# Patient Record
Sex: FEMALE | Race: WHITE | NOT HISPANIC OR LATINO | Employment: FULL TIME
[De-identification: names, ages, dates, MRNs, and addresses within clinical notes are randomized per-mention and may not be internally consistent; named-entity substitution may affect disease eponyms.]

---

## 2017-06-17 ENCOUNTER — HEALTH MAINTENANCE LETTER (OUTPATIENT)
Age: 52
End: 2017-06-17

## 2017-09-13 ENCOUNTER — OFFICE VISIT (OUTPATIENT)
Dept: MIDWIFE SERVICES | Facility: CLINIC | Age: 52
End: 2017-09-13
Payer: COMMERCIAL

## 2017-09-13 VITALS
BODY MASS INDEX: 26.37 KG/M2 | SYSTOLIC BLOOD PRESSURE: 110 MMHG | DIASTOLIC BLOOD PRESSURE: 70 MMHG | HEIGHT: 65 IN | WEIGHT: 158.3 LBS | TEMPERATURE: 99.2 F | HEART RATE: 83 BPM

## 2017-09-13 DIAGNOSIS — E28.319 EARLY MENOPAUSE: ICD-10-CM

## 2017-09-13 DIAGNOSIS — R10.2 PELVIC PAIN IN FEMALE: Primary | ICD-10-CM

## 2017-09-13 PROCEDURE — 99213 OFFICE O/P EST LOW 20 MIN: CPT | Performed by: ADVANCED PRACTICE MIDWIFE

## 2017-09-13 NOTE — NURSING NOTE
"Chief Complaint   Patient presents with     Consult       Initial /70  Pulse 83  Temp 99.2  F (37.3  C) (Oral)  Ht 5' 5\" (1.651 m)  Wt 158 lb 4.8 oz (71.8 kg)  LMP 2012  BMI 26.34 kg/m2 Estimated body mass index is 26.34 kg/(m^2) as calculated from the following:    Height as of this encounter: 5' 5\" (1.651 m).    Weight as of this encounter: 158 lb 4.8 oz (71.8 kg).  BP completed using cuff size: regular        The following HM Due: NONE      The following patient reported/Care Every where data was sent to:  P ABSTRACT QUALITY INITIATIVES [[id number]]  Pap smear done on this date: 2017 (approximately), by this group: [facility] in [place] , results were negative .      N/a      Devorah Acevedo MA               "

## 2017-09-13 NOTE — MR AVS SNAPSHOT
After Visit Summary   9/13/2017    Peace Boudreaux    MRN: [id number]           Patient Information     Date Of Birth          1965        Visit Information        Provider Department      9/13/2017 11:30 AM Ciarra Paz CNM [facility]        Today's Diagnoses     Pelvic pain in female    -  1       Follow-ups after your visit        Follow-up notes from your care team     Return for Pelvic US and Gyn appt following .      Future tests that were ordered for you today     Open Future Orders        Priority Expected Expires Ordered    US Pelvic Limited Routine  9/13/2018 9/13/2017            Who to contact     If you have questions or need follow up information about today's clinic visit or your schedule please contact [facility] directly at [phone].  Normal or non-critical lab and imaging results will be communicated to you by [employer], letter or phone within 4 business days after the clinic has received the results. If you do not hear from us within 7 days, please contact the clinic through [employer] or phone. If you have a critical or abnormal lab result, we will notify you by phone as soon as possible.  Submit refill requests through [employer] or call your pharmacy and they will forward the refill request to us. Please allow 3 business days for your refill to be completed.          Additional Information About Your Visit        MyChart Information     [employer] gives you secure access to your electronic health record. If you see a primary care provider, you can also send messages to your care team and make appointments. If you have questions, please call your primary care clinic.  If you do not have a primary care provider, please call [phone] and they will assist you.        Care EveryWhere ID     This is your Care EveryWhere ID. This could be used by other organizations to access your [place] medical records  [id number]        Your Vitals Were  "    Pulse Temperature Height Last Period BMI (Body Mass Index)       83 99.2  F (37.3  C) (Oral) 5' 5\" (1.651 m) 05/08/2012 26.34 kg/m2        Blood Pressure from Last 3 Encounters:   09/13/17 110/70   09/03/15 119/75   07/20/15 111/76    Weight from Last 3 Encounters:   09/13/17 158 lb 4.8 oz (71.8 kg)   09/03/15 152 lb (68.9 kg)   07/20/15 152 lb (68.9 kg)               Primary Care Provider    Clinic Unassigned Viet Iraheta MD       No address on file        Equal Access to Services     [facility]: Hadii jose luis Sharpe, wamarcin howell, qaybrosa kaalmaana pang, dylan conway . So [facility] [phone].    ATENCIÓN: Si habla español, tiene a atkinson disposición servicios gratuitos de asistencia lingüística. Llame al [phone].    We comply with applicable federal civil rights laws and [place] laws. We do not discriminate on the basis of race, color, national origin, age, disability sex, sexual orientation or gender identity.            Thank you!     Thank you for choosing [facility]  for your care. Our goal is always to provide you with excellent care. Hearing back from our patients is one way we can continue to improve our services. Please take a few minutes to complete the written survey that you may receive in the mail after your visit with us. Thank you!             Your Updated Medication List - Protect others around you: Learn how to safely use, store and throw away your medicines at www.disposemymeds.org.          This list is accurate as of: 9/13/17 12:08 PM.  Always use your most recent med list.                   Brand Name Dispense Instructions for use Diagnosis    calcium carbonate 1250 MG tablet    OS-BARRIE 500 mg [place]. [place]     Take 500 mg by mouth 2 times daily        WOMENS MULTIVITAMIN PLUS PO      Take  by mouth.          "

## 2017-09-13 NOTE — PROGRESS NOTES
"S:  Here for \"severe abdominal pain\". Reports that it is 5/10 and sometimes has waves of pain 8/10 that makes her double over.  Has also been feeling \"full\", \"bloated\" Stopped taking estrogen replacement about 1 month ago.  About 2 weeks ago started having diarrhea.  Also has had gas.  Feels constipated, but has had regular BMs and diarrhea.    O:  Abdominal exam done.  Reported tenderness when palpating lower left quadrant.    A:  Abdominal pain - suspected intestinal pain  R/O ovarian pain   P:  Advised her to go to ED if pain continues or worsens  Pelvic US and OB/Gyn consult ordered - walked her to [facility]'s office to schedule.    Advised her to see Primary Care to discuss GI concerns  Peace verbalized understanding and agreement with plan     20 minutes was spent face to face with the patient today discussing her history, diagnosis, and follow-up plan as noted above. Over 50% of the visit was spent in counseling and coordination of care.    Total Visit Time: 20 minutes.         "

## 2017-09-14 ENCOUNTER — RADIANT APPOINTMENT (OUTPATIENT)
Dept: ULTRASOUND IMAGING | Facility: CLINIC | Age: 52
End: 2017-09-14
Attending: ADVANCED PRACTICE MIDWIFE
Payer: COMMERCIAL

## 2017-09-14 DIAGNOSIS — R10.2 PELVIC PAIN IN FEMALE: ICD-10-CM

## 2017-09-14 PROCEDURE — 76830 TRANSVAGINAL US NON-OB: CPT | Performed by: OBSTETRICS & GYNECOLOGY

## 2017-09-15 ENCOUNTER — HOSPITAL ENCOUNTER (OUTPATIENT)
Dept: CT IMAGING | Facility: CLINIC | Age: 52
Discharge: HOME | End: 2017-09-15
Attending: FAMILY MEDICINE | Admitting: FAMILY MEDICINE
Payer: COMMERCIAL

## 2017-09-15 DIAGNOSIS — R10.9 UNSPECIFIED ABDOMINAL PAIN: ICD-10-CM

## 2017-09-15 PROCEDURE — 25000125 ZZHC RX 250: Performed by: RADIOLOGY

## 2017-09-15 PROCEDURE — 25000128 H RX IP 250 OP 636: Performed by: RADIOLOGY

## 2017-09-15 PROCEDURE — 74177 CT ABD & PELVIS W/CONTRAST: CPT

## 2017-09-15 RX ORDER — IOPAMIDOL 755 MG/ML
100 INJECTION, SOLUTION INTRAVASCULAR ONCE
Status: COMPLETED | OUTPATIENT
Start: 2017-09-15 | End: 2017-09-15

## 2017-09-15 RX ADMIN — SODIUM CHLORIDE 59 ML: 9 INJECTION, SOLUTION INTRAVENOUS at 13:34

## 2017-09-15 RX ADMIN — IOPAMIDOL 78 ML: 755 INJECTION, SOLUTION INTRAVENOUS at 13:34

## 2017-09-21 ENCOUNTER — RADIANT APPOINTMENT (OUTPATIENT)
Dept: MAMMOGRAPHY | Facility: CLINIC | Age: 52
End: 2017-09-21
Payer: COMMERCIAL

## 2017-09-21 DIAGNOSIS — Z12.31 VISIT FOR SCREENING MAMMOGRAM: ICD-10-CM

## 2017-09-21 PROCEDURE — G0202 SCR MAMMO BI INCL CAD: HCPCS

## 2018-03-12 ENCOUNTER — TELEPHONE (OUTPATIENT)
Dept: MIDWIFE SERVICES | Facility: CLINIC | Age: 53
End: 2018-03-12

## 2018-03-12 NOTE — TELEPHONE ENCOUNTER
Pt is past due for f/u pap smear.  Reminder letter was sent 05/19/17.  Spoke with pt, states she is being followed elsewhere for her Gyn care.  Shahana Shin,    Pap Tracking

## 2018-06-06 ENCOUNTER — TRANSFERRED RECORDS (OUTPATIENT)
Dept: HEALTH INFORMATION MANAGEMENT | Facility: CLINIC | Age: 53
End: 2018-06-06

## 2018-06-08 ENCOUNTER — TRANSFERRED RECORDS (OUTPATIENT)
Dept: HEALTH INFORMATION MANAGEMENT | Facility: CLINIC | Age: 53
End: 2018-06-08

## 2018-06-19 ENCOUNTER — TRANSFERRED RECORDS (OUTPATIENT)
Dept: HEALTH INFORMATION MANAGEMENT | Facility: CLINIC | Age: 53
End: 2018-06-19

## 2018-06-23 ENCOUNTER — HEALTH MAINTENANCE LETTER (OUTPATIENT)
Age: 53
End: 2018-06-23

## 2018-07-05 ENCOUNTER — HOSPITAL ENCOUNTER (OUTPATIENT)
Facility: CLINIC | Age: 53
Discharge: HOME | End: 2018-07-05
Attending: OBSTETRICS & GYNECOLOGY | Admitting: OBSTETRICS & GYNECOLOGY
Payer: COMMERCIAL

## 2018-07-05 ENCOUNTER — ANESTHESIA EVENT (OUTPATIENT)
Dept: SURGERY | Facility: CLINIC | Age: 53
End: 2018-07-05
Payer: COMMERCIAL

## 2018-07-05 ENCOUNTER — ANESTHESIA (OUTPATIENT)
Dept: SURGERY | Facility: CLINIC | Age: 53
End: 2018-07-05
Payer: COMMERCIAL

## 2018-07-05 VITALS
TEMPERATURE: 95.9 F | WEIGHT: 170 LBS | BODY MASS INDEX: 27.32 KG/M2 | SYSTOLIC BLOOD PRESSURE: 134 MMHG | HEIGHT: 66 IN | RESPIRATION RATE: 16 BRPM | OXYGEN SATURATION: 96 % | DIASTOLIC BLOOD PRESSURE: 89 MMHG

## 2018-07-05 PROCEDURE — 25000566 ZZH SEVOFLURANE, EA 15 MIN: Performed by: OBSTETRICS & GYNECOLOGY

## 2018-07-05 PROCEDURE — 37000008 ZZH ANESTHESIA TECHNICAL FEE, 1ST 30 MIN: Performed by: OBSTETRICS & GYNECOLOGY

## 2018-07-05 PROCEDURE — 37000009 ZZH ANESTHESIA TECHNICAL FEE, EACH ADDTL 15 MIN: Performed by: OBSTETRICS & GYNECOLOGY

## 2018-07-05 PROCEDURE — 25800025 ZZH RX 258: Performed by: OBSTETRICS & GYNECOLOGY

## 2018-07-05 PROCEDURE — 71000012 ZZH RECOVERY PHASE 1 LEVEL 1 FIRST HR: Performed by: OBSTETRICS & GYNECOLOGY

## 2018-07-05 PROCEDURE — 71000027 ZZH RECOVERY PHASE 2 EACH 15 MINS: Performed by: OBSTETRICS & GYNECOLOGY

## 2018-07-05 PROCEDURE — 25000128 H RX IP 250 OP 636: Performed by: NURSE ANESTHETIST, CERTIFIED REGISTERED

## 2018-07-05 PROCEDURE — 27210995 ZZH RX 272: Performed by: OBSTETRICS & GYNECOLOGY

## 2018-07-05 PROCEDURE — 40000170 ZZH STATISTIC PRE-PROCEDURE ASSESSMENT II: Performed by: OBSTETRICS & GYNECOLOGY

## 2018-07-05 PROCEDURE — 36000058 ZZH SURGERY LEVEL 3 EA 15 ADDTL MIN: Performed by: OBSTETRICS & GYNECOLOGY

## 2018-07-05 PROCEDURE — 25000132 ZZH RX MED GY IP 250 OP 250 PS 637: Performed by: OBSTETRICS & GYNECOLOGY

## 2018-07-05 PROCEDURE — 88305 TISSUE EXAM BY PATHOLOGIST: CPT | Mod: 26 | Performed by: OBSTETRICS & GYNECOLOGY

## 2018-07-05 PROCEDURE — 25000125 ZZHC RX 250: Performed by: NURSE ANESTHETIST, CERTIFIED REGISTERED

## 2018-07-05 PROCEDURE — 88305 TISSUE EXAM BY PATHOLOGIST: CPT | Performed by: OBSTETRICS & GYNECOLOGY

## 2018-07-05 PROCEDURE — 36000056 ZZH SURGERY LEVEL 3 1ST 30 MIN: Performed by: OBSTETRICS & GYNECOLOGY

## 2018-07-05 PROCEDURE — 27210794 ZZH OR GENERAL SUPPLY STERILE: Performed by: OBSTETRICS & GYNECOLOGY

## 2018-07-05 RX ORDER — ONDANSETRON 2 MG/ML
4 INJECTION INTRAMUSCULAR; INTRAVENOUS EVERY 30 MIN PRN
Status: DISCONTINUED | OUTPATIENT
Start: 2018-07-05 | End: 2018-07-05 | Stop reason: HOSPADM

## 2018-07-05 RX ORDER — DEXAMETHASONE SODIUM PHOSPHATE 4 MG/ML
INJECTION, SOLUTION INTRA-ARTICULAR; INTRALESIONAL; INTRAMUSCULAR; INTRAVENOUS; SOFT TISSUE PRN
Status: DISCONTINUED | OUTPATIENT
Start: 2018-07-05 | End: 2018-07-05

## 2018-07-05 RX ORDER — GLYCOPYRROLATE 0.2 MG/ML
INJECTION, SOLUTION INTRAMUSCULAR; INTRAVENOUS PRN
Status: DISCONTINUED | OUTPATIENT
Start: 2018-07-05 | End: 2018-07-05

## 2018-07-05 RX ORDER — SODIUM CHLORIDE, SODIUM LACTATE, POTASSIUM CHLORIDE, CALCIUM CHLORIDE 600; 310; 30; 20 MG/100ML; MG/100ML; MG/100ML; MG/100ML
INJECTION, SOLUTION INTRAVENOUS CONTINUOUS
Status: DISCONTINUED | OUTPATIENT
Start: 2018-07-05 | End: 2018-07-05 | Stop reason: HOSPADM

## 2018-07-05 RX ORDER — LIDOCAINE HYDROCHLORIDE 20 MG/ML
INJECTION, SOLUTION INFILTRATION; PERINEURAL PRN
Status: DISCONTINUED | OUTPATIENT
Start: 2018-07-05 | End: 2018-07-05

## 2018-07-05 RX ORDER — FENTANYL CITRATE 50 UG/ML
25-50 INJECTION, SOLUTION INTRAMUSCULAR; INTRAVENOUS
Status: DISCONTINUED | OUTPATIENT
Start: 2018-07-05 | End: 2018-07-05 | Stop reason: HOSPADM

## 2018-07-05 RX ORDER — ONDANSETRON 4 MG/1
4 TABLET, ORALLY DISINTEGRATING ORAL EVERY 30 MIN PRN
Status: DISCONTINUED | OUTPATIENT
Start: 2018-07-05 | End: 2018-07-05 | Stop reason: HOSPADM

## 2018-07-05 RX ORDER — NEOSTIGMINE METHYLSULFATE 1 MG/ML
VIAL (ML) INJECTION PRN
Status: DISCONTINUED | OUTPATIENT
Start: 2018-07-05 | End: 2018-07-05

## 2018-07-05 RX ORDER — PROPOFOL 10 MG/ML
INJECTION, EMULSION INTRAVENOUS CONTINUOUS PRN
Status: DISCONTINUED | OUTPATIENT
Start: 2018-07-05 | End: 2018-07-05

## 2018-07-05 RX ORDER — ONDANSETRON 2 MG/ML
INJECTION INTRAMUSCULAR; INTRAVENOUS PRN
Status: DISCONTINUED | OUTPATIENT
Start: 2018-07-05 | End: 2018-07-05

## 2018-07-05 RX ORDER — SODIUM CHLORIDE, SODIUM LACTATE, POTASSIUM CHLORIDE, CALCIUM CHLORIDE 600; 310; 30; 20 MG/100ML; MG/100ML; MG/100ML; MG/100ML
INJECTION, SOLUTION INTRAVENOUS CONTINUOUS PRN
Status: DISCONTINUED | OUTPATIENT
Start: 2018-07-05 | End: 2018-07-05

## 2018-07-05 RX ORDER — FENTANYL CITRATE 50 UG/ML
INJECTION, SOLUTION INTRAMUSCULAR; INTRAVENOUS PRN
Status: DISCONTINUED | OUTPATIENT
Start: 2018-07-05 | End: 2018-07-05

## 2018-07-05 RX ORDER — HYDROMORPHONE HYDROCHLORIDE 1 MG/ML
.3-.5 INJECTION, SOLUTION INTRAMUSCULAR; INTRAVENOUS; SUBCUTANEOUS EVERY 10 MIN PRN
Status: DISCONTINUED | OUTPATIENT
Start: 2018-07-05 | End: 2018-07-05 | Stop reason: HOSPADM

## 2018-07-05 RX ORDER — ALBUTEROL SULFATE 0.83 MG/ML
2.5 SOLUTION RESPIRATORY (INHALATION) EVERY 4 HOURS PRN
Status: DISCONTINUED | OUTPATIENT
Start: 2018-07-05 | End: 2018-07-05 | Stop reason: HOSPADM

## 2018-07-05 RX ORDER — MAGNESIUM HYDROXIDE 1200 MG/15ML
LIQUID ORAL PRN
Status: DISCONTINUED | OUTPATIENT
Start: 2018-07-05 | End: 2018-07-05 | Stop reason: HOSPADM

## 2018-07-05 RX ORDER — PROPOFOL 10 MG/ML
INJECTION, EMULSION INTRAVENOUS PRN
Status: DISCONTINUED | OUTPATIENT
Start: 2018-07-05 | End: 2018-07-05

## 2018-07-05 RX ORDER — KETOROLAC TROMETHAMINE 30 MG/ML
INJECTION, SOLUTION INTRAMUSCULAR; INTRAVENOUS PRN
Status: DISCONTINUED | OUTPATIENT
Start: 2018-07-05 | End: 2018-07-05

## 2018-07-05 RX ORDER — OXYCODONE HYDROCHLORIDE 5 MG/1
5 TABLET ORAL
Status: COMPLETED | OUTPATIENT
Start: 2018-07-05 | End: 2018-07-05

## 2018-07-05 RX ORDER — HYDRALAZINE HYDROCHLORIDE 20 MG/ML
2.5-5 INJECTION INTRAMUSCULAR; INTRAVENOUS EVERY 10 MIN PRN
Status: DISCONTINUED | OUTPATIENT
Start: 2018-07-05 | End: 2018-07-05 | Stop reason: HOSPADM

## 2018-07-05 RX ORDER — MEPERIDINE HYDROCHLORIDE 25 MG/ML
12.5 INJECTION INTRAMUSCULAR; INTRAVENOUS; SUBCUTANEOUS
Status: DISCONTINUED | OUTPATIENT
Start: 2018-07-05 | End: 2018-07-05 | Stop reason: HOSPADM

## 2018-07-05 RX ORDER — NALOXONE HYDROCHLORIDE 0.4 MG/ML
.1-.4 INJECTION, SOLUTION INTRAMUSCULAR; INTRAVENOUS; SUBCUTANEOUS
Status: DISCONTINUED | OUTPATIENT
Start: 2018-07-05 | End: 2018-07-05 | Stop reason: HOSPADM

## 2018-07-05 RX ADMIN — PROPOFOL 50 MG: 10 INJECTION, EMULSION INTRAVENOUS at 10:30

## 2018-07-05 RX ADMIN — DEXAMETHASONE SODIUM PHOSPHATE 4 MG: 4 INJECTION, SOLUTION INTRA-ARTICULAR; INTRALESIONAL; INTRAMUSCULAR; INTRAVENOUS; SOFT TISSUE at 10:35

## 2018-07-05 RX ADMIN — LIDOCAINE HYDROCHLORIDE 100 MG: 20 INJECTION, SOLUTION INFILTRATION; PERINEURAL at 10:28

## 2018-07-05 RX ADMIN — SODIUM CHLORIDE, POTASSIUM CHLORIDE, SODIUM LACTATE AND CALCIUM CHLORIDE: 600; 310; 30; 20 INJECTION, SOLUTION INTRAVENOUS at 10:24

## 2018-07-05 RX ADMIN — GLYCOPYRROLATE 0.2 MG: 0.2 INJECTION, SOLUTION INTRAMUSCULAR; INTRAVENOUS at 10:53

## 2018-07-05 RX ADMIN — ONDANSETRON 4 MG: 2 INJECTION INTRAMUSCULAR; INTRAVENOUS at 10:50

## 2018-07-05 RX ADMIN — PROPOFOL 150 MG: 10 INJECTION, EMULSION INTRAVENOUS at 10:28

## 2018-07-05 RX ADMIN — NEOSTIGMINE METHYLSULFATE 2 MG: 1 INJECTION, SOLUTION INTRAVENOUS at 10:53

## 2018-07-05 RX ADMIN — KETOROLAC TROMETHAMINE 30 MG: 30 INJECTION, SOLUTION INTRAMUSCULAR at 10:50

## 2018-07-05 RX ADMIN — FENTANYL CITRATE 50 MCG: 50 INJECTION, SOLUTION INTRAMUSCULAR; INTRAVENOUS at 10:28

## 2018-07-05 RX ADMIN — ROCURONIUM BROMIDE 10 MG: 10 INJECTION INTRAVENOUS at 10:39

## 2018-07-05 RX ADMIN — PROPOFOL 100 MCG/KG/MIN: 10 INJECTION, EMULSION INTRAVENOUS at 10:31

## 2018-07-05 RX ADMIN — MIDAZOLAM 2 MG: 1 INJECTION INTRAMUSCULAR; INTRAVENOUS at 10:24

## 2018-07-05 RX ADMIN — OXYCODONE HYDROCHLORIDE 5 MG: 5 TABLET ORAL at 11:51

## 2018-07-05 ASSESSMENT — ENCOUNTER SYMPTOMS
DYSRHYTHMIAS: 0
SEIZURES: 0

## 2018-07-05 ASSESSMENT — COPD QUESTIONNAIRES: COPD: 0

## 2018-07-05 ASSESSMENT — LIFESTYLE VARIABLES: TOBACCO_USE: 0

## 2018-07-05 NOTE — ANESTHESIA POSTPROCEDURE EVALUATION
Patient: Peace Boudreaux    Procedure(s):  OPERATIVE HYSTEROSCOPY AND POLYPECTOMY WITH SUERO NEPHEW MORCELLATOR AND FLUID MANAGEMENT  - Wound Class: II-Clean Contaminated    Diagnosis:post menopausal bleeding   Diagnosis Additional Information: No value filed.    Anesthesia Type:  LMA, General    Note:  Anesthesia Post Evaluation    Patient location during evaluation: PACU  Patient participation: Able to fully participate in evaluation  Level of consciousness: awake and alert  Pain management: adequate  Airway patency: patent  Cardiovascular status: acceptable, blood pressure returned to baseline and hemodynamically stable  Respiratory status: acceptable and nasal cannula  Hydration status: acceptable  PONV: none     Anesthetic complications: None          Last vitals:  Vitals:    07/05/18 1105 07/05/18 1115 07/05/18 1130   BP:  121/78 123/80   Resp: 16 17 14   Temp:  35.4  C (95.7  F) 35.4  C (95.7  F)   SpO2:  99% 96%         Electronically Signed By: Nicole Soto MD  July 5, 2018  11:44 AM

## 2018-07-05 NOTE — ANESTHESIA CARE TRANSFER NOTE
Patient: Peace Boudreaux    Procedure(s):  OPERATIVE HYSTEROSCOPY AND POLYPECTOMY WITH SUERO NEPHEW MORCELLATOR AND FLUID MANAGEMENT  - Wound Class: II-Clean Contaminated    Diagnosis: post menopausal bleeding   Diagnosis Additional Information: No value filed.    Anesthesia Type:   LMA, General     Note:  Airway :Face Mask  Patient transferred to:PACU  Comments: VSS on arrival to PACU. Alert and talking, on 6L SFM 02. Report given to RN before transfer of patient care.Handoff Report: Identifed the Patient, Identified the Reponsible Provider, Reviewed the pertinent medical history, Discussed the surgical course, Reviewed Intra-OP anesthesia mangement and issues during anesthesia, Set expectations for post-procedure period and Allowed opportunity for questions and acknowledgement of understanding      Vitals: (Last set prior to Anesthesia Care Transfer)    CRNA VITALS  7/5/2018 1032 - 7/5/2018 1109      7/5/2018             Pulse: 67    SpO2: 100 %    Resp Rate (observed): (!)  5                Electronically Signed By: MINNIE Byrnes CRNA  July 5, 2018  11:09 AM

## 2018-07-05 NOTE — ANESTHESIA PREPROCEDURE EVALUATION
Procedure: Procedure(s):  COMBINED DILATION AND CURETTAGE, OPERATIVE HYSTEROSCOPY WITH MORCELLATOR  Preop diagnosis: post menopausal bleeding     Allergies   Allergen Reactions     No Known Drug Allergies      Past Medical History:   Diagnosis Date     ASCUS favor benign 8/2015    colp - neg     Congenital anomalies of other endocrine glands 2004    Cyst on thyroid     LSIL (low grade squamous intraepithelial lesion) on Pap smear 6/2014    neg high risk  HPV     Past Surgical History:   Procedure Laterality Date     HC EXCISE HAND/FOOT NEUROMA  2002     THYROID BIOPSY  2004     Prior to Admission medications    Medication Sig Start Date End Date Taking? Authorizing Provider   calcium carbonate (OS-BARRIE 500 MG [place]. [place]) 500 MG tablet Take 500 mg by mouth 2 times daily    Reported, Patient   Multiple Vitamins-Minerals (WOMENS MULTIVITAMIN PLUS PO) Take  by mouth.    Reported, Patient     No current Epic-ordered facility-administered medications on file.      Current Outpatient Prescriptions Ordered in Epic   Medication     calcium carbonate (OS-BARRIE 500 MG [place]. [place]) 500 MG tablet     Multiple Vitamins-Minerals (WOMENS MULTIVITAMIN PLUS PO)     Wt Readings from Last 1 Encounters:   09/13/17 71.8 kg (158 lb 4.8 oz)     Temp Readings from Last 1 Encounters:   09/13/17 37.3  C (99.2  F) (Oral)     BP Readings from Last 6 Encounters:   09/13/17 110/70   09/03/15 119/75   07/20/15 111/76   06/10/14 104/73   09/17/12 104/73   05/10/12 110/62     Pulse Readings from Last 4 Encounters:   09/13/17 83   09/03/15 84   07/20/15 85   06/10/14 65     Resp Readings from Last 1 Encounters:   No data found for Resp     SpO2 Readings from Last 1 Encounters:   07/20/15 100%       Anesthesia Evaluation     .             ROS/MED HX    ENT/Pulmonary:      (-) tobacco use, asthma, COPD and sleep apnea   Neurologic:      (-) seizures, CVA and migraines   Cardiovascular:        (-) hypertension, CAD, WALKER, arrhythmias, valvular problems/murmurs  and dyslipidemia   METS/Exercise Tolerance:  >4 METS   Hematologic:        (-) history of blood clots, anemia and other hematologic disorder   Musculoskeletal:        (-) arthritis   GI/Hepatic:        (-) GERD and liver disease   Renal/Genitourinary:     (+) Other Renal/ Genitourinary (uterine polyps, post-menopausal bleeding),    (-) renal disease and nephrolithiasis   Endo:      (-) Type I DM, Type II DM, thyroid disease and obesity   Psychiatric:  - neg psychiatric ROS       Infectious Disease:        (-) Recent Fever   Malignancy:         Other:                     Physical Exam  Normal systems: cardiovascular, pulmonary and dental    Airway   Mallampati: III  TM distance: >3 FB  Neck ROM: full    Dental     Cardiovascular   Rhythm and rate: regular and normal  (-) no murmur    Pulmonary    breath sounds clear to auscultation                    Anesthesia Plan      History & Physical Review      ASA Status:  1 .    NPO Status:  > 8 hours    Plan for LMA and General with Propofol induction.   PONV prophylaxis:  Ondansetron (or other 5HT-3) and Dexamethasone or Solumedrol  Propofol infusion      Postoperative Care  Postoperative pain management:  Multi-modal analgesia.      Consents  Anesthetic plan, risks, benefits and alternatives discussed with:  Patient..                          .

## 2018-07-05 NOTE — IP AVS SNAPSHOT
[facility] Same Day [facility]    [address]    [place] [address]    Phone:  [phone]    Fax:  [phone]                                       After Visit Summary   7/5/2018    Peace Boudreaux    MRN: [id number]           After Visit Summary Signature Page     I have received my discharge instructions, and my questions have been answered. I have discussed any challenges I see with this plan with the nurse or doctor.    ..........................................................................................................................................  Patient/Patient Representative Signature      ..........................................................................................................................................  Patient Representative Print Name and Relationship to Patient    ..................................................               ................................................  Date                                            Time    ..........................................................................................................................................  Reviewed by Signature/Title    ...................................................              ..............................................  Date                                                            Time

## 2018-07-05 NOTE — OP NOTE
Procedure Date: 2018      PREOPERATIVE DIAGNOSIS:  Postmenopausal bleeding, possible endometrial polyp.      POSTOPERATIVE DIAGNOSIS:  Postmenopausal bleeding, possible endometrial polyp.      PROCEDURE PERFORMED:  Hysteroscopy and excision of uterine masses with Smith & Nephew morcellator.      PROCEDURE IN DETAIL:  The patient was prepped and draped in the lithotomy position under general anesthetic.  Examination at this time revealed the uterus to be normal size and anteverted.  A single-tooth tenaculum was applied to the anterior lip of the cervix in a transverse manner and a cervical cannula inserted into the cervix.  A single-tooth tenaculum was applied to the anterior lip of the cervix in transverse manner.  The cervix dilated to a #5 Lanier dilator size.  The hysteroscope was inserted.  Excellent visualization was noted despite the fact that the patient was having a flow at the time of the procedure.  There were small possible polyps in the right cornual region as well as a long filmy mass adherent to the mid fundus and low in the uterine cavity posteriorly another filmy mass.  Both of these were approximately 1-1/4 or so cm in length.  All of these areas were easily removed with the morcellator.  Following the procedure, the uterine cavity was symmetrical and no irregularities were noted.  The procedure was then terminated.  The fluid deficit was 200 mL.  Estimated blood loss was 10 mL.  The patient left the operating room in good condition.         EMMANUELLE PARRISH MD             D: 2018   T: 2018   MT: SAMUEL      Name:     BART CABRERA   MRN:      [id number]        Account:        [id number]   :      1965           Procedure Date: 2018      Document: [id number]

## 2018-07-05 NOTE — DISCHARGE INSTRUCTIONS
Same Day Surgery Discharge Instructions for  Sedation and General Anesthesia       It's not unusual to feel dizzy, light-headed or faint for up to 24 hours after surgery or while taking pain medication.  If you have these symptoms: sit for a few minutes before standing and have someone assist you when you get up to walk or use the bathroom.      You should rest and relax for the next 24 hours. We recommend you make arrangements to have an adult stay with you for at least 24 hours after your discharge.  Avoid hazardous and strenuous activity.      DO NOT DRIVE any vehicle or operate mechanical equipment for 24 hours following the end of your surgery.  Even though you may feel normal, your reactions may be affected by the medication you have received.      Do not drink alcoholic beverages for 24 hours following surgery.       Slowly progress to your regular diet as you feel able. It's not unusual to feel nauseated and/or vomit after receiving anesthesia.  If you develop these symptoms, drink clear liquids (apple juice, ginger ale, broth, 7-up, etc. ) until you feel better.  If your nausea and vomiting persists for 24 hours, please notify your surgeon.        All narcotic pain medications, along with inactivity and anesthesia, can cause constipation. Drinking plenty of liquids and increasing fiber intake will help.      For any questions of a medical nature, call your surgeon.      Do not make important decisions for 24 hours.      If you had general anesthesia, you may have a sore throat for a couple of days related to the breathing tube used during surgery.  You may use Cepacol lozenges to help with this discomfort.  If it worsens or if you develop a fever, contact your surgeon.       If you feel your pain is not well managed with the pain medications prescribed by your surgeon, please contact your surgeon's office to let them know so they can address your concerns.       [facility]  D&C OR  Laparoscopy  Discharge Instructions    ACTIVITY:  You may restart normal activities as your abdominal discomfort disappears.  You may expect some discomfort under the ribs and shoulder area for the first 24 hours.  In nearly all cases, this will disappear shortly after the first day.  It is certainly permissible to climb stairs, shower, and do ordinary, quiet activities.  More vigorous activities such as sports, intercourse and work may be resumed in 48-72 hours as seems to befit your situation.    OFFICE VISIT:  Please call a day or two after your surgery to make an appointment in approximately 2 weeks to discuss the results of your surgery and have your check-up.    VAGINAL DISCHARGE:  You may have some bloody vaginal discharge for as long as one week.  Ordinary tampons may be used after 3-4 days.     INCISIONAL CARE: Keep incisions clean and dry for 24 hours.  You may shower tomorrow.  No bathing or swimming for 3-5 days. If you have steri-strips, they should remain in place 3-5 days, after which they can be removed.      TEMPERATURE:  If you develop a temperature elevation of 101  or higher, please call our office immediately.    RESTRICTIONS:  Due to the effects of general anesthesia, please do not drive a car, drink alcoholic beverages, nor operate complex machinery in the first 24 hours following surgery.    PLEASE FEEL FREE TO CALL OUR OFFICE IF ANY QUESTIONS OR PROBLEMS ARISE.    [facility]    Elbert Rowley M.D.  Solo Wilkes M.D.   Gerard Cole M.D.  SHERRI Don M.D.   Sakshi Vasquez M.D.  Talia Morrow M.D.  BRYSON Malave M.D. SHERRI Segundo M.D.  _______________________________________________________________________________                   [address]  [address] [facility] [address]  [address]             [facility] [address]  [place] [address]            [phone] (Telephone)                                               [phone] (Telephone)            [phone] (Fax)  [phone] (Fax)            [facility]    **If you have questions or concerns about your procedure, Call Dr. Cole at [phone]**        Today you received Toradol, an antiinflammatory medication similar to Ibuprofen.  You should not take other antiinflammatory medication, such as Ibuprofen, Motrin, Advil, Aleve, Naprosyn, etc, until 5:00pm.     Oxycodone IR 5mg tablet given at 11:55am

## 2018-07-06 LAB — COPATH REPORT: NORMAL

## 2018-10-23 ENCOUNTER — RADIANT APPOINTMENT (OUTPATIENT)
Dept: MAMMOGRAPHY | Facility: CLINIC | Age: 53
End: 2018-10-23
Attending: OBSTETRICS & GYNECOLOGY
Payer: COMMERCIAL

## 2018-10-23 DIAGNOSIS — Z12.31 VISIT FOR SCREENING MAMMOGRAM: ICD-10-CM

## 2018-10-23 PROCEDURE — 77067 SCR MAMMO BI INCL CAD: CPT

## 2019-10-01 ENCOUNTER — HEALTH MAINTENANCE LETTER (OUTPATIENT)
Age: 54
End: 2019-10-01

## 2019-12-30 ENCOUNTER — ANCILLARY PROCEDURE (OUTPATIENT)
Dept: MAMMOGRAPHY | Facility: CLINIC | Age: 54
End: 2019-12-30
Attending: FAMILY MEDICINE
Payer: COMMERCIAL

## 2019-12-30 DIAGNOSIS — Z12.31 VISIT FOR SCREENING MAMMOGRAM: ICD-10-CM

## 2019-12-30 PROCEDURE — 77067 SCR MAMMO BI INCL CAD: CPT

## 2020-06-10 ENCOUNTER — TRANSFERRED RECORDS (OUTPATIENT)
Dept: MULTI SPECIALTY CLINIC | Facility: CLINIC | Age: 55
End: 2020-06-10

## 2021-01-15 ENCOUNTER — HEALTH MAINTENANCE LETTER (OUTPATIENT)
Age: 56
End: 2021-01-15

## 2021-01-20 ENCOUNTER — IMMUNIZATION (OUTPATIENT)
Dept: PEDIATRICS | Facility: CLINIC | Age: 56
End: 2021-01-20
Payer: COMMERCIAL

## 2021-01-20 PROCEDURE — 91300 PR COVID VAC PFIZER DIL RECON 30 MCG/0.3 ML IM: CPT

## 2021-01-20 PROCEDURE — 0001A PR COVID VAC PFIZER DIL RECON 30 MCG/0.3 ML IM: CPT

## 2021-02-10 ENCOUNTER — IMMUNIZATION (OUTPATIENT)
Dept: PEDIATRICS | Facility: CLINIC | Age: 56
End: 2021-02-10
Attending: INTERNAL MEDICINE
Payer: COMMERCIAL

## 2021-02-10 PROCEDURE — 91300 PR COVID VAC PFIZER DIL RECON 30 MCG/0.3 ML IM: CPT

## 2021-02-10 PROCEDURE — 0002A PR COVID VAC PFIZER DIL RECON 30 MCG/0.3 ML IM: CPT

## 2021-03-20 ENCOUNTER — HEALTH MAINTENANCE LETTER (OUTPATIENT)
Age: 56
End: 2021-03-20

## 2021-09-04 ENCOUNTER — HEALTH MAINTENANCE LETTER (OUTPATIENT)
Age: 56
End: 2021-09-04

## 2022-02-13 ENCOUNTER — HEALTH MAINTENANCE LETTER (OUTPATIENT)
Age: 57
End: 2022-02-13

## 2022-04-10 ENCOUNTER — HEALTH MAINTENANCE LETTER (OUTPATIENT)
Age: 57
End: 2022-04-10

## 2022-08-16 ENCOUNTER — OFFICE VISIT (OUTPATIENT)
Dept: MIDWIFE SERVICES | Facility: CLINIC | Age: 57
End: 2022-08-16
Payer: COMMERCIAL

## 2022-08-16 VITALS
HEIGHT: 66 IN | DIASTOLIC BLOOD PRESSURE: 82 MMHG | BODY MASS INDEX: 28.48 KG/M2 | HEART RATE: 88 BPM | WEIGHT: 177.2 LBS | SYSTOLIC BLOOD PRESSURE: 122 MMHG

## 2022-08-16 DIAGNOSIS — Z00.00 ANNUAL PHYSICAL EXAM: Primary | ICD-10-CM

## 2022-08-16 DIAGNOSIS — Z12.31 ENCOUNTER FOR SCREENING MAMMOGRAM FOR BREAST CANCER: ICD-10-CM

## 2022-08-16 DIAGNOSIS — N95.2 VAGINAL ATROPHY: ICD-10-CM

## 2022-08-16 DIAGNOSIS — Z12.11 COLON CANCER SCREENING: ICD-10-CM

## 2022-08-16 PROCEDURE — 99386 PREV VISIT NEW AGE 40-64: CPT | Performed by: ADVANCED PRACTICE MIDWIFE

## 2022-08-16 PROCEDURE — 99213 OFFICE O/P EST LOW 20 MIN: CPT | Mod: 25 | Performed by: ADVANCED PRACTICE MIDWIFE

## 2022-08-16 PROCEDURE — G0145 SCR C/V CYTO,THINLAYER,RESCR: HCPCS | Performed by: ADVANCED PRACTICE MIDWIFE

## 2022-08-16 PROCEDURE — 87624 HPV HI-RISK TYP POOLED RSLT: CPT | Performed by: ADVANCED PRACTICE MIDWIFE

## 2022-08-16 ASSESSMENT — PATIENT HEALTH QUESTIONNAIRE - PHQ9: SUM OF ALL RESPONSES TO PHQ QUESTIONS 1-9: 1

## 2022-08-16 NOTE — PROGRESS NOTES
Peace is a 57 year old  who presents for annual exam.   Postmenopausal since 2016.  She is having hot flashes, mild. No vaginal bleeding noted.     Besides routine health maintenance, she has no other health concerns today .  GYNECOLOGIC HISTORY:  Menarche: [age]     She is not sexually active with 0 male partner(s) and she is not currently in monogamous relationship.  She wishes to have a relationship with a male partner in the future and is aware she is having some dryness of the vaginal area.  We discussed that women reaching menopause have some increased atrophy of the vagina that can make penile intercourse uncomfortable due to some abstained  periods from intercourse.    History sexually transmitted infections:No STD history  STI testing offered?  Declined  Estrogen replacement therapy: No  JUJU exposure: Unknown    History of abnormal Pap smear: NO - age 30-65 PAP every 5 years with negative HPV co-testing recommended  Family history of breast CA: Yes (Please explain): paternal aunt   Family history of uterine/ovarian CA: Unknown  Family history of colon CA: Yes (Please explain): Maternal uncle     HEALTH MAINTENANCE:  Cholesterol: (No components found for: CHOL2 ) History of abnormal lipids: unknown   Mammo: not done  . History of abnormal Mammo: unknown   Regular Self Breast Exams: Yes  Colonoscopy: yes  History of abnormal Colonoscopy: No  Dexa:  Yes  History of abnormal Dexa: No  Calcium/Vitamin D intake: source:  dairy, dietary supplement(s) Adequate? Yes  TSH: (No components found for: TSH1 )  Pap; (  Lab Results   Component Value Date    PAP ASC-US 2015    PAP LSIL 06/10/2014    PAP NIL 05/10/2011    )    HISTORY:  OB History    Para Term  AB Living   0 0 0 0 0 0   SAB IAB Ectopic Multiple Live Births   0 0 0 0 0     Past Medical History:   Diagnosis Date     ASCUS favor benign 2015    colp - neg     Congenital anomalies of other endocrine glands 2004    Cyst on thyroid      LSIL (low grade squamous intraepithelial lesion) on Pap smear 6/2014    neg high risk  HPV     Past Surgical History:   Procedure Laterality Date     OPERATIVE HYSTEROSCOPY WITH MORCELLATOR N/A 7/5/2018    Procedure: OPERATIVE HYSTEROSCOPY WITH MORCELLATOR (SUERO & NEPHEW);  OPERATIVE HYSTEROSCOPY AND POLYPECTOMY WITH SUERO NEPHEW MORCELLATOR AND FLUID MANAGEMENT ;  Surgeon: Gerard Cole MD;  Location: [facility]     THYROID BIOPSY  2004     [facility] EXCISE HAND/FOOT NEUROMA  2002     Family History   Problem Relation Age of Onset     Heart Disease Father         pacemaker     Cardiovascular Father         Pacemaker     C.A.D. Father      Heart Disease Maternal Grandmother         CHF     C.A.D. Maternal Grandmother      Heart Failure Maternal Grandmother      Gastrointestinal Disease Maternal Grandfather      Alcohol/Drug Maternal Grandfather      Social History     Socioeconomic History     Marital status: Single     Spouse name: None     Number of children: 0     Years of education: 16     Highest education level: None   Occupational History     Occupation:      Employer: [employer]   Tobacco Use     Smoking status: Never Smoker     Smokeless tobacco: Never Used   Substance and Sexual Activity     Alcohol use: Yes     Comment: occ     Drug use: No     Sexual activity: Not Currently     Partners: Male     Birth control/protection: Condom   Social History Narrative    Caffeine intake/servings daily - 0    Calcium intake/servings daily - 2-3    Exercise 4-5 times weekly cardio,wt, aerobics daily walks    Seatbelts used - Yes    Guns stored in the home - No    Self Breast Exam - Yes    Pap test up to date -  Yes    Eye exam up to date -  Yes    Dental exam up to date -  Yes    DEXA scan up to date -  Not Applicable    Flex Sig/Colonoscopy up to date -  Not Applicable    Mammography up to date - No    Immunizations reviewed and up to date - 2002 TD    Abuse: Current or Past  (Physical, Sexual or Emotional) - No    Do you feel safe in your environment - Yes    Do you cope well with stress - Yes    Do you suffer from insomnia - No    Last updated by: SOFÍA Lynne RN  5/10/11       Current Outpatient Medications:      calcium carbonate (OS-BARRIE 500 MG [place]. [place]) 500 MG tablet, Take 500 mg by mouth 2 times daily, Disp: , Rfl:      Multiple Vitamins-Minerals (WOMENS MULTIVITAMIN PLUS PO), Take  by mouth., Disp: , Rfl:      Allergies   Allergen Reactions     No Known Drug Allergies        Past medical, surgical, social and family history were reviewed and updated in EPIC.    ROS:   C:       NEGATIVE for fever, chills, change in weight  I:         NEGATIVE for worrisome rashes, moles or lesions  E:       NEGATIVE for vision changes or irritation  E/M:   NEGATIVE for ear, mouth and throat problems  R:       NEGATIVE for significant cough or SOB  CV:     NEGATIVE for chest pain, palpitations or peripheral edema  GI:      NEGATIVE for nausea, abdominal pain, heartburn, or change in bowel habits  :    NEGATIVE for frequency, dysuria, hematuria, vaginal discharge, or bleeding  M:       NEGATIVE for significant arthralgias or myalgia  N:       NEGATIVE for weakness, dizziness or paresthesias  E:       NEGATIVE for temperature intolerance, skin/hair changes  P:       NEGATIVE for changes in mood or affect    EXAM:  LMP 05/08/2012    BMI: Body mass index is 28.6 kg/m .  Constitutional: healthy, alert and no distress  Head: Normocephalic. No masses, lesions, tenderness or abnormalities  Neck: Neck supple. Trachea midline. No adenopathy. Thyroid symmetric, normal size.   Cardiovascular: RRR.   Respiratory: Negative.   Breast: No nodularity, asymmetry or nipple discharge bilaterally.  Gastrointestinal: Abdomen soft, non-tender, non-distended. No masses, organomegaly  :  Vulva:  No external lesions, normal female hair distribution, no inguinal adenopathy.    Urethra:  Midline, non-tender, well supported,  no discharge  Vagina:  Atrophic, no abnormal discharge, no lesions  Uterus:  Normal size,  , non-tender, freely mobile  Ovaries:  No masses appreciated, non-tender, mobile  Rectal Exam: deferred  Musculoskeletal: extremities normal  Skin: no suspicious lesions or rashes  Psychiatric: Affect appropriate, cooperative,mentation appears normal.     COUNSELING:   Reviewed preventive health counseling, as reflected in patient instructions   reports that she has never smoked. She has never used smokeless tobacco.    Body mass index is 28.6 kg/m .  Weight management plan: Discussed healthy diet and exercise guidelines    FRAX Risk Assessment  ASSESSMENT:  57 year old  with satisfactory annual exam  (Z00.00) Annual physical exam  (primary encounter diagnosis)  Comment:   Plan: Lipid panel reflex to direct LDL Fasting, Basic        metabolic panel  (Ca, Cl, CO2, Creat, Gluc, K,         Na, BUN), CBC with platelets, TSH with free T4         reflex            (Z12.31) Encounter for screening mammogram for breast cancer  Comment:   Plan: *MA Screening Digital Bilateral            (Z12.11) Colon cancer screening  Comment:   Plan: Colonscopy Screening  Referral            (N95.2) Vaginal atrophy  Comment:  New Rx for vaginal atrophy and not currently in a relationship so will maintain vaginal elasticity with targeted estrogen to the vagina.    Plan: estradiol (ESTRING) 2 MG vaginal ring

## 2022-08-18 LAB
BKR LAB AP GYN ADEQUACY: NORMAL
BKR LAB AP GYN INTERPRETATION: NORMAL
BKR LAB AP HPV REFLEX: NORMAL
BKR LAB AP PREVIOUS ABNORMAL: NORMAL
PATH REPORT.COMMENTS IMP SPEC: NORMAL
PATH REPORT.COMMENTS IMP SPEC: NORMAL
PATH REPORT.RELEVANT HX SPEC: NORMAL

## 2022-08-22 LAB
HUMAN PAPILLOMA VIRUS 16 DNA: NEGATIVE
HUMAN PAPILLOMA VIRUS 18 DNA: NEGATIVE
HUMAN PAPILLOMA VIRUS FINAL DIAGNOSIS: NORMAL
HUMAN PAPILLOMA VIRUS OTHER HR: NEGATIVE

## 2022-08-29 ENCOUNTER — LAB (OUTPATIENT)
Dept: LAB | Facility: CLINIC | Age: 57
End: 2022-08-29
Payer: COMMERCIAL

## 2022-08-29 DIAGNOSIS — Z00.00 ANNUAL PHYSICAL EXAM: ICD-10-CM

## 2022-08-29 LAB
ANION GAP SERPL CALCULATED.3IONS-SCNC: 5 MMOL/L (ref 3–14)
BUN SERPL-MCNC: 13 MG/DL (ref 7–30)
CALCIUM SERPL-MCNC: 9.3 MG/DL (ref 8.5–10.1)
CHLORIDE BLD-SCNC: 106 MMOL/L (ref 94–109)
CHOLEST SERPL-MCNC: 211 MG/DL
CO2 SERPL-SCNC: 25 MMOL/L (ref 20–32)
CREAT SERPL-MCNC: 0.7 MG/DL (ref 0.52–1.04)
ERYTHROCYTE [DISTWIDTH] IN BLOOD BY AUTOMATED COUNT: 12.4 % (ref 10–15)
FASTING STATUS PATIENT QL REPORTED: YES
GFR SERPL CREATININE-BSD FRML MDRD: >90 ML/MIN/1.73M2
GLUCOSE BLD-MCNC: 98 MG/DL (ref 70–99)
HCT VFR BLD AUTO: 38.9 % (ref 35–47)
HDLC SERPL-MCNC: 67 MG/DL
HGB BLD-MCNC: 13.1 G/DL (ref 11.7–15.7)
LDLC SERPL CALC-MCNC: 128 MG/DL
MCH RBC QN AUTO: 29.6 PG (ref 26.5–33)
MCHC RBC AUTO-ENTMCNC: 33.7 G/DL (ref 31.5–36.5)
MCV RBC AUTO: 88 FL (ref 78–100)
NONHDLC SERPL-MCNC: 144 MG/DL
PLATELET # BLD AUTO: 365 10E3/UL (ref 150–450)
POTASSIUM BLD-SCNC: 4.3 MMOL/L (ref 3.4–5.3)
RBC # BLD AUTO: 4.43 10E6/UL (ref 3.8–5.2)
SODIUM SERPL-SCNC: 136 MMOL/L (ref 133–144)
TRIGL SERPL-MCNC: 78 MG/DL
TSH SERPL DL<=0.005 MIU/L-ACNC: 1.93 MU/L (ref 0.4–4)
WBC # BLD AUTO: 6.1 10E3/UL (ref 4–11)

## 2022-08-29 PROCEDURE — 80061 LIPID PANEL: CPT

## 2022-08-29 PROCEDURE — 36415 COLL VENOUS BLD VENIPUNCTURE: CPT

## 2022-08-29 PROCEDURE — 80048 BASIC METABOLIC PNL TOTAL CA: CPT

## 2022-08-29 PROCEDURE — 84443 ASSAY THYROID STIM HORMONE: CPT

## 2022-08-29 PROCEDURE — 85027 COMPLETE CBC AUTOMATED: CPT

## 2022-09-19 ENCOUNTER — ANCILLARY PROCEDURE (OUTPATIENT)
Dept: MAMMOGRAPHY | Facility: CLINIC | Age: 57
End: 2022-09-19
Attending: ADVANCED PRACTICE MIDWIFE
Payer: COMMERCIAL

## 2022-09-19 DIAGNOSIS — Z12.31 VISIT FOR SCREENING MAMMOGRAM: ICD-10-CM

## 2022-09-19 DIAGNOSIS — Z12.31 ENCOUNTER FOR SCREENING MAMMOGRAM FOR BREAST CANCER: ICD-10-CM

## 2022-09-19 PROCEDURE — 77067 SCR MAMMO BI INCL CAD: CPT

## 2022-09-19 PROCEDURE — 77063 BREAST TOMOSYNTHESIS BI: CPT | Mod: 26 | Performed by: RADIOLOGY

## 2022-09-19 PROCEDURE — 77067 SCR MAMMO BI INCL CAD: CPT | Mod: 26 | Performed by: RADIOLOGY

## 2022-10-16 ENCOUNTER — HEALTH MAINTENANCE LETTER (OUTPATIENT)
Age: 57
End: 2022-10-16

## 2023-11-04 ENCOUNTER — HEALTH MAINTENANCE LETTER (OUTPATIENT)
Age: 58
End: 2023-11-04

## 2024-01-03 ENCOUNTER — ANCILLARY PROCEDURE (OUTPATIENT)
Dept: MAMMOGRAPHY | Facility: CLINIC | Age: 59
End: 2024-01-03
Attending: FAMILY MEDICINE
Payer: COMMERCIAL

## 2024-01-03 ENCOUNTER — TELEPHONE (OUTPATIENT)
Dept: CARDIOLOGY | Facility: CLINIC | Age: 59
End: 2024-01-03
Payer: COMMERCIAL

## 2024-01-03 DIAGNOSIS — Z12.31 VISIT FOR SCREENING MAMMOGRAM: ICD-10-CM

## 2024-01-03 PROCEDURE — 77063 BREAST TOMOSYNTHESIS BI: CPT

## 2024-01-03 PROCEDURE — 77063 BREAST TOMOSYNTHESIS BI: CPT | Mod: 26 | Performed by: STUDENT IN AN ORGANIZED HEALTH CARE EDUCATION/TRAINING PROGRAM

## 2024-01-03 PROCEDURE — 77067 SCR MAMMO BI INCL CAD: CPT | Mod: 26 | Performed by: STUDENT IN AN ORGANIZED HEALTH CARE EDUCATION/TRAINING PROGRAM

## 2024-01-05 ENCOUNTER — TELEPHONE (OUTPATIENT)
Dept: CARDIOLOGY | Facility: CLINIC | Age: 59
End: 2024-01-05
Payer: COMMERCIAL

## 2024-01-08 NOTE — TELEPHONE ENCOUNTER
[facility] Call Center    Phone Message    May a detailed message be left on voicemail: yes     Reason for Call: Other: Pt will only see Elsi for CORE enrollment. Please review and call pt to schedule. Thank you      Action Taken: Message routed to:  Other: Cardiology    Travel Screening: Not Applicable      Thank you!  [facility]

## 2024-01-09 ENCOUNTER — TELEPHONE (OUTPATIENT)
Dept: CARDIOLOGY | Facility: CLINIC | Age: 59
End: 2024-01-09
Payer: COMMERCIAL

## 2024-01-09 NOTE — TELEPHONE ENCOUNTER
1/9 spoke to pt  and scheduled pt appt w/ Louann and labs prior   Pt requested to be seen in [place] preferably the day Np Wick is available anything at 6pm and requested to be added to the wait list if anything opens up   Adv noted

## 2024-01-10 ENCOUNTER — TELEPHONE (OUTPATIENT)
Dept: CARDIOLOGY | Facility: CLINIC | Age: 59
End: 2024-01-10
Payer: COMMERCIAL

## 2024-01-17 DIAGNOSIS — I10 HTN (HYPERTENSION): Primary | ICD-10-CM

## 2024-01-18 ENCOUNTER — LAB (OUTPATIENT)
Dept: LAB | Facility: CLINIC | Age: 59
End: 2024-01-18
Payer: COMMERCIAL

## 2024-01-18 ENCOUNTER — ANCILLARY PROCEDURE (OUTPATIENT)
Dept: GENERAL RADIOLOGY | Facility: CLINIC | Age: 59
End: 2024-01-18
Attending: NURSE PRACTITIONER
Payer: COMMERCIAL

## 2024-01-18 ENCOUNTER — OFFICE VISIT (OUTPATIENT)
Dept: CARDIOLOGY | Facility: CLINIC | Age: 59
End: 2024-01-18
Attending: NURSE PRACTITIONER
Payer: COMMERCIAL

## 2024-01-18 ENCOUNTER — LAB (OUTPATIENT)
Dept: LAB | Facility: CLINIC | Age: 59
End: 2024-01-18
Attending: NURSE PRACTITIONER
Payer: COMMERCIAL

## 2024-01-18 VITALS
BODY MASS INDEX: 29.96 KG/M2 | HEART RATE: 74 BPM | WEIGHT: 185.6 LBS | DIASTOLIC BLOOD PRESSURE: 84 MMHG | OXYGEN SATURATION: 98 % | SYSTOLIC BLOOD PRESSURE: 125 MMHG

## 2024-01-18 DIAGNOSIS — J06.9 URI WITH COUGH AND CONGESTION: ICD-10-CM

## 2024-01-18 DIAGNOSIS — E78.5 HYPERLIPIDEMIA LDL GOAL <100: Primary | ICD-10-CM

## 2024-01-18 DIAGNOSIS — I10 HTN (HYPERTENSION): ICD-10-CM

## 2024-01-18 DIAGNOSIS — E78.5 HYPERLIPIDEMIA LDL GOAL <100: ICD-10-CM

## 2024-01-18 LAB
ANION GAP SERPL CALCULATED.3IONS-SCNC: 10 MMOL/L (ref 7–15)
BASOPHILS # BLD AUTO: 0.1 10E3/UL (ref 0–0.2)
BASOPHILS NFR BLD AUTO: 1 %
BUN SERPL-MCNC: 13.7 MG/DL (ref 6–20)
CALCIUM SERPL-MCNC: 9.7 MG/DL (ref 8.6–10)
CHLORIDE SERPL-SCNC: 102 MMOL/L (ref 98–107)
CHOLEST SERPL-MCNC: 256 MG/DL
CREAT SERPL-MCNC: 0.73 MG/DL (ref 0.51–0.95)
CRP SERPL-MCNC: 3.99 MG/L
DEPRECATED HCO3 PLAS-SCNC: 25 MMOL/L (ref 22–29)
EGFRCR SERPLBLD CKD-EPI 2021: >90 ML/MIN/1.73M2
EOSINOPHIL # BLD AUTO: 0.2 10E3/UL (ref 0–0.7)
EOSINOPHIL NFR BLD AUTO: 3 %
ERYTHROCYTE [DISTWIDTH] IN BLOOD BY AUTOMATED COUNT: 12.3 % (ref 10–15)
FASTING STATUS PATIENT QL REPORTED: YES
GLUCOSE SERPL-MCNC: 100 MG/DL (ref 70–99)
HCT VFR BLD AUTO: 42 % (ref 35–47)
HDLC SERPL-MCNC: 78 MG/DL
HGB BLD-MCNC: 14 G/DL (ref 11.7–15.7)
IMM GRANULOCYTES # BLD: 0 10E3/UL
IMM GRANULOCYTES NFR BLD: 0 %
LDLC SERPL CALC-MCNC: 163 MG/DL
LYMPHOCYTES # BLD AUTO: 1.6 10E3/UL (ref 0.8–5.3)
LYMPHOCYTES NFR BLD AUTO: 24 %
MCH RBC QN AUTO: 29.5 PG (ref 26.5–33)
MCHC RBC AUTO-ENTMCNC: 33.3 G/DL (ref 31.5–36.5)
MCV RBC AUTO: 88 FL (ref 78–100)
MONOCYTES # BLD AUTO: 0.5 10E3/UL (ref 0–1.3)
MONOCYTES NFR BLD AUTO: 8 %
NEUTROPHILS # BLD AUTO: 4.3 10E3/UL (ref 1.6–8.3)
NEUTROPHILS NFR BLD AUTO: 64 %
NONHDLC SERPL-MCNC: 178 MG/DL
NRBC # BLD AUTO: 0 10E3/UL
NRBC BLD AUTO-RTO: 0 /100
PLATELET # BLD AUTO: 313 10E3/UL (ref 150–450)
POTASSIUM SERPL-SCNC: 4.3 MMOL/L (ref 3.4–5.3)
RBC # BLD AUTO: 4.75 10E6/UL (ref 3.8–5.2)
SODIUM SERPL-SCNC: 137 MMOL/L (ref 135–145)
TRIGL SERPL-MCNC: 74 MG/DL
WBC # BLD AUTO: 6.8 10E3/UL (ref 4–11)

## 2024-01-18 PROCEDURE — 93005 ELECTROCARDIOGRAM TRACING: CPT

## 2024-01-18 PROCEDURE — 99213 OFFICE O/P EST LOW 20 MIN: CPT | Performed by: NURSE PRACTITIONER

## 2024-01-18 PROCEDURE — 80048 BASIC METABOLIC PNL TOTAL CA: CPT | Performed by: PATHOLOGY

## 2024-01-18 PROCEDURE — 71046 X-RAY EXAM CHEST 2 VIEWS: CPT | Performed by: RADIOLOGY

## 2024-01-18 PROCEDURE — 93010 ELECTROCARDIOGRAM REPORT: CPT | Performed by: INTERNAL MEDICINE

## 2024-01-18 PROCEDURE — 85025 COMPLETE CBC W/AUTO DIFF WBC: CPT | Performed by: PATHOLOGY

## 2024-01-18 PROCEDURE — 36415 COLL VENOUS BLD VENIPUNCTURE: CPT | Performed by: PATHOLOGY

## 2024-01-18 PROCEDURE — 80061 LIPID PANEL: CPT | Performed by: PATHOLOGY

## 2024-01-18 PROCEDURE — 86140 C-REACTIVE PROTEIN: CPT | Performed by: PATHOLOGY

## 2024-01-18 PROCEDURE — 99203 OFFICE O/P NEW LOW 30 MIN: CPT | Performed by: NURSE PRACTITIONER

## 2024-01-18 ASSESSMENT — PAIN SCALES - GENERAL: PAINLEVEL: NO PAIN (0)

## 2024-01-18 NOTE — PROGRESS NOTES
HPI: 58 yr old female presents to cardiology  clinic for evaluation as she is concerned  about her risk for cardiac issues, as both her mother and her brother have Aortic stenosis and atrial fib. She notes she is more fatigued as of late. She walks the dog 45 minutes/day and does not get SOB. She does get SOB with climbing 3 flights of stairs, but recovers quickly. She can climb one flight of stairs without difficulty. She sleeps well. She denies chest pain, arm heaviness, jaw pain with exertion. She does have heart burn relived with TUMS.  This is long standing. She does feel like she has a fast pulse. Noted at night while in bed. She denies irregular beats, or palpitations. Appetite is good, she is trying to lose wt. She had COVID in December and thinks it has effected her sense of taste. Normal BM, no urinary sx. She does carry a diagnosis of diverticulosis.  Has had a colonoscopy which was normal. No skin rashes, lesions.     PAST MEDICAL HISTORY:  Past Medical History:   Diagnosis Date    ASCUS favor benign 8/2015    colp - neg    Congenital anomalies of other endocrine glands 2004    Cyst on thyroid    LSIL (low grade squamous intraepithelial lesion) on Pap smear 6/2014    neg high risk  HPV       FAMILY HISTORY:  Family History   Problem Relation Age of Onset    Atrial fibrillation Mother         pacemaker    Mitral valve prolapse Mother     Heart Disease Father         pacemaker    Cardiovascular Father         Pacemaker    C.A.D. Father     Anemia Brother     Heart Disease Maternal Grandmother         CHF    C.A.D. Maternal Grandmother     Heart Failure Maternal Grandmother     Gastrointestinal Disease Maternal Grandfather     Alcohol/Drug Maternal Grandfather        SOCIAL HISTORY:  Social History   Pt works as an  at [employer] in [place]  Socioeconomic History    Marital status: single       CURRENT MEDICATIONS:  Current Outpatient Medications   Medication Sig Dispense Refill     Multiple Vitamins-Minerals (WOMENS MULTIVITAMIN PLUS PO) Take  by mouth.      estradiol (ESTRING) 2 MG vaginal ring Place 1 each vaginally every 3 months 1 each 3       ROS:   Constitutional: No fever, chills, or sweats. No weight gain/loss.   ENT: No visual disturbance, ear ache, epistaxis, sore throat.   Allergies/Immunologic: Negative.   Respiratory: No cough, hemoptysis.   Cardiovascular: As per HPI.   GI: No nausea, vomiting, hematemesis, melena, or hematochezia.   : No urinary frequency, dysuria, or hematuria.   Integument: Negative.   Psychiatric: Negative.   Neuro: Negative.   Endocrinology: Negative.   Musculoskeletal: Negative.    EXAM:  /84 (BP Location: Left arm, Patient Position: Sitting, Cuff Size: Adult Large)   Pulse 74   Wt 84.2 kg (185 lb 9.6 oz)   LMP 05/08/2012   SpO2 98%   BMI 29.96 kg/m    General: appears comfortable, alert and articulate  Head: normocephalic, atraumatic  Eyes: anicteric sclera, EOMI  Neck: no adenopathy  Orophyarynx: moist mucosa, no lesions, dentition intact  Heart: regular, S1/Split S2, no murmur, gallop, rub, estimated JVP 8cm; Palpable pulses in extremities  Lungs:  course rales at R base, did not clear with coughing, no wheezing  Abdomen: soft, tender RLQ, +, bowel sounds present, no hepatosplenomegaly; No aortic bruit  Extremities: no clubbing, cyanosis or edema  Neurological: normal speech and affect, no gross motor deficits    Labs:  CBC RESULTS:  Lab Results   Component Value Date    WBC 6.1 08/29/2022    WBC 5.2 07/28/2015    RBC 4.43 08/29/2022    RBC 4.23 07/28/2015    HGB 13.1 08/29/2022    HGB 13.0 07/28/2015    HCT 38.9 08/29/2022    HCT 38.9 07/28/2015    MCV 88 08/29/2022    MCV 92 07/28/2015    MCH 29.6 08/29/2022    MCH 30.7 07/28/2015    MCHC 33.7 08/29/2022    MCHC 33.4 07/28/2015    RDW 12.4 08/29/2022    RDW 12.9 07/28/2015     08/29/2022     07/28/2015       CMP RESULTS:  Lab Results   Component Value Date      01/18/2024    POTASSIUM 4.3 01/18/2024    POTASSIUM 4.3 08/29/2022    CHLORIDE 102 01/18/2024    CHLORIDE 106 08/29/2022    CO2 25 01/18/2024    CO2 25 08/29/2022    ANIONGAP 10 01/18/2024    ANIONGAP 5 08/29/2022     (H) 01/18/2024    GLC 98 08/29/2022    BUN 13.7 01/18/2024    BUN 13 08/29/2022    CR 0.73 01/18/2024    GFRESTIMATED >90 01/18/2024    BARRIE 9.7 01/18/2024      Assessment and Plan:   Recent COVID infection with rales in R base. Will get CXR, CBC,  CRP    2. History of elevated  total Chol, LDL, will get fasting lipid profile today, as pt is fasting    3. Family Hx of Aortic Stenosis: Will check Echo to evaluate for valvular disease. Pt does have split S2 on exam.     4. RLQ tenderness - pt has history of diverticulosis. Encouraged to follow up with PCP    5. GERD: relieved with OTC antacid. IF sx progress, encouraged to follow up with PCP.         Plan: Will get labs, CXR today and follow up with patient after echo complete.

## 2024-01-18 NOTE — NURSING NOTE
Chief Complaint   Patient presents with    Follow Up     Enrollment core     Vitals were taken and medications reconciled.    Nupur Fitzpatrick CNA  12:02 PM

## 2024-01-18 NOTE — LETTER
1/18/2024      RE: Peace Boudreaux  [address] [place] [address]       Dear Colleague,    Thank you for the opportunity to participate in the care of your patient, Peace Boudreaux, at the [facility] [place] [facility]. Please see a copy of my visit note below.    HPI: 58 yr old female presents to cardiology  clinic for evaluation as she is concerned  about her risk for cardiac issues, as both her mother and her brother have Aortic stenosis and atrial fib. She notes she is more fatigued as of late. She walks the dog 45 minutes/day and does not get SOB. She does get SOB with climbing 3 flights of stairs, but recovers quickly. She can climb one flight of stairs without difficulty. She sleeps well. She denies chest pain, arm heaviness, jaw pain with exertion. She does have heart burn relived with TUMS.  This is long standing. She does feel like she has a fast pulse. Noted at night while in bed. She denies irregular beats, or palpitations. Appetite is good, she is trying to lose wt. She had COVID in December and thinks it has effected her sense of taste. Normal BM, no urinary sx. She does carry a diagnosis of diverticulosis.  Has had a colonoscopy which was normal. No skin rashes, lesions.     PAST MEDICAL HISTORY:  Past Medical History:   Diagnosis Date    ASCUS favor benign 8/2015    colp - neg    Congenital anomalies of other endocrine glands 2004    Cyst on thyroid    LSIL (low grade squamous intraepithelial lesion) on Pap smear 6/2014    neg high risk  HPV       FAMILY HISTORY:  Family History   Problem Relation Age of Onset    Atrial fibrillation Mother         pacemaker    Mitral valve prolapse Mother     Heart Disease Father         pacemaker    Cardiovascular Father         Pacemaker    C.A.D. Father     Anemia Brother     Heart Disease Maternal Grandmother         CHF    C.A.D. Maternal Grandmother     Heart Failure Maternal  Grandmother     Gastrointestinal Disease Maternal Grandfather     Alcohol/Drug Maternal Grandfather        SOCIAL HISTORY:  Social History   Pt works as an  at [employer] in [place]  Socioeconomic History    Marital status: single       CURRENT MEDICATIONS:  Current Outpatient Medications   Medication Sig Dispense Refill    Multiple Vitamins-Minerals (WOMENS MULTIVITAMIN PLUS PO) Take  by mouth.      estradiol (ESTRING) 2 MG vaginal ring Place 1 each vaginally every 3 months 1 each 3       ROS:   Constitutional: No fever, chills, or sweats. No weight gain/loss.   ENT: No visual disturbance, ear ache, epistaxis, sore throat.   Allergies/Immunologic: Negative.   Respiratory: No cough, hemoptysis.   Cardiovascular: As per HPI.   GI: No nausea, vomiting, hematemesis, melena, or hematochezia.   : No urinary frequency, dysuria, or hematuria.   Integument: Negative.   Psychiatric: Negative.   Neuro: Negative.   Endocrinology: Negative.   Musculoskeletal: Negative.    EXAM:  /84 (BP Location: Left arm, Patient Position: Sitting, Cuff Size: Adult Large)   Pulse 74   Wt 84.2 kg (185 lb 9.6 oz)   LMP 05/08/2012   SpO2 98%   BMI 29.96 kg/m    General: appears comfortable, alert and articulate  Head: normocephalic, atraumatic  Eyes: anicteric sclera, EOMI  Neck: no adenopathy  Orophyarynx: moist mucosa, no lesions, dentition intact  Heart: regular, S1/Split S2, no murmur, gallop, rub, estimated JVP 8cm; Palpable pulses in extremities  Lungs:  course rales at R base, did not clear with coughing, no wheezing  Abdomen: soft, tender RLQ, +, bowel sounds present, no hepatosplenomegaly; No aortic bruit  Extremities: no clubbing, cyanosis or edema  Neurological: normal speech and affect, no gross motor deficits    Labs:  CBC RESULTS:  Lab Results   Component Value Date    WBC 6.1 08/29/2022    WBC 5.2 07/28/2015    RBC 4.43 08/29/2022    RBC 4.23 07/28/2015    HGB 13.1 08/29/2022    HGB 13.0  07/28/2015    HCT 38.9 08/29/2022    HCT 38.9 07/28/2015    MCV 88 08/29/2022    MCV 92 07/28/2015    MCH 29.6 08/29/2022    MCH 30.7 07/28/2015    MCHC 33.7 08/29/2022    MCHC 33.4 07/28/2015    RDW 12.4 08/29/2022    RDW 12.9 07/28/2015     08/29/2022     07/28/2015       CMP RESULTS:  Lab Results   Component Value Date     01/18/2024    POTASSIUM 4.3 01/18/2024    POTASSIUM 4.3 08/29/2022    CHLORIDE 102 01/18/2024    CHLORIDE 106 08/29/2022    CO2 25 01/18/2024    CO2 25 08/29/2022    ANIONGAP 10 01/18/2024    ANIONGAP 5 08/29/2022     (H) 01/18/2024    GLC 98 08/29/2022    BUN 13.7 01/18/2024    BUN 13 08/29/2022    CR 0.73 01/18/2024    GFRESTIMATED >90 01/18/2024    BARRIE 9.7 01/18/2024      Assessment and Plan:   Recent COVID infection with rales in R base. Will get CXR, CBC,  CRP    2. History of elevated  total Chol, LDL, will get fasting lipid profile today, as pt is fasting    3. Family Hx of Aortic Stenosis: Will check Echo to evaluate for valvular disease. Pt does have split S2 on exam.     4. RLQ tenderness - pt has history of diverticulosis. Encouraged to follow up with PCP    5. GERD: relieved with OTC antacid. IF sx progress, encouraged to follow up with PCP.         Plan: Will get labs, CXR today and follow up with patient after echo complete.    Please do not hesitate to contact me if you have any questions/concerns.     Sincerely,     MINNIE Holland CNP

## 2024-01-19 LAB
ATRIAL RATE - MUSE: 67 BPM
DIASTOLIC BLOOD PRESSURE - MUSE: NORMAL MMHG
INTERPRETATION ECG - MUSE: NORMAL
P AXIS - MUSE: 5 DEGREES
PR INTERVAL - MUSE: 152 MS
QRS DURATION - MUSE: 82 MS
QT - MUSE: 390 MS
QTC - MUSE: 412 MS
R AXIS - MUSE: 48 DEGREES
SYSTOLIC BLOOD PRESSURE - MUSE: NORMAL MMHG
T AXIS - MUSE: 36 DEGREES
VENTRICULAR RATE- MUSE: 67 BPM

## 2024-01-19 RX ORDER — ROSUVASTATIN CALCIUM 5 MG/1
5 TABLET, COATED ORAL DAILY
Qty: 90 TABLET | Refills: 3 | Status: SHIPPED | OUTPATIENT
Start: 2024-01-19

## 2024-01-19 NOTE — PROGRESS NOTES
Pt called with Lipids. Will start Crestor 5mg daily and recheck lipids in 6 weeks. Pt agrees   Elsi

## 2024-01-25 ENCOUNTER — HOSPITAL ENCOUNTER (OUTPATIENT)
Dept: CARDIOLOGY | Facility: CLINIC | Age: 59
Discharge: HOME | End: 2024-01-25
Attending: NURSE PRACTITIONER | Admitting: NURSE PRACTITIONER
Payer: COMMERCIAL

## 2024-01-25 DIAGNOSIS — E78.5 HYPERLIPIDEMIA LDL GOAL <100: ICD-10-CM

## 2024-01-25 LAB — LVEF ECHO: NORMAL

## 2024-01-25 PROCEDURE — 93306 TTE W/DOPPLER COMPLETE: CPT | Mod: 26 | Performed by: INTERNAL MEDICINE

## 2024-01-25 PROCEDURE — 93306 TTE W/DOPPLER COMPLETE: CPT

## 2024-01-26 ENCOUNTER — TELEPHONE (OUTPATIENT)
Dept: CARDIOLOGY | Facility: CLINIC | Age: 59
End: 2024-01-26
Payer: COMMERCIAL

## 2024-01-26 DIAGNOSIS — E78.5 HYPERLIPIDEMIA LDL GOAL <100: Primary | ICD-10-CM

## 2024-01-26 NOTE — TELEPHONE ENCOUNTER
----- Message from Jessica Kowalski RN sent at 1/26/2024  2:26 PM CST -----  Regarding: labs  Hello,  Can you please call Elizabeth to schedule her for a fasting lab appointment to recheck her cholesterol numbers in about 8 weeks?    Thanks,  CASA Lopez

## 2024-01-26 NOTE — TELEPHONE ENCOUNTER
Left Voicemail (1st Attempt) and Sent Mychart (1st Attempt) for the patient to call back and schedule the following:    Appointment type: fasting labs  Provider: BIANKA  Return date: in about 8 weeks (3/22/2024)  Specialty phone number: [phone] option 1  Additional appointment(s) needed: NA  Additonal Notes: 1/26 LVM and STANTON for pt to schedule fasting labs in about 8 weeks to check cholesterol. PHILIPPE

## 2024-01-30 ENCOUNTER — TELEPHONE (OUTPATIENT)
Dept: CARDIOLOGY | Facility: CLINIC | Age: 59
End: 2024-01-30
Payer: COMMERCIAL

## 2024-03-26 ENCOUNTER — LAB (OUTPATIENT)
Dept: LAB | Facility: CLINIC | Age: 59
End: 2024-03-26
Payer: COMMERCIAL

## 2024-03-26 DIAGNOSIS — E78.5 HYPERLIPIDEMIA LDL GOAL <100: ICD-10-CM

## 2024-03-26 LAB
CHOLEST SERPL-MCNC: 140 MG/DL
FASTING STATUS PATIENT QL REPORTED: YES
HDLC SERPL-MCNC: 54 MG/DL
LDLC SERPL CALC-MCNC: 72 MG/DL
NONHDLC SERPL-MCNC: 86 MG/DL
TRIGL SERPL-MCNC: 69 MG/DL

## 2024-03-26 PROCEDURE — 80061 LIPID PANEL: CPT

## 2024-03-26 PROCEDURE — 36415 COLL VENOUS BLD VENIPUNCTURE: CPT

## 2024-04-02 ENCOUNTER — OFFICE VISIT (OUTPATIENT)
Dept: INTERNAL MEDICINE | Facility: CLINIC | Age: 59
End: 2024-04-02
Attending: NURSE PRACTITIONER
Payer: COMMERCIAL

## 2024-04-02 VITALS
TEMPERATURE: 97.7 F | HEIGHT: 66 IN | DIASTOLIC BLOOD PRESSURE: 76 MMHG | BODY MASS INDEX: 29.36 KG/M2 | SYSTOLIC BLOOD PRESSURE: 110 MMHG | OXYGEN SATURATION: 98 % | HEART RATE: 71 BPM | WEIGHT: 182.7 LBS

## 2024-04-02 DIAGNOSIS — R09.81 CONGESTION OF PARANASAL SINUS: ICD-10-CM

## 2024-04-02 DIAGNOSIS — Z12.11 SCREEN FOR COLON CANCER: Primary | ICD-10-CM

## 2024-04-02 DIAGNOSIS — J32.0 CHRONIC MAXILLARY SINUSITIS: ICD-10-CM

## 2024-04-02 DIAGNOSIS — R05.2 SUBACUTE COUGH: ICD-10-CM

## 2024-04-02 PROCEDURE — 99213 OFFICE O/P EST LOW 20 MIN: CPT | Mod: GE

## 2024-04-02 RX ORDER — PSEUDOEPHEDRINE HCL 60 MG
60 TABLET ORAL EVERY 4 HOURS PRN
Qty: 90 TABLET | Refills: 2 | Status: CANCELLED | OUTPATIENT
Start: 2024-04-02

## 2024-04-02 RX ORDER — FLUTICASONE PROPIONATE AND SALMETEROL 250; 50 UG/1; UG/1
POWDER RESPIRATORY (INHALATION) 2 TIMES DAILY
COMMUNITY
Start: 2024-02-21

## 2024-04-02 NOTE — PATIENT INSTRUCTIONS
Will obtain CT sinus.    ENT referral was placed.    In the meantime, continue to use Flonase regularly as directed and Sudafed occasionally.

## 2024-04-02 NOTE — PROGRESS NOTES
"I, Santo Fields MD discussed with the resident during the visit, and agree with the resident's findings and plan of care as documented in the resident's note. I was immediately available to the patient should the need have arisen.  /76 (BP Location: Right arm, Patient Position: Sitting, Cuff Size: Adult Large)   Pulse 71   Temp 97.7  F (36.5  C)   Ht 1.676 m (5' 5.98\")   Wt 82.9 kg (182 lb 11.2 oz)   LMP 05/08/2012   SpO2 98%   BMI 29.50 kg/m    I personally reviewed vital signs and past record.  Key findings: COVID in Dec, has had chronic sinusitis symptoms since then. Tried azithromycin. Nasal steroids may have been used suboptimally. ENT by patient desire. In meantime, agree with CT and nasal steroid. Consider adding occasional short-acting decongestant.  (behind the counter sudafed)    "

## 2024-04-02 NOTE — PROGRESS NOTES
"  Assessment & Plan     Chronic Sinusitis, left maxillary sinus  58 yr old female with history of early menopause, diverticulosis (not able to find last colonoscopy report on chart), GERD, and family history of AFib and aortic stenosis (mother and brother) came in due to left maxillary sinusitis symptoms that have been going on since 12/2023 when she had COVID-19. Reported no significant improvement despite use of Flonase, anti-H1, Naväge, and azithro for 5 days. Noted nasal congestion and L maxillary sinus tenderness on exam. No signs for lower respiratory system.  -Instructed regarding proper use of nasal sprays  -Will try pseudoephedrine occasionally  -Continue normal saline flushes  -Placed ENT referral & Ordered CT sinus       BMI  Estimated body mass index is 29.5 kg/m  as calculated from the following:    Height as of this encounter: 1.676 m (5' 5.98\").    Weight as of this encounter: 82.9 kg (182 lb 11.2 oz).       No follow-ups on file.    Thea Johnson is a 58 year old, presenting for the following health issues:  Establish Care (Pt here to establish care and has concerns for paranasal sinus congestion and cough since December) and Referral (Pt would like ENT referral.)      4/2/2024     8:09 AM   Additional Questions   Roomed by Starla Morin   Accompanied by N/A     Via the Health Maintenance questionnaire, the patient has reported the following services have been completed -Colonscopy, this information has been sent to the abstraction team.  History of Present Illness       Reason for visit:  Chronic congestion & intermittent abdominal issues  Symptom onset:  More than a month  Symptoms include:  Congestion & cough  Symptom intensity:  Severe  Symptom progression:  Staying the same  Had these symptoms before:  No  What makes it worse:  No  What makes it better:  No    She eats 2-3 servings of fruits and vegetables daily.She consumes 0 sweetened beverage(s) daily.She exercises with enough effort to " "increase her heart rate 30 to 60 minutes per day.  She exercises with enough effort to increase her heart rate 4 days per week. She is missing 1 dose(s) of medications per week.  She is not taking prescribed medications regularly due to remembering to take.       Patient had COVID-19 in December. Since then, having nasal congestion on both sides. She reports having productive cough with dark green mucus discharge sometimes. She was having cough during the night initially, but now it is more like cough attacks and happens at any time during the day. She started having left-sided facial pain last week. She describes it as pressure feeling. It is worse with leaning forward. Since December, she tried Zyrtec, Flonase, Naväge, and Z-Laron for 5 days a month ago, with some relief but no complete resolution. Patient reports no allergies and no history of prior sinusitis. She does not have apparent dyspnea but she does endorse some decrease with her usual exercise capacity. She has albuterol inhaler at home, but she has not tried it yet as she feels her symptoms are localized above the vocal cords.        Objective    /76 (BP Location: Right arm, Patient Position: Sitting, Cuff Size: Adult Large)   Pulse 71   Temp 97.7  F (36.5  C)   Ht 1.676 m (5' 5.98\")   Wt 82.9 kg (182 lb 11.2 oz)   LMP 05/08/2012   SpO2 98%   BMI 29.50 kg/m    Body mass index is 29.5 kg/m .  Physical Exam   General appearance: Looks comfortable in no apparent distress.  HEENT: Neck supple, sclera anicteric, noted tenderness with palpation to left maxillary sinus, no rhinorrhea, no postnasal drip, no pharyngeal erythema.  CV: S1 and S2 well heard without any added sounds.  Resp: Normal respiratory effort, clear to ausculation bilaterally without any added sounds.  Extr: No peripheral edema.  Skin: Warm and dry.  Neuro: Alert and oriented x4, appeared non-focal.      I saw the patient and discussed the plan with the attending physician,  " Donnie.    Signed Electronically by: Cal Dillon MD

## 2024-04-03 NOTE — TELEPHONE ENCOUNTER
"FUTURE VISIT INFORMATION      FUTURE VISIT INFORMATION:  Date:  4/25/24 - Dr. Rodriguez   Time:  4:20PM  Location: [facility]  REFERRAL INFORMATION:  Referring provider:   Cal Dillon MD  Referring providers clinic:  [facility]   Reason for visit/diagnosis  Chronic maxillary sinusitis- Referred by Cal Dillon MD in [facility]     RECORDS REQUESTED FROM:       Clinic name Comments Records Status Imaging Status   [facility]    4/2/24- Ov Cal Dillon MD Epic     Imaging  4/9/24- CT Sinus  [facility]  Pacs    [facility]  1/18/24- Ov Elsi Lew APRN CNP  Epic             \"Please notify/message CSS if patient completed outside imaging prior to scheduled appointment and/or any outside records that might have been missed at pre visit -Thank you\"  "

## 2024-04-09 ENCOUNTER — HOSPITAL ENCOUNTER (OUTPATIENT)
Dept: CT IMAGING | Facility: CLINIC | Age: 59
Discharge: HOME | End: 2024-04-09
Payer: COMMERCIAL

## 2024-04-09 DIAGNOSIS — J32.0 CHRONIC MAXILLARY SINUSITIS: ICD-10-CM

## 2024-04-09 PROCEDURE — 70486 CT MAXILLOFACIAL W/O DYE: CPT

## 2024-04-09 PROCEDURE — 70486 CT MAXILLOFACIAL W/O DYE: CPT | Mod: 26 | Performed by: RADIOLOGY

## 2024-04-22 ENCOUNTER — TELEPHONE (OUTPATIENT)
Dept: OTOLARYNGOLOGY | Facility: CLINIC | Age: 59
End: 2024-04-22
Payer: COMMERCIAL

## 2024-04-22 ENCOUNTER — TELEPHONE (OUTPATIENT)
Dept: CARDIOLOGY | Facility: CLINIC | Age: 59
End: 2024-04-22
Payer: COMMERCIAL

## 2024-04-22 NOTE — TELEPHONE ENCOUNTER
Phone call: Pt having muscle aches after starting rosuvastain. She tried stopping it for 4 days and felt a bit better. Pt instructed to hold statin for 2 weeks. Then evaluate if symptoms improve.   Also sent in basket message to Dr. Faina Saenz to review recent CT scan of sinus.

## 2024-04-24 ENCOUNTER — TELEPHONE (OUTPATIENT)
Dept: OTOLARYNGOLOGY | Facility: CLINIC | Age: 59
End: 2024-04-24
Payer: COMMERCIAL

## 2024-04-25 ENCOUNTER — OFFICE VISIT (OUTPATIENT)
Dept: OTOLARYNGOLOGY | Facility: CLINIC | Age: 59
End: 2024-04-25
Payer: COMMERCIAL

## 2024-04-25 VITALS
BODY MASS INDEX: 30.02 KG/M2 | HEIGHT: 65 IN | HEART RATE: 80 BPM | OXYGEN SATURATION: 97 % | WEIGHT: 180.2 LBS | DIASTOLIC BLOOD PRESSURE: 84 MMHG | SYSTOLIC BLOOD PRESSURE: 131 MMHG

## 2024-04-25 DIAGNOSIS — J32.0 CHRONIC MAXILLARY SINUSITIS: ICD-10-CM

## 2024-04-25 PROCEDURE — 31231 NASAL ENDOSCOPY DX: CPT | Performed by: STUDENT IN AN ORGANIZED HEALTH CARE EDUCATION/TRAINING PROGRAM

## 2024-04-25 PROCEDURE — 99204 OFFICE O/P NEW MOD 45 MIN: CPT | Mod: 25 | Performed by: STUDENT IN AN ORGANIZED HEALTH CARE EDUCATION/TRAINING PROGRAM

## 2024-04-25 RX ORDER — PREDNISONE 10 MG/1
TABLET ORAL
Qty: 49 TABLET | Refills: 0 | Status: SHIPPED | OUTPATIENT
Start: 2024-04-25 | End: 2024-05-16

## 2024-04-25 RX ORDER — FLUTICASONE PROPIONATE 50 MCG
1 SPRAY, SUSPENSION (ML) NASAL DAILY
COMMUNITY

## 2024-04-25 ASSESSMENT — PAIN SCALES - GENERAL: PAINLEVEL: MODERATE PAIN (4)

## 2024-04-25 NOTE — NURSING NOTE
"Chief Complaint   Patient presents with    Consult   Blood pressure 131/84, pulse 80, height 1.651 m (5' 5\"), weight 81.7 kg (180 lb 3.2 oz), last menstrual period 05/08/2012, SpO2 97%, not currently breastfeeding. Drake Aly, EMT    "

## 2024-04-25 NOTE — LETTER
4/25/2024       RE: Peace Boudreaux  [address]  [place] [address]     Dear Colleague,    Thank you for referring your patient, Peace Boudreaux, to the [facility] [place] [facility]. Please see a copy of my visit note below.      [facility]  New Patient Visit      Encounter date:   April 25, 2024    Referring Provider:   Cal Dillon MD  [address] [place] [id number]    Reason for Visit: New Patient      History of Present Illness:      Peace Boudreaux is a 58 year old female who presents for consultation regarding chronic maxillary sinusitis. Patient endorses nasal congestion. Sinus pain/pressure, and productive cough with green discharge. Her symptoms started after john COVID in December of last year. Has been placed on Azithromycin in the past.  She has also had no significant improvements with Flonase, Zyrtec, anti H1, Z-pack, and Navage. She has been advised to take Sudafed occasionally. Has not had a sinus infection prior to this recent episode. Denies history of allergies. Does have albuterol inhaler at home.     Feels that her medications are not effective. Has been on them for greater than 6 weeks.     Number of episodes per year: Chronic  Facial pain/pressure:  Nasal drainage:  Nasal obstruction: yes  Changes in smell or taste:  Epistaxis:    History of asthma/allergy:    Prior medication trials: Azithromycin, nasal steroids, Navage, Zyrtec, anti H1, sudafed  Prior sinus surgery: n/a  Prior allergy testing with positives: n/a  Prior CT scans: yes    Other otolaryngic complaints:    Sino-Nasal Outcome Test (SNOT - 22)  DNT    Review of Systems:  A comprehensive 14-point review of systems was performed with positives and pertinent negatives listed in the HPI.    Past Medical/Surgical History:  Reviewed today with the patient. No history of major medical comorbidities.  Does not take any blood thinners. No prior history of sinonasal surgery or trauma.    Allergies:       Allergies   Allergen Reactions     No Known Drug Allergy        Medical History:  Past Medical History:   Diagnosis Date     ASCUS favor benign 8/2015    colp - neg     Congenital anomalies of other endocrine glands 2004    Cyst on thyroid     LSIL (low grade squamous intraepithelial lesion) on Pap smear 6/2014    neg high risk  HPV        Surgical History:   Past Surgical History:   Procedure Laterality Date     OPERATIVE HYSTEROSCOPY WITH MORCELLATOR N/A 7/5/2018    Procedure: OPERATIVE HYSTEROSCOPY WITH MORCELLATOR (SUERO & NEPHEW);  OPERATIVE HYSTEROSCOPY AND POLYPECTOMY WITH SUERO NEPHEW MORCELLATOR AND FLUID MANAGEMENT ;  Surgeon: Gerard Cole MD;  Location: [facility]     THYROID BIOPSY  2004     [facility] EXCISE HAND/FOOT NEUROMA  2002        Family History:  Family History   Problem Relation Age of Onset     Atrial fibrillation Mother         pacemaker     Mitral valve prolapse Mother      Heart Disease Father         pacemaker     Cardiovascular Father         Pacemaker     C.A.D. Father      Anemia Brother      Heart Disease Maternal Grandmother         CHF     C.A.D. Maternal Grandmother      Heart Failure Maternal Grandmother      Gastrointestinal Disease Maternal Grandfather      Alcohol/Drug Maternal Grandfather         Social History:   Social History     Socioeconomic History     Marital status: Single     Number of children: 0     Years of education: 16   Occupational History     Occupation:      Employer: [employer]   Tobacco Use     Smoking status: Never     Smokeless tobacco: Never   Substance and Sexual Activity     Alcohol use: Yes     Comment: occ     Drug use: No     Sexual activity: Not Currently     Partners: Male     Birth control/protection: Condom   Social History Narrative    Caffeine intake/servings daily - 0    Calcium intake/servings daily - 2-3     Exercise 4-5 times weekly cardio,wt, aerobics daily walks    Seatbelts used - Yes    Guns stored in the home - No    Self Breast Exam - Yes    Pap test up to date -  Yes    Eye exam up to date -  Yes    Dental exam up to date -  Yes    DEXA scan up to date -  Not Applicable    Flex Sig/Colonoscopy up to date -  Not Applicable    Mammography up to date - No    Immunizations reviewed and up to date - 2002 TD    Abuse: Current or Past (Physical, Sexual or Emotional) - No    Do you feel safe in your environment - Yes    Do you cope well with stress - Yes    Do you suffer from insomnia - No    Last updated by: SOFÍA Lynne RN  5/10/11     Social Determinants of Health     Financial Resource Strain: Low Risk  (4/2/2024)    Financial Resource Strain      Within the past 12 months, have you or your family members you live with been unable to get utilities (heat, electricity) when it was really needed?: No   Food Insecurity: Low Risk  (4/2/2024)    Food Insecurity      Within the past 12 months, did you worry that your food would run out before you got money to buy more?: No      Within the past 12 months, did the food you bought just not last and you didn t have money to get more?: No   Transportation Needs: Low Risk  (4/2/2024)    Transportation Needs      Within the past 12 months, has lack of transportation kept you from medical appointments, getting your medicines, non-medical meetings or appointments, work, or from getting things that you need?: No   Interpersonal Safety: Low Risk  (4/2/2024)    Interpersonal Safety      Do you feel physically and emotionally safe where you currently live?: Yes      Within the past 12 months, have you been hit, slapped, kicked or otherwise physically hurt by someone?: No      Within the past 12 months, have you been humiliated or emotionally abused in other ways by your partner or ex-partner?: No   Housing Stability: Low Risk  (4/2/2024)    Housing Stability      Do you have housing? :  "Yes      Are you worried about losing your housing?: No            Family History:  Reviewed with patient. No pertinent positives.    Physical Examination:  /84 (BP Location: Right arm, Patient Position: Sitting, Cuff Size: Adult Regular)   Pulse 80   Ht 1.651 m (5' 5\")   Wt 81.7 kg (180 lb 3.2 oz)   LMP 05/08/2012   SpO2 97%   BMI 29.99 kg/m      Constitutional/Psychiatric: This is a well-appearing, well-dressed patient in no acute distress. female communicates easily with no obvious speech issues. Oriented to self and environment with appropriate conversation. Does not appear depressed, anxious, or agitated.  Head: Normocephalic. Overall inspection reveals no prior scars, lesions, or masses. Facial motion is symmetric. No sinus tenderness.  Eyes: Extra-ocular motions are intact with symmetric gaze. Conjunctiva clear. No eyelid lesions. Pupils round, symmetric, and reactive to light.  Ears: Auricles without masses or lesions bilaterally. External auditory canals clear with minimal non-obstructive cerumen. Tympanic membranes intact without middle ear fluid or retraction. Hearing intact grossly at conversational volume.  Oral Cavity/Oropharynx: Lips, gingiva and teeth appear healthy. Floor of mouth without masses or lesions, normal appearing salivary glands. Oral mucosal membranes are well-hydrated. Tongue is mobile without deformity. Palate elevates symmetrically. No lesions of the posterior pharynx or tonsillar fossa.  Neck/Lymphatic: Flat, symmetric without detectable lymphadenopathy. No thyroid/salivary gland tenderness or palpable nodules.  Respiratory: No increased work of breathing or respiratory distress. No audible stridor or stertor.  Peripheral/Cardiovascular: Brisk capillary refill bilaterally.   Neurologic: Cranial nerves II-XII grossly intact as tested.    Nasal examination: No lesions, masses, or scars of the external nose are visualized. I do not appreciate any external deviation of the " bony nasal vault. External valves patent without inspiratory alar collapse. Columella is midline.    Imaging Review:  CT SINUS W/O CONTRAST 4/9/2024   Impression:   1. Findings of sinusitis with obstructed bilateral ostiomeatal units.  2. Polypoid opacification between the left middle and inferior  turbinate, may represent polyp. Consider endoscopic correlation.    CT scan of the sinuses was reviewed and independently interpreted by me today.    Procedure Note: Diagnostic Nasal Endoscopy, CPT 52087  Date of Service: April 25, 2024  Provider: Arnulfo Rodriguez MD   Presumptive Diagnosis: No primary diagnosis found.  Anesthesia: Topical lidocaine and oxymetazoline via atomizer    Indication:  Evaluation of nasal/sinus complaints; inadequate visualization with anterior rhinoscopy    Description of procedure:  After obtaining consent for the procedure from the patient, the sinonasal cavity was sprayed with topical anesthetic. A rigid 30-degree nasal endoscope was used to first used to visualize the nasal floor and the nasopharynx on the left. A second pass was then made to visualize the middle meatus and sphenoethmoid recess. Finally, the scope was turned 90-degrees and used to visualize the olfactory cleft and frontal outflow tract. A similar approach was used for the contralateral side. She tolerated the procedure well without difficulty.    Endoscopic Findings:    Dahlen-Valentin Endoscopic Scoring System  Endoscopic observation Right Left   Polyps in middle meatus (0 = absent, 1 = restricted to middle meatus, 2 = Beyond middle meatus) 0 0   Discharge (0 = absent, 1 = thin and clear, 2 = thick, purulent) 0 1   Edema (0 = absent, 1 = mild-moderate, 2 = moderate-severe) 1 1   Crusting (0 = absent, 1 = mild-moderate, 2 = moderate-severe) 0 0   Scarring (0= absent, 1 = mild-moderate, 2 = moderate-severe) 0 0   Total 1 2       Findings:  RT: no evidence of active infection or thick drainage, but with edema around the maxillary  sinus and ethmoids.  LT: evidence of middle meatus edema with thick drainage posteriorly and septal deviation contacting the inferior turbinate. Drainage in the nasopharynx    The patient tolerated the procedure without complication    Final Assessment/Plan:    58 year old female with sinusitis on CT that was most likely brought on by COVID infection in December. As she does not have hx of sinus infections will try to treat with longer course of antibiotics and steroids and obtain a repeat scan. If persistent disease will plan on surgery.     On exam, evidence of active infection and thick drainage.   Start the patient on a tapered course of Augmentin for three weeks (40 mg x7 days, 20 mg x7 days, 10 mg x7 days).  Follow up on May 30th with CT prior.        Scribe Disclosure:   I, Adrián Bunn, am serving as a scribe; to document services personally performed by Arnulfo Rodriguez MD -based on data collection and the provider's statements to me.     Provider Disclosure:  I agree with above History, Review of Systems, Physical exam and Plan.  I have reviewed the content of the documentation and have edited it as needed. I have personally performed the services documented here and the documentation accurately represents those services and the decisions I have made.      Electronically signed by:  Arnulfo Rodriguez MD    [facility]  Rhinology  Endoscopic Skull Base Surgery  [facility]  Department of Otolaryngology - Head & Neck Surgery    45 minutes spent on the date of the encounter doing chart review, review of outside records, review of test results, interpretation of tests, patient visit, documentation and discussion with other provider(s) outside of the procedure performed

## 2024-04-25 NOTE — PROGRESS NOTES
[facility]  New Patient Visit      Encounter date:   April 25, 2024    Referring Provider:   Cal Dillon MD  [address] [place] [address]    Reason for Visit: New Patient      History of Present Illness:      Peace Boudreaux is a 58 year old female who presents for consultation regarding chronic maxillary sinusitis. Patient endorses nasal congestion. Sinus pain/pressure, and productive cough with green discharge. Her symptoms started after john COVID in December of last year. Has been placed on Azithromycin in the past.  She has also had no significant improvements with Flonase, Zyrtec, anti H1, Z-pack, and Navage. She has been advised to take Sudafed occasionally. Has not had a sinus infection prior to this recent episode. Denies history of allergies. Does have albuterol inhaler at home.     Feels that her medications are not effective. Has been on them for greater than 6 weeks.     Number of episodes per year: Chronic  Facial pain/pressure:  Nasal drainage:  Nasal obstruction: yes  Changes in smell or taste:  Epistaxis:    History of asthma/allergy:    Prior medication trials: Azithromycin, nasal steroids, Navage, Zyrtec, anti H1, sudafed  Prior sinus surgery: n/a  Prior allergy testing with positives: n/a  Prior CT scans: yes    Other otolaryngic complaints:    Sino-Nasal Outcome Test (SNOT - 22)  DNT    Review of Systems:  A comprehensive 14-point review of systems was performed with positives and pertinent negatives listed in the HPI.    Past Medical/Surgical History:  Reviewed today with the patient. No history of major medical comorbidities. Does not take any blood thinners. No prior history of sinonasal surgery or trauma.    Allergies:       Allergies   Allergen Reactions    No Known Drug Allergy        Medical History:  Past Medical History:   Diagnosis Date    ASCUS favor benign 8/2015    colp - neg    Congenital anomalies of other endocrine glands 2004    Cyst on  thyroid    LSIL (low grade squamous intraepithelial lesion) on Pap smear 6/2014    neg high risk  HPV        Surgical History:   Past Surgical History:   Procedure Laterality Date    OPERATIVE HYSTEROSCOPY WITH MORCELLATOR N/A 7/5/2018    Procedure: OPERATIVE HYSTEROSCOPY WITH MORCELLATOR (SUERO & NEPHEW);  OPERATIVE HYSTEROSCOPY AND POLYPECTOMY WITH SUERO NEPHEW MORCELLATOR AND FLUID MANAGEMENT ;  Surgeon: Gerard Cole MD;  Location: [facility]    THYROID BIOPSY  2004    [facility] EXCISE HAND/FOOT NEUROMA  2002        Family History:  Family History   Problem Relation Age of Onset    Atrial fibrillation Mother         pacemaker    Mitral valve prolapse Mother     Heart Disease Father         pacemaker    Cardiovascular Father         Pacemaker    C.A.D. Father     Anemia Brother     Heart Disease Maternal Grandmother         CHF    C.A.D. Maternal Grandmother     Heart Failure Maternal Grandmother     Gastrointestinal Disease Maternal Grandfather     Alcohol/Drug Maternal Grandfather         Social History:   Social History     Socioeconomic History    Marital status: Single    Number of children: 0    Years of education: 16   Occupational History    Occupation:      Employer: [employer]   Tobacco Use    Smoking status: Never    Smokeless tobacco: Never   Substance and Sexual Activity    Alcohol use: Yes     Comment: occ    Drug use: No    Sexual activity: Not Currently     Partners: Male     Birth control/protection: Condom   Social History Narrative    Caffeine intake/servings daily - 0    Calcium intake/servings daily - 2-3    Exercise 4-5 times weekly cardio,wt, aerobics daily walks    Seatbelts used - Yes    Guns stored in the home - No    Self Breast Exam - Yes    Pap test up to date -  Yes    Eye exam up to date -  Yes    Dental exam up to date -  Yes    DEXA scan up to date -  Not Applicable    Flex Sig/Colonoscopy up to date -  Not Applicable    Mammography up to date - No  "   Immunizations reviewed and up to date - 2002 TD    Abuse: Current or Past (Physical, Sexual or Emotional) - No    Do you feel safe in your environment - Yes    Do you cope well with stress - Yes    Do you suffer from insomnia - No    Last updated by: SOFÍA Lynne RN  5/10/11     Social Determinants of Health     Financial Resource Strain: Low Risk  (4/2/2024)    Financial Resource Strain     Within the past 12 months, have you or your family members you live with been unable to get utilities (heat, electricity) when it was really needed?: No   Food Insecurity: Low Risk  (4/2/2024)    Food Insecurity     Within the past 12 months, did you worry that your food would run out before you got money to buy more?: No     Within the past 12 months, did the food you bought just not last and you didn t have money to get more?: No   Transportation Needs: Low Risk  (4/2/2024)    Transportation Needs     Within the past 12 months, has lack of transportation kept you from medical appointments, getting your medicines, non-medical meetings or appointments, work, or from getting things that you need?: No   Interpersonal Safety: Low Risk  (4/2/2024)    Interpersonal Safety     Do you feel physically and emotionally safe where you currently live?: Yes     Within the past 12 months, have you been hit, slapped, kicked or otherwise physically hurt by someone?: No     Within the past 12 months, have you been humiliated or emotionally abused in other ways by your partner or ex-partner?: No   Housing Stability: Low Risk  (4/2/2024)    Housing Stability     Do you have housing? : Yes     Are you worried about losing your housing?: No            Family History:  Reviewed with patient. No pertinent positives.    Physical Examination:  /84 (BP Location: Right arm, Patient Position: Sitting, Cuff Size: Adult Regular)   Pulse 80   Ht 1.651 m (5' 5\")   Wt 81.7 kg (180 lb 3.2 oz)   LMP 05/08/2012   SpO2 97%   BMI 29.99 kg/m  "     Constitutional/Psychiatric: This is a well-appearing, well-dressed patient in no acute distress. female communicates easily with no obvious speech issues. Oriented to self and environment with appropriate conversation. Does not appear depressed, anxious, or agitated.  Head: Normocephalic. Overall inspection reveals no prior scars, lesions, or masses. Facial motion is symmetric. No sinus tenderness.  Eyes: Extra-ocular motions are intact with symmetric gaze. Conjunctiva clear. No eyelid lesions. Pupils round, symmetric, and reactive to light.  Ears: Auricles without masses or lesions bilaterally. External auditory canals clear with minimal non-obstructive cerumen. Tympanic membranes intact without middle ear fluid or retraction. Hearing intact grossly at conversational volume.  Oral Cavity/Oropharynx: Lips, gingiva and teeth appear healthy. Floor of mouth without masses or lesions, normal appearing salivary glands. Oral mucosal membranes are well-hydrated. Tongue is mobile without deformity. Palate elevates symmetrically. No lesions of the posterior pharynx or tonsillar fossa.  Neck/Lymphatic: Flat, symmetric without detectable lymphadenopathy. No thyroid/salivary gland tenderness or palpable nodules.  Respiratory: No increased work of breathing or respiratory distress. No audible stridor or stertor.  Peripheral/Cardiovascular: Brisk capillary refill bilaterally.   Neurologic: Cranial nerves II-XII grossly intact as tested.    Nasal examination: No lesions, masses, or scars of the external nose are visualized. I do not appreciate any external deviation of the bony nasal vault. External valves patent without inspiratory alar collapse. Columella is midline.    Imaging Review:  CT SINUS W/O CONTRAST 4/9/2024   Impression:   1. Findings of sinusitis with obstructed bilateral ostiomeatal units.  2. Polypoid opacification between the left middle and inferior  turbinate, may represent polyp. Consider endoscopic  correlation.    CT scan of the sinuses was reviewed and independently interpreted by me today.    Procedure Note: Diagnostic Nasal Endoscopy, CPT 22692  Date of Service: April 25, 2024  Provider: Arnulfo Rodriguez MD   Presumptive Diagnosis: No primary diagnosis found.  Anesthesia: Topical lidocaine and oxymetazoline via atomizer    Indication:  Evaluation of nasal/sinus complaints; inadequate visualization with anterior rhinoscopy    Description of procedure:  After obtaining consent for the procedure from the patient, the sinonasal cavity was sprayed with topical anesthetic. A rigid 30-degree nasal endoscope was used to first used to visualize the nasal floor and the nasopharynx on the left. A second pass was then made to visualize the middle meatus and sphenoethmoid recess. Finally, the scope was turned 90-degrees and used to visualize the olfactory cleft and frontal outflow tract. A similar approach was used for the contralateral side. She tolerated the procedure well without difficulty.    Endoscopic Findings:    [place]-Valentin Endoscopic Scoring System  Endoscopic observation Right Left   Polyps in middle meatus (0 = absent, 1 = restricted to middle meatus, 2 = Beyond middle meatus) 0 0   Discharge (0 = absent, 1 = thin and clear, 2 = thick, purulent) 0 1   Edema (0 = absent, 1 = mild-moderate, 2 = moderate-severe) 1 1   Crusting (0 = absent, 1 = mild-moderate, 2 = moderate-severe) 0 0   Scarring (0= absent, 1 = mild-moderate, 2 = moderate-severe) 0 0   Total 1 2       Findings:  RT: no evidence of active infection or thick drainage, but with edema around the maxillary sinus and ethmoids.  LT: evidence of middle meatus edema with thick drainage posteriorly and septal deviation contacting the inferior turbinate. Drainage in the nasopharynx    The patient tolerated the procedure without complication    Final Assessment/Plan:    58 year old female with sinusitis on CT that was most likely brought on by COVID infection  in December. As she does not have hx of sinus infections will try to treat with longer course of antibiotics and steroids and obtain a repeat scan. If persistent disease will plan on surgery.     On exam, evidence of active infection and thick drainage.   Start the patient on a tapered course of prednisone for three weeks (40 mg x7 days, 20 mg x7 days, 10 mg x7 days). Also start Augmentin  Follow up on May 30th with CT prior.        Scribe Disclosure:   I, Adrián Bunn, am serving as a scribe; to document services personally performed by Arnulfo Rodriguez MD -based on data collection and the provider's statements to me.     Provider Disclosure:  I agree with above History, Review of Systems, Physical exam and Plan.  I have reviewed the content of the documentation and have edited it as needed. I have personally performed the services documented here and the documentation accurately represents those services and the decisions I have made.      Electronically signed by:  Arnulfo Rodriguez MD    [facility]  Rhinology  Endoscopic Skull Base Surgery  [facility]  Department of Otolaryngology - Head & Neck Surgery    45 minutes spent on the date of the encounter doing chart review, review of outside records, review of test results, interpretation of tests, patient visit, documentation and discussion with other provider(s) outside of the procedure performed

## 2024-04-25 NOTE — PATIENT INSTRUCTIONS
You were seen in the ENT Clinic today by Dr. Rodriguez. If you have any questions or concerns after your appointment, please contact us (see below)       2.   The following recommendations have been made based upon your appointment today:   -Start Augmentin: 1 tablet by mouth twice daily for 3 weeks.   -Prednisone: 40 mg by mouth for 7 days, then 20 mg by mouth for 7 days, then 10 mg by mouth for 7 days, then stop.      3.   Plan to return to the ENT clinic on May 30th with a CT scan prior to your appointment.           How to Contact Us:  Send a [employer] message to your provider. Our team will respond to you via [employer]. Occasionally, we will need to call you to get further information.  For urgent matters (Monday-Friday), call the ENT Clinic: [phone] and speak with a call center team member - they will route your call appropriately.   If you'd like to speak directly with a nurse, please find our contact information below. We do our best to check voicemail frequently throughout the day, and will work to call you back within 1-2 days. For urgent matters, please use the general clinic phone numbers listed above.     Debra OCONNOR RN  Direct: [phone]  Alice GREEN LPN  Direct: [phone]         [facility]  Department of Otolaryngology

## 2024-05-21 ENCOUNTER — MYC MEDICAL ADVICE (OUTPATIENT)
Dept: OTOLARYNGOLOGY | Facility: CLINIC | Age: 59
End: 2024-05-21
Payer: COMMERCIAL

## 2024-05-28 ENCOUNTER — HOSPITAL ENCOUNTER (OUTPATIENT)
Dept: CT IMAGING | Facility: CLINIC | Age: 59
Discharge: HOME | End: 2024-05-28
Attending: STUDENT IN AN ORGANIZED HEALTH CARE EDUCATION/TRAINING PROGRAM | Admitting: STUDENT IN AN ORGANIZED HEALTH CARE EDUCATION/TRAINING PROGRAM
Payer: COMMERCIAL

## 2024-05-28 DIAGNOSIS — J32.0 CHRONIC MAXILLARY SINUSITIS: ICD-10-CM

## 2024-05-28 PROCEDURE — 70486 CT MAXILLOFACIAL W/O DYE: CPT

## 2024-05-28 PROCEDURE — 70486 CT MAXILLOFACIAL W/O DYE: CPT | Mod: 26 | Performed by: RADIOLOGY

## 2024-05-30 ENCOUNTER — OFFICE VISIT (OUTPATIENT)
Dept: OTOLARYNGOLOGY | Facility: CLINIC | Age: 59
End: 2024-05-30
Attending: STUDENT IN AN ORGANIZED HEALTH CARE EDUCATION/TRAINING PROGRAM
Payer: COMMERCIAL

## 2024-05-30 ENCOUNTER — MYC MEDICAL ADVICE (OUTPATIENT)
Dept: OTOLARYNGOLOGY | Facility: CLINIC | Age: 59
End: 2024-05-30

## 2024-05-30 ENCOUNTER — PREP FOR PROCEDURE (OUTPATIENT)
Dept: OTOLARYNGOLOGY | Facility: CLINIC | Age: 59
End: 2024-05-30

## 2024-05-30 VITALS
HEART RATE: 89 BPM | WEIGHT: 181 LBS | SYSTOLIC BLOOD PRESSURE: 144 MMHG | HEIGHT: 65 IN | BODY MASS INDEX: 30.16 KG/M2 | OXYGEN SATURATION: 97 % | DIASTOLIC BLOOD PRESSURE: 86 MMHG

## 2024-05-30 DIAGNOSIS — J32.4 CHRONIC PANSINUSITIS: Primary | ICD-10-CM

## 2024-05-30 PROCEDURE — 31231 NASAL ENDOSCOPY DX: CPT | Performed by: STUDENT IN AN ORGANIZED HEALTH CARE EDUCATION/TRAINING PROGRAM

## 2024-05-30 PROCEDURE — 99213 OFFICE O/P EST LOW 20 MIN: CPT | Mod: 25 | Performed by: STUDENT IN AN ORGANIZED HEALTH CARE EDUCATION/TRAINING PROGRAM

## 2024-05-30 RX ORDER — CEFAZOLIN SODIUM 2 G/50ML
2 SOLUTION INTRAVENOUS SEE ADMIN INSTRUCTIONS
Status: CANCELLED | OUTPATIENT
Start: 2024-05-30

## 2024-05-30 RX ORDER — DEXAMETHASONE SODIUM PHOSPHATE 4 MG/ML
10 INJECTION, SOLUTION INTRA-ARTICULAR; INTRALESIONAL; INTRAMUSCULAR; INTRAVENOUS; SOFT TISSUE ONCE
Status: CANCELLED | OUTPATIENT
Start: 2024-05-30 | End: 2024-05-30

## 2024-05-30 RX ORDER — ALBUTEROL SULFATE 90 UG/1
AEROSOL, METERED RESPIRATORY (INHALATION)
COMMUNITY
Start: 2024-02-21

## 2024-05-30 RX ORDER — CEFAZOLIN SODIUM 2 G/50ML
2 SOLUTION INTRAVENOUS
Status: CANCELLED | OUTPATIENT
Start: 2024-05-30

## 2024-05-30 ASSESSMENT — PAIN SCALES - GENERAL: PAINLEVEL: SEVERE PAIN (7)

## 2024-05-30 NOTE — LETTER
"5/30/2024       RE: Peace Boudreaux  [address]  [place] [address]     Dear Colleague,    Thank you for referring your patient, Peace Boudreaux, to the [facility] [place] at [facility]. Please see a copy of my visit note below.      [facility]  Return Visit  Encounter date:   May 30, 2024    Chief Complaint:   Follow up    History of Present Illness:      Peace Boudreaux is a 58 year old female who presents for consultation regarding chronic maxillary sinusitis. Patient endorses nasal congestion. Sinus pain/pressure, and productive cough with green discharge. Her symptoms started after john COVID in December of last year. Has been placed on Azithromycin in the past.  She has also had no significant improvements with Flonase, Zyrtec, anti H1, Z-pack, and Navage. She has been advised to take Sudafed occasionally. Has not had a sinus infection prior to this recent episode. Denies history of allergies. Does have albuterol inhaler at home.      Feels that her medications are not effective. Has been on them for greater than 6 weeks.     Interval History:   Peace Boudreaux is a 59 year old female who presents for follow up. She has noticed some slight improvement with the Prednisone.  Unfortunately symptoms recurred soon after her treatment was finished.  Notes persistent postnasal drip that is thick and malodorous.    Sino-Nasal Outcome Test (SNOT - 22)  [facility] Operative History  N/a    Review of systems: A 14-point review of systems has been conducted and is negative for any notable symptoms, except as dictated in the history of present illness.     Physical Exam:  Vital signs: BP (!) 144/86 (BP Location: Right arm, Patient Position: Sitting, Cuff Size: Adult Regular)   Pulse 89   Ht 1.651 m (5' 5\")   Wt 82.1 kg (181 lb)   LMP 05/08/2012   SpO2 97%   BMI 30.12 kg/m     General " Appearance: No acute distress, appropriate demeanor, conversant  Eyes: moist conjunctivae; EOMI; pupils symmetric; visual acuity grossly intact; no proptosis  Head: normocephalic; overall symmetric appearance without deformity  Face: overall symmetric without deformity  Ears: Normal appearance of external ear  Nose: No external deformity  Oral Cavity/oropharynx: Normal appearance of mucosa  Neck: no palpable lymphadenopathy; thyroid without palpable nodules  Lungs: symmetric chest rise; no wheezing  CV: Good distal perfusion; normal hear rate  Extremities: No deformity  Neurologic Exam: Cranial nerves II-XII are grossly intact      Procedure Note  Procedure performed: Rigid nasal endoscopy  Indication: To evaluate for sinonasal pathology not visualized on routine anterior rhinoscopy  Anesthesia: 4% topical lidocaine with 0.05 % oxymetazoline  Description of procedure: A 30 degree, 3 mm rigid endoscope was inserted into bilateral nasal cavities and the nasal valves, nasal cavity, middle meatus, sphenoethmoid recess, nasopharynx were evaluated for evidence of obstruction, edema, purulence, polyps and/or mass/lesion.     [place]-Valentin Endoscopic Scoring System  Endoscopic observation Right Left   Polyps in middle meatus (0 = absent, 1 = restricted to middle meatus, 2 = Beyond middle meatus) 0 0   Discharge (0 = absent, 1 = thin and clear, 2 = thick, purulent) 0 1   Edema (0 = absent, 1 = mild-moderate, 2 = moderate-severe) 1 1   Crusting (0 = absent, 1 = mild-moderate, 2 = moderate-severe) 0 0   Scarring (0= absent, 1 = mild-moderate, 2 = moderate-severe) 0 0   Total 1 2     Findings  RT: Thick drainage with edema around the middle meatus  LT: Thick drainage with edema around the middle meatus    The patient tolerated the procedure well without complication.     Laboratory Review:  N/a    Imaging Review:  CT Sinus Without Contrast 5/28/2024   Impression: Likely acute or subacute sinusitis particularly in the  right  maxillary sinus, ethmoidal cells, frontal sinus and left  sphenoid locule. Interim considerable resolution of left maxillary  sinusitis.    Pathology Review:  N/a    Assessment/Medical Decision Makin year old female with sinusitis on CT that was most likely brought on by COVID infection in December.  Has now failed extended course of antibiotics, steroids, and multiple prior nasal corticosteroids.    Plan:  Patient has not responded well to medications that have been recommended. She has been on them for longer than 2 months without any noticeable improvement. Discussed with the patient that I believe she would be a good candidate for surgery.  Will plan on bilateral full house functional endoscopic sinus surgery with septoplasty for access.    I discussed the risks, benefits, and alternatives to endoscopic sinonasal surgery, including but not limited to: pain, bleeding, infection, CSF leak, orbital injury resulting in vision changes and/or vision loss, septal perforation and/or hematoma, dental hypesthesia, facial numbness, need for continued medical management after surgery, and need for additional procedures, among others. She was allowed to ask questions, which I answered to the best of my ability. After this discussion, surgery was elected.          Scribe Disclosure:   I, Adrián Bunn, am serving as a scribe; to document services personally performed by Arnulfo Rodriguez MD -based on data collection and the provider's statements to me.     Provider Disclosure:  I agree with above History, Review of Systems, Physical exam and Plan.  I have reviewed the content of the documentation and have edited it as needed. I have personally performed the services documented here and the documentation accurately represents those services and the decisions I have made.      Electronically signed by:  Arnulfo Rodriguez MD    [facility]  Rhinology, Endoscopic Skull Base Surgery  [facility]   Department of Otolaryngology - Head & Neck Surgery    ~~~~~~~~~~~~~~~~~~~~~~~~~~~~~~~~~~~~~~~~~~~~~~~~~~~~~~~~~~~~~~~~~~~~~~~~~~~~~~~~~~~~~~~~~~~~~~~~~~~~~~~~~~~~~~~~~~~~~~~~~~~~~~~~~~~~~~~    Past Medical History:   Diagnosis Date     ASCUS favor benign 8/2015    colp - neg     Congenital anomalies of other endocrine glands 2004    Cyst on thyroid     LSIL (low grade squamous intraepithelial lesion) on Pap smear 6/2014    neg high risk  HPV        Past Surgical History:   Procedure Laterality Date     OPERATIVE HYSTEROSCOPY WITH MORCELLATOR N/A 7/5/2018    Procedure: OPERATIVE HYSTEROSCOPY WITH MORCELLATOR (SUERO & NEPHEW);  OPERATIVE HYSTEROSCOPY AND POLYPECTOMY WITH SUERO NEPHEW MORCELLATOR AND FLUID MANAGEMENT ;  Surgeon: Gerard Cole MD;  [facility]     THYROID BIOPSY  2004     [facility] EXCISE HAND/FOOT NEUROMA  2002        Family History   Problem Relation Age of Onset     Atrial fibrillation Mother         pacemaker     Mitral valve prolapse Mother      Heart Disease Father         pacemaker     Cardiovascular Father         Pacemaker     C.A.D. Father      Anemia Brother      Heart Disease Maternal Grandmother         CHF     C.A.D. Maternal Grandmother      Heart Failure Maternal Grandmother      Gastrointestinal Disease Maternal Grandfather      Alcohol/Drug Maternal Grandfather         Social History     Socioeconomic History     Marital status: Single     Number of children: 0     Years of education: 16   Occupational History     Occupation:      Employer: [employer]   Tobacco Use     Smoking status: Never     Smokeless tobacco: Never   Substance and Sexual Activity     Alcohol use: Yes     Comment: occ     Drug use: No     Sexual activity: Not Currently     Partners: Male     Birth control/protection: Condom   Social History Narrative    Caffeine intake/servings daily - 0    Calcium intake/servings daily - 2-3    Exercise 4-5 times weekly cardio,wt, aerobics  daily walks    Seatbelts used - Yes    Guns stored in the home - No    Self Breast Exam - Yes    Pap test up to date -  Yes    Eye exam up to date -  Yes    Dental exam up to date -  Yes    DEXA scan up to date -  Not Applicable    Flex Sig/Colonoscopy up to date -  Not Applicable    Mammography up to date - No    Immunizations reviewed and up to date - 2002 TD    Abuse: Current or Past (Physical, Sexual or Emotional) - No    Do you feel safe in your environment - Yes    Do you cope well with stress - Yes    Do you suffer from insomnia - No    Last updated by: SOFÍA Lynne RN  5/10/11     Social Determinants of Health     Financial Resource Strain: Low Risk  (4/2/2024)    Financial Resource Strain      Within the past 12 months, have you or your family members you live with been unable to get utilities (heat, electricity) when it was really needed?: No   Food Insecurity: Low Risk  (4/2/2024)    Food Insecurity      Within the past 12 months, did you worry that your food would run out before you got money to buy more?: No      Within the past 12 months, did the food you bought just not last and you didn t have money to get more?: No   Transportation Needs: Low Risk  (4/2/2024)    Transportation Needs      Within the past 12 months, has lack of transportation kept you from medical appointments, getting your medicines, non-medical meetings or appointments, work, or from getting things that you need?: No   Interpersonal Safety: Low Risk  (4/2/2024)    Interpersonal Safety      Do you feel physically and emotionally safe where you currently live?: Yes      Within the past 12 months, have you been hit, slapped, kicked or otherwise physically hurt by someone?: No      Within the past 12 months, have you been humiliated or emotionally abused in other ways by your partner or ex-partner?: No   Housing Stability: Low Risk  (4/2/2024)    Housing Stability      Do you have housing? : Yes      Are you worried about losing your  housing?: No          Teaching Flowsheet - ENT   Relevant Diagnosis: chronic pansinusitis  Teaching Topic:Person(s) involved in teaching: patient       Motivation Level:  Asks Questions:   Yes  Eager to Learn:   Yes  Cooperative:   Yes  Receptive (willing/able to accept information):   Yes  Comments: Reviewed pre-op H and P,  NPO prior to  surgery,  pre-op scrub (given Hibiclens)  Reviewed post-op  cares , activity and pain.     Patient demonstrates understanding of the following:  Reason for the appointment, diagnosis and treatment plan:   Yes  Knowledge of proper use of medications and conditions for which they are ordered (with special attention to potential side effects or drug interactions):  stop aspirin products 1 week before surgery Yes  Which situations necessitate calling provider and whom to contact:   Yes  Nutritional needs and diet plan:   Yes  Pain management techniques:   Yes  Patient instructed on hand hygiene:  Yes  How and/when to access community resources:   Yes     Infection Prevention:  Patient   demonstrates understanding of the following:  Surgical procedure site care taught Yes  Signs and symptoms of infection taught Yes  Wound care taught Yes  Instructional Materials Used/Given: pre- op booklet,verbal instruction.      Again, thank you for allowing me to participate in the care of your patient.      Sincerely,    Arnulfo Rodriguez MD

## 2024-05-30 NOTE — NURSING NOTE
"Chief Complaint   Patient presents with    RECHECK   Blood pressure (!) 144/86, pulse 89, height 1.651 m (5' 5\"), weight 82.1 kg (181 lb), last menstrual period 05/08/2012, SpO2 97%, not currently breastfeeding. Drake Aly, EMT    "

## 2024-05-30 NOTE — PATIENT INSTRUCTIONS
You were seen in the ENT Clinic today by Dr. Rodriguez. If you have any questions or concerns after your appointment, please contact us (see below)       2.   The following recommendations have been made based upon your appointment today:   -we have placed the order for surgery, our schedulers will be reaching out to you in the next couple days          How to Contact Us:  Send a [employer] message to your provider. Our team will respond to you via [employer]. Occasionally, we will need to call you to get further information.  For urgent matters (Monday-Friday), call the ENT Clinic: [phone] and speak with a call center team member - they will route your call appropriately.   If you'd like to speak directly with a nurse, please find our contact information below. We do our best to check voicemail frequently throughout the day, and will work to call you back within 1-2 days. For urgent matters, please use the general clinic phone numbers listed above.     Debra OCONNOR RN  Direct: [phone]  Alice GREEN LPN  Direct: [phone]         [facility]  Department of Otolaryngology

## 2024-05-30 NOTE — PROGRESS NOTES
"  [facility]  Return Visit  Encounter date:   May 30, 2024    Chief Complaint:   Follow up    History of Present Illness:      Peace Boudreaux is a 58 year old female who presents for consultation regarding chronic maxillary sinusitis. Patient endorses nasal congestion. Sinus pain/pressure, and productive cough with green discharge. Her symptoms started after john COVID in December of last year. Has been placed on Azithromycin in the past.  She has also had no significant improvements with Flonase, Zyrtec, anti H1, Z-pack, and Navage. She has been advised to take Sudafed occasionally. Has not had a sinus infection prior to this recent episode. Denies history of allergies. Does have albuterol inhaler at home.      Feels that her medications are not effective. Has been on them for greater than 6 weeks.     Interval History:   Peace Boudreaux is a 59 year old female who presents for follow up. She has noticed some slight improvement with the Prednisone.  Unfortunately symptoms recurred soon after her treatment was finished.  Notes persistent postnasal drip that is thick and malodorous.    Sino-Nasal Outcome Test (SNOT - 22)  DNT    [place] Operative History  N/a    Review of systems: A 14-point review of systems has been conducted and is negative for any notable symptoms, except as dictated in the history of present illness.     Physical Exam:  Vital signs: BP (!) 144/86 (BP Location: Right arm, Patient Position: Sitting, Cuff Size: Adult Regular)   Pulse 89   Ht 1.651 m (5' 5\")   Wt 82.1 kg (181 lb)   LMP 05/08/2012   SpO2 97%   BMI 30.12 kg/m     General Appearance: No acute distress, appropriate demeanor, conversant  Eyes: moist conjunctivae; EOMI; pupils symmetric; visual acuity grossly intact; no proptosis  Head: normocephalic; overall symmetric appearance without deformity  Face: overall symmetric without deformity  Ears: Normal appearance of external ear  Nose: No external " deformity  Oral Cavity/oropharynx: Normal appearance of mucosa  Neck: no palpable lymphadenopathy; thyroid without palpable nodules  Lungs: symmetric chest rise; no wheezing  CV: Good distal perfusion; normal hear rate  Extremities: No deformity  Neurologic Exam: Cranial nerves II-XII are grossly intact      Procedure Note  Procedure performed: Rigid nasal endoscopy  Indication: To evaluate for sinonasal pathology not visualized on routine anterior rhinoscopy  Anesthesia: 4% topical lidocaine with 0.05 % oxymetazoline  Description of procedure: A 30 degree, 3 mm rigid endoscope was inserted into bilateral nasal cavities and the nasal valves, nasal cavity, middle meatus, sphenoethmoid recess, nasopharynx were evaluated for evidence of obstruction, edema, purulence, polyps and/or mass/lesion.     [place] Endoscopic Scoring System  Endoscopic observation Right Left   Polyps in middle meatus (0 = absent, 1 = restricted to middle meatus, 2 = Beyond middle meatus) 0 0   Discharge (0 = absent, 1 = thin and clear, 2 = thick, purulent) 0 1   Edema (0 = absent, 1 = mild-moderate, 2 = moderate-severe) 1 1   Crusting (0 = absent, 1 = mild-moderate, 2 = moderate-severe) 0 0   Scarring (0= absent, 1 = mild-moderate, 2 = moderate-severe) 0 0   Total 1 2     Findings  RT: Thick drainage with edema around the middle meatus  LT: Thick drainage with edema around the middle meatus    The patient tolerated the procedure well without complication.     Laboratory Review:  N/a    Imaging Review:  CT Sinus Without Contrast 2024   Impression: Likely acute or subacute sinusitis particularly in the  right maxillary sinus, ethmoidal cells, frontal sinus and left  sphenoid locule. Interim considerable resolution of left maxillary  sinusitis.    Pathology Review:  N/a    Assessment/Medical Decision Makin year old female with sinusitis on CT that was most likely brought on by COVID infection in December.  Has now failed extended  course of antibiotics, steroids, and multiple prior nasal corticosteroids.    Plan:  Patient has not responded well to medications that have been recommended. She has been on them for longer than 2 months without any noticeable improvement. Discussed with the patient that I believe she would be a good candidate for surgery.  Will plan on bilateral full house functional endoscopic sinus surgery with septoplasty for access.    I discussed the risks, benefits, and alternatives to endoscopic sinonasal surgery, including but not limited to: pain, bleeding, infection, CSF leak, orbital injury resulting in vision changes and/or vision loss, septal perforation and/or hematoma, dental hypesthesia, facial numbness, need for continued medical management after surgery, and need for additional procedures, among others. She was allowed to ask questions, which I answered to the best of my ability. After this discussion, surgery was elected.          Scribe Disclosure:   I, Adrián Bunn, am serving as a scribe; to document services personally performed by Arnulfo Rodriguez MD -based on data collection and the provider's statements to me.     Provider Disclosure:  I agree with above History, Review of Systems, Physical exam and Plan.  I have reviewed the content of the documentation and have edited it as needed. I have personally performed the services documented here and the documentation accurately represents those services and the decisions I have made.      Electronically signed by:  Arnulfo Rodriguez MD    [facility]  Rhinology, Endoscopic Skull Base Surgery  [facility]  Department of Otolaryngology - Head & Neck Surgery    ~~~~~~~~~~~~~~~~~~~~~~~~~~~~~~~~~~~~~~~~~~~~~~~~~~~~~~~~~~~~~~~~~~~~~~~~~~~~~~~~~~~~~~~~~~~~~~~~~~~~~~~~~~~~~~~~~~~~~~~~~~~~~~~~~~~~~~~    Past Medical History:   Diagnosis Date    ASCUS favor benign 8/2015    colp - neg    Congenital anomalies of other endocrine glands 2004     Cyst on thyroid    LSIL (low grade squamous intraepithelial lesion) on Pap smear 6/2014    neg high risk  HPV        Past Surgical History:   Procedure Laterality Date    OPERATIVE HYSTEROSCOPY WITH MORCELLATOR N/A 7/5/2018    Procedure: OPERATIVE HYSTEROSCOPY WITH MORCELLATOR (SUERO & NEPHEW);  OPERATIVE HYSTEROSCOPY AND POLYPECTOMY WITH SUERO NEPHEW MORCELLATOR AND FLUID MANAGEMENT ;  Surgeon: Gerard Cole MD;  Location: [facility]    THYROID BIOPSY  2004    [facility] EXCISE HAND/FOOT NEUROMA  2002        Family History   Problem Relation Age of Onset    Atrial fibrillation Mother         pacemaker    Mitral valve prolapse Mother     Heart Disease Father         pacemaker    Cardiovascular Father         Pacemaker    C.A.D. Father     Anemia Brother     Heart Disease Maternal Grandmother         CHF    C.A.D. Maternal Grandmother     Heart Failure Maternal Grandmother     Gastrointestinal Disease Maternal Grandfather     Alcohol/Drug Maternal Grandfather         Social History     Socioeconomic History    Marital status: Single    Number of children: 0    Years of education: 16   Occupational History    Occupation:      Employer: [employer]   Tobacco Use    Smoking status: Never    Smokeless tobacco: Never   Substance and Sexual Activity    Alcohol use: Yes     Comment: occ    Drug use: No    Sexual activity: Not Currently     Partners: Male     Birth control/protection: Condom   Social History Narrative    Caffeine intake/servings daily - 0    Calcium intake/servings daily - 2-3    Exercise 4-5 times weekly cardio,wt, aerobics daily walks    Seatbelts used - Yes    Guns stored in the home - No    Self Breast Exam - Yes    Pap test up to date -  Yes    Eye exam up to date -  Yes    Dental exam up to date -  Yes    DEXA scan up to date -  Not Applicable    Flex Sig/Colonoscopy up to date -  Not Applicable    Mammography up to date - No    Immunizations reviewed and up to date -  2002 TD    Abuse: Current or Past (Physical, Sexual or Emotional) - No    Do you feel safe in your environment - Yes    Do you cope well with stress - Yes    Do you suffer from insomnia - No    Last updated by: SOFÍA Lynne RN  5/10/11     Social Determinants of Health     Financial Resource Strain: Low Risk  (4/2/2024)    Financial Resource Strain     Within the past 12 months, have you or your family members you live with been unable to get utilities (heat, electricity) when it was really needed?: No   Food Insecurity: Low Risk  (4/2/2024)    Food Insecurity     Within the past 12 months, did you worry that your food would run out before you got money to buy more?: No     Within the past 12 months, did the food you bought just not last and you didn t have money to get more?: No   Transportation Needs: Low Risk  (4/2/2024)    Transportation Needs     Within the past 12 months, has lack of transportation kept you from medical appointments, getting your medicines, non-medical meetings or appointments, work, or from getting things that you need?: No   Interpersonal Safety: Low Risk  (4/2/2024)    Interpersonal Safety     Do you feel physically and emotionally safe where you currently live?: Yes     Within the past 12 months, have you been hit, slapped, kicked or otherwise physically hurt by someone?: No     Within the past 12 months, have you been humiliated or emotionally abused in other ways by your partner or ex-partner?: No   Housing Stability: Low Risk  (4/2/2024)    Housing Stability     Do you have housing? : Yes     Are you worried about losing your housing?: No

## 2024-05-30 NOTE — PROGRESS NOTES
Teaching Flowsheet - ENT   Relevant Diagnosis: chronic pansinusitis  Teaching Topic:Person(s) involved in teaching: patient       Motivation Level:  Asks Questions:   Yes  Eager to Learn:   Yes  Cooperative:   Yes  Receptive (willing/able to accept information):   Yes  Comments: Reviewed pre-op H and P,  NPO prior to  surgery,  pre-op scrub (given Hibiclens)  Reviewed post-op  cares , activity and pain.     Patient demonstrates understanding of the following:  Reason for the appointment, diagnosis and treatment plan:   Yes  Knowledge of proper use of medications and conditions for which they are ordered (with special attention to potential side effects or drug interactions):  stop aspirin products 1 week before surgery Yes  Which situations necessitate calling provider and whom to contact:   Yes  Nutritional needs and diet plan:   Yes  Pain management techniques:   Yes  Patient instructed on hand hygiene:  Yes  How and/when to access community resources:   Yes     Infection Prevention:  Patient   demonstrates understanding of the following:  Surgical procedure site care taught Yes  Signs and symptoms of infection taught Yes  Wound care taught Yes  Instructional Materials Used/Given: pre- op booklet,verbal instruction.

## 2024-05-31 ENCOUNTER — TELEPHONE (OUTPATIENT)
Dept: OTOLARYNGOLOGY | Facility: CLINIC | Age: 59
End: 2024-05-31
Payer: COMMERCIAL

## 2024-05-31 ENCOUNTER — TRANSFERRED RECORDS (OUTPATIENT)
Dept: HEALTH INFORMATION MANAGEMENT | Facility: CLINIC | Age: 59
End: 2024-05-31
Payer: COMMERCIAL

## 2024-05-31 NOTE — TELEPHONE ENCOUNTER
Scheduled surgery with Dr. Rodriguez on 6/10/2024    Spoke with: Patient    Surgery is located at [facility]    Patient will be seen for their H&P by their PCP Dr. Hernandez within 30 days of surgery - Confirmed PCP on file is up to date  - Patient had H&P today    Does patient need a consult before upcoming surgery? No: already done    Anesthesia type: General    Requested Imaging required for surgery: No    Patient needs scheduled for their 1 & 4 week post ops    Patient will receive their surgery packet via [facility] per their preference    Patient was not provided a start time for surgery & is aware they will receive this information 3-5 days before surgery    Additional comments: Patient was instructed to call back with any further questions or concerns.     Kacie Valero on 5/31/2024 at 11:47 AM

## 2024-05-31 NOTE — TELEPHONE ENCOUNTER
Left patient a voicemail to schedule surgery for endoscopic bilateral maxillary antrostomy, total ethmoidectomy, sphenoidotomy, frontal sinusotomy, and endo septoplasty (Bilateral) with Dr. Rodriguez - Left Surgery Scheduling line for callback [phone]    Kacie Valero on 5/31/2024 at 10:35 AM

## 2024-06-03 ENCOUNTER — MYC MEDICAL ADVICE (OUTPATIENT)
Dept: OTOLARYNGOLOGY | Facility: CLINIC | Age: 59
End: 2024-06-03
Payer: COMMERCIAL

## 2024-06-04 ENCOUNTER — PRE VISIT (OUTPATIENT)
Dept: OTOLARYNGOLOGY | Facility: CLINIC | Age: 59
End: 2024-06-04

## 2024-06-07 ENCOUNTER — ANESTHESIA EVENT (OUTPATIENT)
Dept: SURGERY | Facility: AMBULATORY SURGERY CENTER | Age: 59
End: 2024-06-07
Payer: COMMERCIAL

## 2024-06-10 ENCOUNTER — ANESTHESIA (OUTPATIENT)
Dept: SURGERY | Facility: AMBULATORY SURGERY CENTER | Age: 59
End: 2024-06-10
Payer: COMMERCIAL

## 2024-06-10 ENCOUNTER — HOSPITAL ENCOUNTER (OUTPATIENT)
Facility: AMBULATORY SURGERY CENTER | Age: 59
Discharge: HOME | End: 2024-06-10
Attending: STUDENT IN AN ORGANIZED HEALTH CARE EDUCATION/TRAINING PROGRAM | Admitting: STUDENT IN AN ORGANIZED HEALTH CARE EDUCATION/TRAINING PROGRAM
Payer: COMMERCIAL

## 2024-06-10 VITALS
RESPIRATION RATE: 16 BRPM | WEIGHT: 181 LBS | HEIGHT: 65 IN | SYSTOLIC BLOOD PRESSURE: 148 MMHG | TEMPERATURE: 98.8 F | DIASTOLIC BLOOD PRESSURE: 88 MMHG | BODY MASS INDEX: 30.16 KG/M2 | OXYGEN SATURATION: 94 % | HEART RATE: 81 BPM

## 2024-06-10 DIAGNOSIS — J32.4 CHRONIC PANSINUSITIS: Primary | ICD-10-CM

## 2024-06-10 DIAGNOSIS — G89.18 POSTOPERATIVE PAIN: ICD-10-CM

## 2024-06-10 PROCEDURE — 30520 REPAIR OF NASAL SEPTUM: CPT | Mod: GC | Performed by: STUDENT IN AN ORGANIZED HEALTH CARE EDUCATION/TRAINING PROGRAM

## 2024-06-10 PROCEDURE — 88311 DECALCIFY TISSUE: CPT | Mod: 26 | Performed by: PATHOLOGY

## 2024-06-10 PROCEDURE — 31267 ENDOSCOPY MAXILLARY SINUS: CPT | Mod: LT

## 2024-06-10 PROCEDURE — 87075 CULTR BACTERIA EXCEPT BLOOD: CPT | Performed by: STUDENT IN AN ORGANIZED HEALTH CARE EDUCATION/TRAINING PROGRAM

## 2024-06-10 PROCEDURE — 31259 NSL/SINS NDSC SPHN TISS RMVL: CPT | Mod: LT

## 2024-06-10 PROCEDURE — 88311 DECALCIFY TISSUE: CPT | Mod: TC | Performed by: STUDENT IN AN ORGANIZED HEALTH CARE EDUCATION/TRAINING PROGRAM

## 2024-06-10 PROCEDURE — 31276 NSL/SINS NDSC FRNT TISS RMVL: CPT | Mod: RT

## 2024-06-10 PROCEDURE — 99000 SPECIMEN HANDLING OFFICE-LAB: CPT | Performed by: PATHOLOGY

## 2024-06-10 PROCEDURE — 30520 REPAIR OF NASAL SEPTUM: CPT | Performed by: ANESTHESIOLOGY

## 2024-06-10 PROCEDURE — 30520 REPAIR OF NASAL SEPTUM: CPT | Performed by: NURSE ANESTHETIST, CERTIFIED REGISTERED

## 2024-06-10 PROCEDURE — 31259 NSL/SINS NDSC SPHN TISS RMVL: CPT | Mod: 50 | Performed by: STUDENT IN AN ORGANIZED HEALTH CARE EDUCATION/TRAINING PROGRAM

## 2024-06-10 PROCEDURE — 30520 REPAIR OF NASAL SEPTUM: CPT

## 2024-06-10 PROCEDURE — 87070 CULTURE OTHR SPECIMN AEROBIC: CPT | Performed by: STUDENT IN AN ORGANIZED HEALTH CARE EDUCATION/TRAINING PROGRAM

## 2024-06-10 PROCEDURE — 31276 NSL/SINS NDSC FRNT TISS RMVL: CPT | Mod: 50 | Performed by: STUDENT IN AN ORGANIZED HEALTH CARE EDUCATION/TRAINING PROGRAM

## 2024-06-10 PROCEDURE — 61782 SCAN PROC CRANIAL EXTRA: CPT | Mod: GC | Performed by: STUDENT IN AN ORGANIZED HEALTH CARE EDUCATION/TRAINING PROGRAM

## 2024-06-10 PROCEDURE — 31267 ENDOSCOPY MAXILLARY SINUS: CPT | Mod: 50 | Performed by: STUDENT IN AN ORGANIZED HEALTH CARE EDUCATION/TRAINING PROGRAM

## 2024-06-10 PROCEDURE — 88305 TISSUE EXAM BY PATHOLOGIST: CPT | Mod: 26 | Performed by: PATHOLOGY

## 2024-06-10 RX ORDER — OXYCODONE HYDROCHLORIDE 5 MG/1
5 TABLET ORAL
Status: COMPLETED | OUTPATIENT
Start: 2024-06-10 | End: 2024-06-10

## 2024-06-10 RX ORDER — DEXMEDETOMIDINE HYDROCHLORIDE 4 UG/ML
INJECTION, SOLUTION INTRAVENOUS PRN
Status: DISCONTINUED | OUTPATIENT
Start: 2024-06-10 | End: 2024-06-10

## 2024-06-10 RX ORDER — ONDANSETRON 4 MG/1
4 TABLET, ORALLY DISINTEGRATING ORAL EVERY 30 MIN PRN
Status: DISCONTINUED | OUTPATIENT
Start: 2024-06-10 | End: 2024-06-11 | Stop reason: HOSPADM

## 2024-06-10 RX ORDER — ONDANSETRON 2 MG/ML
4 INJECTION INTRAMUSCULAR; INTRAVENOUS EVERY 30 MIN PRN
Status: DISCONTINUED | OUTPATIENT
Start: 2024-06-10 | End: 2024-06-11 | Stop reason: HOSPADM

## 2024-06-10 RX ORDER — ONDANSETRON 2 MG/ML
INJECTION INTRAMUSCULAR; INTRAVENOUS PRN
Status: DISCONTINUED | OUTPATIENT
Start: 2024-06-10 | End: 2024-06-10

## 2024-06-10 RX ORDER — FENTANYL CITRATE 50 UG/ML
50 INJECTION, SOLUTION INTRAMUSCULAR; INTRAVENOUS EVERY 5 MIN PRN
Status: DISCONTINUED | OUTPATIENT
Start: 2024-06-10 | End: 2024-06-11 | Stop reason: HOSPADM

## 2024-06-10 RX ORDER — HYDROMORPHONE HYDROCHLORIDE 1 MG/ML
0.4 INJECTION, SOLUTION INTRAMUSCULAR; INTRAVENOUS; SUBCUTANEOUS EVERY 5 MIN PRN
Status: DISCONTINUED | OUTPATIENT
Start: 2024-06-10 | End: 2024-06-11 | Stop reason: HOSPADM

## 2024-06-10 RX ORDER — DOCUSATE SODIUM 100 MG/1
100 CAPSULE, LIQUID FILLED ORAL 2 TIMES DAILY PRN
Qty: 20 CAPSULE | Refills: 0 | Status: SHIPPED | OUTPATIENT
Start: 2024-06-10

## 2024-06-10 RX ORDER — LIDOCAINE HYDROCHLORIDE AND EPINEPHRINE 10; 10 MG/ML; UG/ML
INJECTION, SOLUTION INFILTRATION; PERINEURAL PRN
Status: DISCONTINUED | OUTPATIENT
Start: 2024-06-10 | End: 2024-06-10 | Stop reason: HOSPADM

## 2024-06-10 RX ORDER — TRIAMCINOLONE ACETONIDE 40 MG/ML
INJECTION, SUSPENSION INTRA-ARTICULAR; INTRAMUSCULAR PRN
Status: DISCONTINUED | OUTPATIENT
Start: 2024-06-10 | End: 2024-06-10 | Stop reason: HOSPADM

## 2024-06-10 RX ORDER — FENTANYL CITRATE 50 UG/ML
25 INJECTION, SOLUTION INTRAMUSCULAR; INTRAVENOUS EVERY 5 MIN PRN
Status: DISCONTINUED | OUTPATIENT
Start: 2024-06-10 | End: 2024-06-11 | Stop reason: HOSPADM

## 2024-06-10 RX ORDER — CEFAZOLIN SODIUM 2 G/50ML
2 SOLUTION INTRAVENOUS
Status: COMPLETED | OUTPATIENT
Start: 2024-06-10 | End: 2024-06-10

## 2024-06-10 RX ORDER — ACETAMINOPHEN 325 MG/1
975 TABLET ORAL ONCE
Status: COMPLETED | OUTPATIENT
Start: 2024-06-10 | End: 2024-06-10

## 2024-06-10 RX ORDER — FENTANYL CITRATE 50 UG/ML
INJECTION, SOLUTION INTRAMUSCULAR; INTRAVENOUS PRN
Status: DISCONTINUED | OUTPATIENT
Start: 2024-06-10 | End: 2024-06-10

## 2024-06-10 RX ORDER — DEXAMETHASONE SODIUM PHOSPHATE 10 MG/ML
4 INJECTION, SOLUTION INTRAMUSCULAR; INTRAVENOUS
Status: DISCONTINUED | OUTPATIENT
Start: 2024-06-10 | End: 2024-06-11 | Stop reason: HOSPADM

## 2024-06-10 RX ORDER — EPINEPHRINE 1 MG/ML
INJECTION INTRAMUSCULAR; INTRAVENOUS; SUBCUTANEOUS PRN
Status: DISCONTINUED | OUTPATIENT
Start: 2024-06-10 | End: 2024-06-10 | Stop reason: HOSPADM

## 2024-06-10 RX ORDER — LIDOCAINE HYDROCHLORIDE 20 MG/ML
INJECTION, SOLUTION INFILTRATION; PERINEURAL PRN
Status: DISCONTINUED | OUTPATIENT
Start: 2024-06-10 | End: 2024-06-10

## 2024-06-10 RX ORDER — HYDROMORPHONE HYDROCHLORIDE 1 MG/ML
0.2 INJECTION, SOLUTION INTRAMUSCULAR; INTRAVENOUS; SUBCUTANEOUS EVERY 5 MIN PRN
Status: DISCONTINUED | OUTPATIENT
Start: 2024-06-10 | End: 2024-06-11 | Stop reason: HOSPADM

## 2024-06-10 RX ORDER — NALOXONE HYDROCHLORIDE 0.4 MG/ML
0.1 INJECTION, SOLUTION INTRAMUSCULAR; INTRAVENOUS; SUBCUTANEOUS
Status: DISCONTINUED | OUTPATIENT
Start: 2024-06-10 | End: 2024-06-11 | Stop reason: HOSPADM

## 2024-06-10 RX ORDER — ONDANSETRON 4 MG/1
4 TABLET, ORALLY DISINTEGRATING ORAL EVERY 6 HOURS PRN
Qty: 8 TABLET | Refills: 0 | Status: SHIPPED | OUTPATIENT
Start: 2024-06-10

## 2024-06-10 RX ORDER — CEFAZOLIN SODIUM 2 G/50ML
2 SOLUTION INTRAVENOUS SEE ADMIN INSTRUCTIONS
Status: DISCONTINUED | OUTPATIENT
Start: 2024-06-10 | End: 2024-06-11 | Stop reason: HOSPADM

## 2024-06-10 RX ORDER — DEXAMETHASONE SODIUM PHOSPHATE 10 MG/ML
10 INJECTION, SOLUTION INTRAMUSCULAR; INTRAVENOUS ONCE
Status: COMPLETED | OUTPATIENT
Start: 2024-06-10 | End: 2024-06-10

## 2024-06-10 RX ORDER — PROPOFOL 10 MG/ML
INJECTION, EMULSION INTRAVENOUS CONTINUOUS PRN
Status: DISCONTINUED | OUTPATIENT
Start: 2024-06-10 | End: 2024-06-10

## 2024-06-10 RX ORDER — SODIUM CHLORIDE, SODIUM LACTATE, POTASSIUM CHLORIDE, CALCIUM CHLORIDE 600; 310; 30; 20 MG/100ML; MG/100ML; MG/100ML; MG/100ML
INJECTION, SOLUTION INTRAVENOUS CONTINUOUS
Status: DISCONTINUED | OUTPATIENT
Start: 2024-06-10 | End: 2024-06-11 | Stop reason: HOSPADM

## 2024-06-10 RX ORDER — OXYCODONE HYDROCHLORIDE 5 MG/1
10 TABLET ORAL
Status: DISCONTINUED | OUTPATIENT
Start: 2024-06-10 | End: 2024-06-11 | Stop reason: HOSPADM

## 2024-06-10 RX ORDER — PROPOFOL 10 MG/ML
INJECTION, EMULSION INTRAVENOUS PRN
Status: DISCONTINUED | OUTPATIENT
Start: 2024-06-10 | End: 2024-06-10

## 2024-06-10 RX ORDER — OXYCODONE HYDROCHLORIDE 5 MG/1
5 TABLET ORAL EVERY 6 HOURS PRN
Qty: 12 TABLET | Refills: 0 | Status: SHIPPED | OUTPATIENT
Start: 2024-06-10 | End: 2024-06-13

## 2024-06-10 RX ORDER — LIDOCAINE 40 MG/G
CREAM TOPICAL
Status: DISCONTINUED | OUTPATIENT
Start: 2024-06-10 | End: 2024-06-11 | Stop reason: HOSPADM

## 2024-06-10 RX ADMIN — FENTANYL CITRATE 50 MCG: 50 INJECTION, SOLUTION INTRAMUSCULAR; INTRAVENOUS at 11:38

## 2024-06-10 RX ADMIN — DEXAMETHASONE SODIUM PHOSPHATE 10 MG: 10 INJECTION, SOLUTION INTRAMUSCULAR; INTRAVENOUS at 11:03

## 2024-06-10 RX ADMIN — SODIUM CHLORIDE, SODIUM LACTATE, POTASSIUM CHLORIDE, CALCIUM CHLORIDE: 600; 310; 30; 20 INJECTION, SOLUTION INTRAVENOUS at 10:40

## 2024-06-10 RX ADMIN — FENTANYL CITRATE 25 MCG: 50 INJECTION, SOLUTION INTRAMUSCULAR; INTRAVENOUS at 14:45

## 2024-06-10 RX ADMIN — PROPOFOL 150 MG: 10 INJECTION, EMULSION INTRAVENOUS at 11:03

## 2024-06-10 RX ADMIN — ONDANSETRON 4 MG: 2 INJECTION INTRAMUSCULAR; INTRAVENOUS at 13:47

## 2024-06-10 RX ADMIN — PROPOFOL 50 MG: 10 INJECTION, EMULSION INTRAVENOUS at 11:55

## 2024-06-10 RX ADMIN — PROPOFOL 200 MCG/KG/MIN: 10 INJECTION, EMULSION INTRAVENOUS at 12:12

## 2024-06-10 RX ADMIN — DEXMEDETOMIDINE HYDROCHLORIDE 16 MCG: 4 INJECTION, SOLUTION INTRAVENOUS at 12:33

## 2024-06-10 RX ADMIN — Medication 150 MCG: at 11:27

## 2024-06-10 RX ADMIN — Medication 0.5 MG: at 11:56

## 2024-06-10 RX ADMIN — PROPOFOL 200 MCG/KG/MIN: 10 INJECTION, EMULSION INTRAVENOUS at 12:42

## 2024-06-10 RX ADMIN — PROPOFOL 200 MCG/KG/MIN: 10 INJECTION, EMULSION INTRAVENOUS at 11:01

## 2024-06-10 RX ADMIN — PROPOFOL 200 MCG/KG/MIN: 10 INJECTION, EMULSION INTRAVENOUS at 13:11

## 2024-06-10 RX ADMIN — CEFAZOLIN SODIUM 2 G: 2 SOLUTION INTRAVENOUS at 11:06

## 2024-06-10 RX ADMIN — ACETAMINOPHEN 975 MG: 325 TABLET ORAL at 10:26

## 2024-06-10 RX ADMIN — FENTANYL CITRATE 50 MCG: 50 INJECTION, SOLUTION INTRAMUSCULAR; INTRAVENOUS at 10:58

## 2024-06-10 RX ADMIN — PROPOFOL 150 MCG/KG/MIN: 10 INJECTION, EMULSION INTRAVENOUS at 11:35

## 2024-06-10 RX ADMIN — LIDOCAINE HYDROCHLORIDE 80 MG: 20 INJECTION, SOLUTION INFILTRATION; PERINEURAL at 11:03

## 2024-06-10 RX ADMIN — OXYCODONE HYDROCHLORIDE 5 MG: 5 TABLET ORAL at 14:44

## 2024-06-10 RX ADMIN — Medication 50 MG: at 11:03

## 2024-06-10 NOTE — OP NOTE
DATE OF PROCEDURE:  Milly 10, 2024   ATTENDING SURGEON: Arnulfo Rodriguez MD  ASSISTANT SURGEON: Michael Lockwood MD     PREOPERATIVE DIAGNOSES:  1. Chronic maxillary sinusitis  2. Chronic ethmoid sinusitis  3. Chronic sphenoid sinusitis  4. Chronic frontal sinusitis  5. Nasal septal deviation        POSTOPERATIVE DIAGNOSES:  1. Chronic maxillary sinusitis  2. Chronic ethmoid sinusitis  3. Chronic sphenoid sinusitis  4. Chronic frontal sinusitis  5. Nasal septal deviation     PROCEDURES PERFORMED:  1. Bilateral endoscopic maxillary antrostomy with tissue removal  2. Bilateral endoscopic total sphenoethmoidectomy with tissue removal  3. Bilateral endoscopic frontal sinusotomy  4. Bilateral outfracture of the inferior turbinates  5. Septoplasty  6. Stereotactic image-guided surgery, CPT 38053.     FINDINGS: Significant purulence and inflammation/polypoid degeneration of all sinuses.     ANESTHESIA: General.  EBL: 50 cc.  SPECIMEN: microdebrider contents  COMPLICATIONS: None     INDICATIONS: The patient is a 59 year old female with long-standing chronic rhinosinusitis. Prior therapy has included both systemic and topical steroids, antibiotics, antihistamines, and nasal saline irrigation; however, these therapies have failed to bring her long-standing relief of symptoms. Physical examination with nasal endoscopy revealed widespread sinonasal edema as well as discolored nasal discharge. Imaging of the sinuses revealed mucoperiosteal thickening in the sinuses. The risks and expectations of endoscopic sinus surgery were reviewed with the patient who agreed to proceed.     JUSTIFICATION FOR CPT 12246: Sphenoid & posterior ethmoid surgery, frontal sinus surgery and skull base disease     PROCEDURE:  After informed consent was given, the patient was placed under satisfactory anesthesia by the anesthesiologist. The nose was topically decongested with pledgets moistened with 1:1,000 epinephrine. The bed was rotated, and the patient  was prepped and draped in the usual fashion. The patient was identified and a surgical time out was performed. Next, injections with 1% lidocaine with 1:100,000 epinephrine were performed in the region of the agger nasi, septum, and lateral nasal walls bilaterally.      STEREOTACTIC IMAGE-GUIDED SURGERY: The Fusion navigation device was used to perform stereotactic navigation. The tracking instruments were calibrated appropriately with the headgear, and topical landmarks were confirmed to assure accuracy. During the case, the navigation probes were used to confirm our location along the skull base.       SEPTOPLASTY: A left hemitransfixion incision was performed down to the level of the cartilage with a 15-blade.  Next, a Tanika elevator was used to elevate the left septal flap in a submucoperichondrial plane.  An incision was made in the septal cartilage 1.5 cm posterior to the caudal septum and a contralateral flap was raised in a similar fashion.  The quadrangular cartilage was disarticulated from the bony septum.  Deviated portions of cartilage were then removed using the endoscopic scissors and a Abner forcep.  Bony deviations were then addressed in a similar fashion.  Hemostasis was verified.   The anterior hemitransfixion incision was then closed using 4-0 chromic suture.       ENDOSCOPIC SINUS SURGERY: We first began with the 0  nasal endoscope on the left.  The pledgets were removed and the inferior turbinate was gently infractured and then outfractured with a [place] elevator.  The middle turbinate was then medialized with the [place] elevator. The uncinate process was reflected anteriorly with a [place] probe and taken down with the backbiter at the junction of the vertical and horizontal components.  Next through-cutting instruments and the microdebrider were used to resect the uncinate process.  The natural os of the maxillary sinus was then identified and the straight through cutting forceps were used to  create our antrostomy.  The antrostomy was further widened using a straight through-cut and microdebrider.  Significant amount of purulence was encountered within the maxillary sinus alongside polypoid degeneration of the opening and internal tissues which was removed with the microdebrider and up through cut instrument.     Next the basal lamella was identified and the ethmoid bulla cells were taken down with the J curette and microdebrider.  The ethmoid cells were found to be edematous and polypoid in nature. the basal lamella was then penetrated at the level of the natural ostia of the maxillary sinus and the superior turbinate was identified.  The inferior portion of the superior turbinate was subsequently taken down revealing the natural ostia of the sphenoid sinus.  Polypoid tissue was present at the opening of the sphenoid sinus which was removed with the straight microdebrider.  A straight mushroom punch and [place] was then used to widen the sphenoid os laterally towards the orbit and superiorly towards the skull base.  Residual posterior ethmoid cells were then taken down using a 45  through-cutting forcep and the microdebrider.  Once the posterior ethmoid skull base was identified we switched to the 30  endoscope and curved instrumentation.  We then performed a completion ethmoidectomy from posterior to anterior taking down residual ethmoid septations with a 45  through-cutting forcep and the curved microdebrider.  There is a significant amount of polypoid tissue within this area which was all removed.  Areas of significant dereje-osteogenesis were taken down using the Cobra forcep.  Care was taken to avoid injury to the anterior and posterior ethmoid arteries.  Residual agger nasi cells were taken down using the 45  through cut to expose the frontal recess. The natural ostia of the frontal sinus was then identified and intervening frontal sinus cells were taken down with combination of angled giraffe  forceps and the Cobra.  The frontal beak was taken down and the frontal sinusotomy was widened to its maximum extent using a frontal mushroom punch and the Hosemann.  The nose was then copiously irrigated and all debris and bone chips were removed.      We then turned our attention to the opposing side where a similar procedure was performed for the maxillary antrostomy with tissue removal, total sphenoethmoidectomy with tissue removal, and frontal sinusotomy. Culture was obtained from the maxillary sinus and sen to the lab for aerobic and anaerobic speciation.        At the termination of the case, hemostasis was assured. Posisep dressing was placed into the sinus cavity and inflated with kenalog 40. All counts were correct times two. An orogastric tube was used to suction gastric and pharyngeal contents. The patient was then returned to the anesthesiologist for awakening.       I was present for and performed all critical portions of the surgery

## 2024-06-10 NOTE — ANESTHESIA POSTPROCEDURE EVALUATION
Patient: Peace Boudreaux    Procedure: Procedure(s):  Endoscopic bilateral maxillary antrostomy, total ethmoidectomy, sphenoidotomy, frontal sinusotomy, and endo septoplasty       Anesthesia Type:  MAC    Note:  Disposition: Outpatient   Postop Pain Control: Uneventful            Sign Out: Well controlled pain   PONV: No   Neuro/Psych: Uneventful            Sign Out: Acceptable/Baseline neuro status   Airway/Respiratory: Uneventful            Sign Out: Acceptable/Baseline resp. status   CV/Hemodynamics: Uneventful            Sign Out: Acceptable CV status; No obvious hypovolemia; No obvious fluid overload   Other NRE: NONE   DID A NON-ROUTINE EVENT OCCUR? No           Last vitals:  Vitals Value Taken Time   /81 06/10/24 1430   Temp 36.7  C (98  F) 06/10/24 1404   Pulse 88 06/10/24 1433   Resp 14 06/10/24 1433   SpO2 98 % 06/10/24 1433   Vitals shown include unfiled device data.    Electronically Signed By: Kuldip Ray DO  Milly 10, 2024  2:35 PM

## 2024-06-10 NOTE — ANESTHESIA CARE TRANSFER NOTE
Patient: Peace Boudreaux    Procedure: Procedure(s):  Endoscopic bilateral maxillary antrostomy, total ethmoidectomy, sphenoidotomy, frontal sinusotomy, and endo septoplasty       Diagnosis: Chronic pansinusitis [J32.4]  Diagnosis Additional Information: No value filed.    Anesthesia Type:   MAC     Note:    Oropharynx: oropharynx clear of all foreign objects and spontaneously breathing  Level of Consciousness: awake  Oxygen Supplementation: nasal cannula  Level of Supplemental Oxygen (L/min / FiO2): 2  Independent Airway: airway patency satisfactory and stable  Dentition: dentition unchanged  Vital Signs Stable: post-procedure vital signs reviewed and stable  Report to RN Given: handoff report given  Patient transferred to: PACU    Handoff Report: Identifed the Patient, Identified the Reponsible Provider, Reviewed the pertinent medical history, Discussed the surgical course, Reviewed Intra-OP anesthesia mangement and issues during anesthesia, Set expectations for post-procedure period and Allowed opportunity for questions and acknowledgement of understanding  Vitals:  Vitals Value Taken Time   /81 06/10/24 1403   Temp     Pulse 88 06/10/24 1404   Resp 15 06/10/24 1404   SpO2 98 % 06/10/24 1404   Vitals shown include unfiled device data.    Electronically Signed By: MINNIE Santiago CRNA  Milly 10, 2024  2:04 PM

## 2024-06-10 NOTE — DISCHARGE INSTRUCTIONS
"Dr. Rodriguez's Surgical Discharge Instructions     1. Start rinsing the nasal cavities with ocean saline spray as needed for congestion and nasal saline rinse 2 or more times daily the day after surgery  2. Take medications as prescribed.    3. You have been prescribed a narcotic pain medication (oxycodone) to take as needed for pain not controlled with tylenol and a nasal saline spray.  If needed we will also send you home on anti-nausea medication  4. Do not drive or operate machinery while taking narcotic pain medication.   5. For nasal bleeding that is bothersome or excessive, spray afrin (oxymetazoline) nasal spray (four sprays into each nostril) and pinch the tip of the nose closed for 10 minutes. This can be bought over the counter. If you find you have done this four times in one hour and are still having bothersome bleeding, please call your doctor. Sometimes you will be given a bottle of afrin at the time of discharge.  This was used at the end of your surgery in your own nose, so you will find that the bottle is already opened, and may have some small streaks of blood on the tip of the bottle. Please do not be alarmed, as this was used on you, and you alone!  6. There are no activity restrictions after surgery, but please \"listen to your body\". If you feel like you are doing too much, please reduce your activity level. The one restriction I have is that you avoid excessive nose-blowing, as this can lead to nasal bleeding.   7. Please call your doctor for any headache not controlled with pain medication, severe nausea or vomiting, fevers >100.4, changes in mental status, or any other concerning symptoms you may identify.        [facility]  Home Care Following Anesthesia  For 24 hours after surgery:  Get plenty of rest.  A responsible adult must stay with you for at least 24 hours after you leave the surgery center.  Do not drive or use heavy equipment.  If you have " "weakness or tingling, don't drive or use heavy equipment until this feeling goes away.   Do not drink alcohol.   Avoid strenuous or risky activities.  Ask for help when climbing stairs.  You may feel lightheaded.  IF so, sit for a few minutes before standing.  Have someone help you get up.   If you have nausea (feel sick to your stomach): Drink only clear liquids such as apple juice, ginger ale, broth or 7-Up.  Rest may also help.  Be sure to drink enough fluids.  Move to a regular diet as you feel able.   You may have a slight fever.  Call the doctor if your fever is over 100 F (37.7 C) (taken under the tongue) or lasts longer than 24 hours.  You may have a dry mouth, a sore throat, muscle aches or trouble sleeping. These should go away after 24 hours.  Do not make important or legal decisions.   It is recommended to avoid smoking.        Today you received a Marcaine or bupivacaine block to numb the nerves near your surgery site.  This is a block using local anesthetic or \"numbing\" medication injected around the nerves to anesthetize or \"numb\" the area supplied by those nerves.  This block is injected into the muscle layer near your surgical site.  The medication may numb the location where you had surgery for 6-18 hours, but may last up to 24 hours.  If your surgical site is an arm or leg you should be careful with your affected limb, since it is possible to injure your limb without being aware of it due to the numbing.  Until full feeling returns, you should guard against bumping or hitting your limb, and avoid extreme hot or cold temperatures on the skin.  As the block wears off, the feeling will return as a tingling or prickly sensation near your surgical site.  You will experience more discomfort from your incision as the feeling returns.  You may want to take a pain pill (a narcotic or Tylenol if this was prescribed by your surgeon) when you start to experience mild pain before the pain beccomes more severe. "  If your pain medications do not control your pain you should notifiy your surgeon.    Tips for taking pain medications  To get the best pain relief possible, remember these points:  Take pain medications as directed, before pain becomes severe.  Pain medication can upset your stomach: taking it with food may help.  Constipation is a common side effect of pain medication. Drink plenty of  fluids.  Eat foods high in fiber. Take a stool softener if recommended by your doctor or pharmacist.  Do not drink alcohol, drive or operate machinery while taking pain medications.  Ask about other ways to control pain, such as with heat, ice or relaxation.    Tylenol/Acetaminophen Consumption    If you feel your pain relief is insufficient, you may take Tylenol/Acetaminophen in addition to your narcotic pain medication.   Be careful not to exceed 4,000 mg of Tylenol/Acetaminophen in a 24 hour period from all sources.  If you are taking extra strength Tylenol/acetaminophen (500 mg), the maximum dose is 8 tablets in 24 hours.  If you are taking regular strength acetaminophen (325 mg), the maximum dose is 12 tablets in 24 hours.    Call a doctor for any of the following:  Signs of infection (fever, growing tenderness at the surgery site, a large amount of drainage or bleeding, severe pain, foul-smelling drainage, redness, swelling).  It has been over 8 to 10 hours since surgery and you are still not able to urinate (pass water).  Headache for over 24 hours.  Numbness, tingling or weakness the day after surgery (if you had spinal anesthesia).  Signs of Covid-19 infection (temperature over 100 degrees, shortness of breath, cough, loss of taste/smell, generalized body aches, persistent headache, chills, sore throat, nausea/vomiting/diarrhea)  Your doctor is:       Dr. Arnulfo Rodriguez, ENT Otolaryngology: [phone]               Or dial [phone] and ask for the resident on call for:  ENT Otolaryngology  For emergency care, call the:   [facility]:  [phone] (TTY for hearing impaired: [phone])

## 2024-06-10 NOTE — ANESTHESIA PREPROCEDURE EVALUATION
Anesthesia Pre-Procedure Evaluation    Patient: Peace Boudreaux   MRN: [id number] : 1965        Procedure : Procedure(s):  endoscopic bilateral maxillary antrostomy, total ethmoidectomy, sphenoidotomy, frontal sinusotomy, and endo septoplasty          Past Medical History:   Diagnosis Date    ASCUS favor benign 2015    colp - neg    Congenital anomalies of other endocrine glands 2004    Cyst on thyroid    LSIL (low grade squamous intraepithelial lesion) on Pap smear 2014    neg high risk  HPV      Past Surgical History:   Procedure Laterality Date    OPERATIVE HYSTEROSCOPY WITH MORCELLATOR N/A 2018    Procedure: OPERATIVE HYSTEROSCOPY WITH MORCELLATOR ([employer]);  OPERATIVE HYSTEROSCOPY AND POLYPECTOMY WITH SUERO NEPHEW MORCELLATOR AND FLUID MANAGEMENT ;  Surgeon: Gerard Cole MD;  Location: [facility]    THYROID BIOPSY      [facility] EXCISE HAND/FOOT NEUROMA        Allergies   Allergen Reactions    No Known Drug Allergy       Social History     Tobacco Use    Smoking status: Never    Smokeless tobacco: Never   Substance Use Topics    Alcohol use: Yes     Comment: occ      Wt Readings from Last 1 Encounters:   24 82.1 kg (181 lb)        Anesthesia Evaluation   Pt has had prior anesthetic.     No history of anesthetic complications       ROS/MED HX  ENT/Pulmonary: Comment: Chronic pansinusitis      Neurologic:  - neg neurologic ROS     Cardiovascular:  - neg cardiovascular ROS     METS/Exercise Tolerance: >4 METS    Hematologic:  - neg hematologic  ROS     Musculoskeletal:  - neg musculoskeletal ROS     GI/Hepatic:  - neg GI/hepatic ROS     Renal/Genitourinary:  - neg Renal ROS     Endo:  - neg endo ROS     Psychiatric/Substance Use:  - neg psychiatric ROS     Infectious Disease:  - neg infectious disease ROS     Malignancy:  - neg malignancy ROS     Other:  - neg other ROS          Physical Exam    Airway  airway exam normal      Mallampati: I   TM distance: > 3 FB  "  Neck ROM: full   Mouth opening: > 3 cm    Respiratory Devices and Support         Dental       (+) Completely normal teeth      Cardiovascular   cardiovascular exam normal       Rhythm and rate: regular and normal     Pulmonary   pulmonary exam normal        breath sounds clear to auscultation           OUTSIDE LABS:  CBC:   Lab Results   Component Value Date    WBC 6.8 01/18/2024    WBC 6.1 08/29/2022    HGB 14.0 01/18/2024    HGB 13.1 08/29/2022    HCT 42.0 01/18/2024    HCT 38.9 08/29/2022     01/18/2024     08/29/2022     BMP:   Lab Results   Component Value Date     01/18/2024     08/29/2022    POTASSIUM 4.3 01/18/2024    POTASSIUM 4.3 08/29/2022    CHLORIDE 102 01/18/2024    CHLORIDE 106 08/29/2022    CO2 25 01/18/2024    CO2 25 08/29/2022    BUN 13.7 01/18/2024    BUN 13 08/29/2022    CR 0.73 01/18/2024    CR 0.70 08/29/2022     (H) 01/18/2024    GLC 98 08/29/2022     COAGS: No results found for: \"PTT\", \"INR\", \"FIBR\"  POC:   Lab Results   Component Value Date    HCG Negative 05/10/2011    HCGS Negative 06/29/2007     HEPATIC: No results found for: \"ALBUMIN\", \"PROTTOTAL\", \"ALT\", \"AST\", \"GGT\", \"ALKPHOS\", \"BILITOTAL\", \"BILIDIRECT\", \"INA\"  OTHER:   Lab Results   Component Value Date    BARRIE 9.7 01/18/2024    TSH 1.93 08/29/2022    T4 0.95 05/06/2005       Anesthesia Plan    ASA Status:  2    NPO Status:  NPO Appropriate    Anesthesia Type: MAC.     - Reason for MAC: Deep or markedly invasive procedure (G8)   Induction: Propofol.   Maintenance: N/A.        Consents    Anesthesia Plan(s) and associated risks, benefits, and realistic alternatives discussed. Questions answered and patient/representative(s) expressed understanding.     - Discussed:     - Discussed with:  Patient       Use of blood products discussed: No .     Postoperative Care    Pain management: Multi-modal analgesia, Oral pain medications.   PONV prophylaxis: Ondansetron (or other 5HT-3), Dexamethasone or " "Solumedrol     Comments:               Kuldip Ray, DO    I have reviewed the pertinent notes and labs in the chart from the past 30 days and (re)examined the patient.  Any updates or changes from those notes are reflected in this note.              # Obesity: Estimated body mass index is 30.12 kg/m  as calculated from the following:    Height as of 5/30/24: 1.651 m (5' 5\").    Weight as of 5/30/24: 82.1 kg (181 lb).      "

## 2024-06-10 NOTE — ANESTHESIA PROCEDURE NOTES
Airway       Patient location during procedure: OR       Procedure Start/Stop Times: 6/10/2024 11:06 AM  Staff -        Performed By: other anesthesia staffIndications and Patient Condition       Indications for airway management: carolynn-procedural       Induction type:intravenous       Mask difficulty assessment: 1 - vent by mask    Final Airway Details       Final airway type: endotracheal airway       Successful airway: ETT - single, Oral and PASCUAL  Endotracheal Airway Details        ETT size (mm): 7.0       Cuffed: yes       Successful intubation technique: direct laryngoscopy       DL Blade Type: MAC 3       Grade View of Cords: 1       Adjucts: stylet       Position: Left       Measured from: lips       Secured at (cm): 22       Bite block used: None    Post intubation assessment        Number of attempts at approach: 1       Number of other approaches attempted: 0       Secured with: tape       Ease of procedure: easy       Dentition: Intact and Unchanged    Medication(s) Administered   Medication Administration Time: 6/10/2024 11:06 AM    Additional Comments       Med student intubation under guidance of Dr Ray

## 2024-06-12 LAB — BACTERIA TISS BX CULT: NORMAL

## 2024-06-13 ENCOUNTER — TELEPHONE (OUTPATIENT)
Dept: OTOLARYNGOLOGY | Facility: CLINIC | Age: 59
End: 2024-06-13
Payer: COMMERCIAL

## 2024-06-13 LAB
PATH REPORT.COMMENTS IMP SPEC: NORMAL
PATH REPORT.COMMENTS IMP SPEC: NORMAL
PATH REPORT.FINAL DX SPEC: NORMAL
PATH REPORT.GROSS SPEC: NORMAL
PATH REPORT.MICROSCOPIC SPEC OTHER STN: NORMAL
PATH REPORT.RELEVANT HX SPEC: NORMAL
PHOTO IMAGE: NORMAL

## 2024-06-13 NOTE — TELEPHONE ENCOUNTER
"Writer called pt to check in after surgery. Pt stated that she \"thinks she is doing okay\". Pt reported facial pain and congestion, writer assured patient that these are normal symptoms after surgery. Pt stated she has been using the nasal sprays and sinus rinses consistently. Pt asked if she could blow her nose, writer stated that she should wait until the first post op. Pt reported numbness to the roof of her mouth since surgery, writer stated this is normal. Pt had no other concerns. Writer encouraged pt to reach out with any questions or concerns.    Debra German RN  "

## 2024-06-14 LAB
BACTERIA TISS BX CULT: ABNORMAL
BACTERIA TISS BX CULT: ABNORMAL

## 2024-06-17 ENCOUNTER — OFFICE VISIT (OUTPATIENT)
Dept: OTOLARYNGOLOGY | Facility: CLINIC | Age: 59
End: 2024-06-17
Payer: COMMERCIAL

## 2024-06-17 VITALS
SYSTOLIC BLOOD PRESSURE: 138 MMHG | DIASTOLIC BLOOD PRESSURE: 80 MMHG | BODY MASS INDEX: 29.66 KG/M2 | WEIGHT: 178 LBS | HEIGHT: 65 IN | HEART RATE: 71 BPM

## 2024-06-17 DIAGNOSIS — Z98.890: ICD-10-CM

## 2024-06-17 DIAGNOSIS — J32.4 CHRONIC PANSINUSITIS: Primary | ICD-10-CM

## 2024-06-17 PROCEDURE — 31237 NSL/SINS NDSC SURG BX POLYPC: CPT | Mod: 50 | Performed by: STUDENT IN AN ORGANIZED HEALTH CARE EDUCATION/TRAINING PROGRAM

## 2024-06-17 NOTE — PROGRESS NOTES
[facility]  Return Visit  Encounter date:   May 30, 2024    Chief Complaint:   Follow up    History of Present Illness:      Peace Boudreaux is a 58 year old female who presents for consultation regarding chronic maxillary sinusitis. Patient endorses nasal congestion. Sinus pain/pressure, and productive cough with green discharge. Her symptoms started after john COVID in December of last year. Has been placed on Azithromycin in the past.  She has also had no significant improvements with Flonase, Zyrtec, anti H1, Z-pack, and Navage. She has been advised to take Sudafed occasionally. Has not had a sinus infection prior to this recent episode. Denies history of allergies. Does have albuterol inhaler at home.      Feels that her medications are not effective. Has been on them for greater than 6 weeks.     Interval History:   Peace Boudreaux is a 59 year old female who presents for first follow-up from [facility] on 6/10/24. Doing well. Congested and with some pain of the front teeth. Otherwise no epistaxis.       Physical Exam:    General Appearance: No acute distress, appropriate demeanor, conversant  Eyes: moist conjunctivae; EOMI; pupils symmetric; visual acuity grossly intact; no proptosis  Head: normocephalic; overall symmetric appearance without deformity  Face: overall symmetric without deformity  Ears: Normal appearance of external ear  Nose: No external deformity  Oral Cavity/oropharynx: Normal appearance of mucosa  Neck: no palpable lymphadenopathy; thyroid without palpable nodules  Lungs: symmetric chest rise; no wheezing  CV: Good distal perfusion; normal hear rate  Extremities: No deformity  Neurologic Exam: Cranial nerves II-XII are grossly intact      Procedure Note  Procedure performed: Rigid nasal endoscopy  Indication: To evaluate for sinonasal pathology not visualized on routine anterior rhinoscopy  Anesthesia: 4% topical lidocaine with 0.05 % oxymetazoline  Description of  procedure: A 30 degree, 3 mm rigid endoscope was inserted into bilateral nasal cavities and the nasal valves, nasal cavity, middle meatus, sphenoethmoid recess, nasopharynx were evaluated for evidence of obstruction, edema, purulence, polyps and/or mass/lesion.         Findings  RT: crusting and clot removed from nasal cavity and opened sinuses. Debrided with straight and curved suction and straight forceps   LT: crusting and clot removed from nasal cavity and opened sinuses. Debrided with straight and curved suction and straight forceps    Debridement is indicated for improved healing post-operatively      Pathology Review:  BILATERAL SINUS CONTENT, EXCISION:  -Benign sinonasal mucosa with chronic inflammation and patchy increased eosinophilic infiltrate.    Assessment/Medical Decision Makin year old female now post-op from bilateral FESS. Overall doing well ok to lift restrictions. Will see me for 2nd follow-up in a few weeks          Electronically signed by:  Arnulfo Rodriguez MD    [facility]  Rhinology, Endoscopic Skull Base Surgery  [facility]  Department of Otolaryngology - Head & Neck Surgery

## 2024-06-17 NOTE — LETTER
6/17/2024       RE: Peace Boudreaux  [address]  [place] [address]     Dear Colleague,    Thank you for referring your patient, Peace Boudreaux, to the [facility] [place] [facility]. Please see a copy of my visit note below.      [facility]  Return Visit  Encounter date:   May 30, 2024    Chief Complaint:   Follow up    History of Present Illness:      Peace Boudreaux is a 58 year old female who presents for consultation regarding chronic maxillary sinusitis. Patient endorses nasal congestion. Sinus pain/pressure, and productive cough with green discharge. Her symptoms started after john COVID in December of last year. Has been placed on Azithromycin in the past.  She has also had no significant improvements with Flonase, Zyrtec, anti H1, Z-pack, and Navage. She has been advised to take Sudafed occasionally. Has not had a sinus infection prior to this recent episode. Denies history of allergies. Does have albuterol inhaler at home.      Feels that her medications are not effective. Has been on them for greater than 6 weeks.     Interval History:   Peace Boudreaux is a 59 year old female who presents for first follow-up from [facility] on 6/10/24. Doing well. Congested and with some pain of the front teeth. Otherwise no epistaxis.       Physical Exam:    General Appearance: No acute distress, appropriate demeanor, conversant  Eyes: moist conjunctivae; EOMI; pupils symmetric; visual acuity grossly intact; no proptosis  Head: normocephalic; overall symmetric appearance without deformity  Face: overall symmetric without deformity  Ears: Normal appearance of external ear  Nose: No external deformity  Oral Cavity/oropharynx: Normal appearance of mucosa  Neck: no palpable lymphadenopathy; thyroid without palpable nodules  Lungs: symmetric chest rise; no wheezing  CV: Good distal perfusion; normal  hear rate  Extremities: No deformity  Neurologic Exam: Cranial nerves II-XII are grossly intact      Procedure Note  Procedure performed: Rigid nasal endoscopy  Indication: To evaluate for sinonasal pathology not visualized on routine anterior rhinoscopy  Anesthesia: 4% topical lidocaine with 0.05 % oxymetazoline  Description of procedure: A 30 degree, 3 mm rigid endoscope was inserted into bilateral nasal cavities and the nasal valves, nasal cavity, middle meatus, sphenoethmoid recess, nasopharynx were evaluated for evidence of obstruction, edema, purulence, polyps and/or mass/lesion.         Findings  RT: crusting and clot removed from nasal cavity and opened sinuses. Debrided with straight and curved suction and straight forceps   LT: crusting and clot removed from nasal cavity and opened sinuses. Debrided with straight and curved suction and straight forceps    Debridement is indicated for improved healing post-operatively      Pathology Review:  BILATERAL SINUS CONTENT, EXCISION:  -Benign sinonasal mucosa with chronic inflammation and patchy increased eosinophilic infiltrate.    Assessment/Medical Decision Makin year old female now post-op from bilateral FESS. Overall doing well ok to lift restrictions. Will see me for 2nd follow-up in a few weeks          Electronically signed by:  Arnulfo Rodriguez MD    [facility]  Rhinology, Endoscopic Skull Base Surgery  [facility]  Department of Otolaryngology - Head & Neck Surgery

## 2024-06-28 ENCOUNTER — MYC MEDICAL ADVICE (OUTPATIENT)
Dept: OTOLARYNGOLOGY | Facility: CLINIC | Age: 59
End: 2024-06-28
Payer: COMMERCIAL

## 2024-07-10 ENCOUNTER — OFFICE VISIT (OUTPATIENT)
Dept: OTOLARYNGOLOGY | Facility: CLINIC | Age: 59
End: 2024-07-10
Payer: COMMERCIAL

## 2024-07-10 VITALS
OXYGEN SATURATION: 98 % | BODY MASS INDEX: 29.66 KG/M2 | DIASTOLIC BLOOD PRESSURE: 81 MMHG | HEIGHT: 65 IN | WEIGHT: 178 LBS | SYSTOLIC BLOOD PRESSURE: 115 MMHG | HEART RATE: 89 BPM

## 2024-07-10 DIAGNOSIS — J32.4 CHRONIC PANSINUSITIS: Primary | ICD-10-CM

## 2024-07-10 PROCEDURE — 31231 NASAL ENDOSCOPY DX: CPT | Mod: 79 | Performed by: STUDENT IN AN ORGANIZED HEALTH CARE EDUCATION/TRAINING PROGRAM

## 2024-07-10 RX ORDER — PREDNISONE 10 MG/1
30 TABLET ORAL DAILY
Qty: 21 TABLET | Refills: 0 | Status: SHIPPED | OUTPATIENT
Start: 2024-07-10 | End: 2024-07-17

## 2024-07-10 ASSESSMENT — PAIN SCALES - GENERAL: PAINLEVEL: NO PAIN (0)

## 2024-07-10 NOTE — NURSING NOTE
"Chief Complaint   Patient presents with    RECHECK   Blood pressure 115/81, pulse 89, height 1.651 m (5' 5\"), weight 80.7 kg (178 lb), last menstrual period 05/08/2012, SpO2 98%, not currently breastfeeding. Drake Aly, EMT    "

## 2024-07-10 NOTE — PROGRESS NOTES
[facility]  Return Visit  Encounter date:   7/10/24    Chief Complaint:   Follow up    History of Present Illness:      Peace Boudreaux is a 58 year old female who presents for consultation regarding chronic maxillary sinusitis. Patient endorses nasal congestion. Sinus pain/pressure, and productive cough with green discharge. Her symptoms started after john COVID in December of last year. Has been placed on Azithromycin in the past.  She has also had no significant improvements with Flonase, Zyrtec, anti H1, Z-pack, and Navage. She has been advised to take Sudafed occasionally. Has not had a sinus infection prior to this recent episode. Denies history of allergies. Does have albuterol inhaler at home.      Feels that her medications are not effective. Has been on them for greater than 6 weeks.     Interval History:   Peace Boudreaux is a 59 year old female who presents [facility]. Has had some return of drainage and increase in morning headaches. Has noticed rancid smell     Physical Exam:    General Appearance: No acute distress, appropriate demeanor, conversant  Eyes: moist conjunctivae; EOMI; pupils symmetric; visual acuity grossly intact; no proptosis  Head: normocephalic; overall symmetric appearance without deformity  Face: overall symmetric without deformity  Ears: Normal appearance of external ear  Nose: No external deformity  Oral Cavity/oropharynx: Normal appearance of mucosa  Neck: no palpable lymphadenopathy; thyroid without palpable nodules  Lungs: symmetric chest rise; no wheezing  CV: Good distal perfusion; normal hear rate  Extremities: No deformity  Neurologic Exam: Cranial nerves II-XII are grossly intact      Procedure Note  Procedure performed: Rigid nasal endoscopy  Indication: To evaluate for sinonasal pathology not visualized on routine anterior rhinoscopy  Anesthesia: 4% topical lidocaine with 0.05 % oxymetazoline  Description of  procedure: A 30 degree, 3 mm rigid endoscope was inserted into bilateral nasal cavities and the nasal valves, nasal cavity, middle meatus, sphenoethmoid recess, nasopharynx were evaluated for evidence of obstruction, edema, purulence, polyps and/or mass/lesion.         Findings  RT: Crusting removed, swelling around the middle turbinate and maxillary sinus  LT: Crusting around the ethmoids removed, small amount of purulence.     Debridement is indicated for improved healing post-operatively      Pathology Review:  BILATERAL SINUS CONTENT, EXCISION:  -Benign sinonasal mucosa with chronic inflammation and patchy increased eosinophilic infiltrate.    Assessment/Medical Decision Makin year old female now post-op from bilateral FESS. Endoscopy demonstrates crusting on the left and swelling around the maxillary sinus on the right. Will start one week of steroids and antibiotics. If continued issues will do CT scan due to some concern of scarring on the right.         Electronically signed by:  Arnulfo Rodriguez MD    [facility]  Rhinology, Endoscopic Skull Base Surgery  [facility]  Department of Otolaryngology - Head & Neck Surgery

## 2024-07-10 NOTE — PATIENT INSTRUCTIONS
You were seen in the ENT Clinic today by Dr. Rodriguez. If you have any questions or concerns after your appointment, please contact us (see below)       2.   The following recommendations have been made based upon your appointment today:  We have sent 2 prescriptions to your pharmacy:   Augmentin: One tablet by mouth twice daily for 7 days.   Prednisone: Three tablets (30 mg) by mouth for 7 days.      3.   Plan to return to the ENT clinic in 2 months.           How to Contact Us:  Send a [employer] message to your provider. Our team will respond to you via [employer]. Occasionally, we will need to call you to get further information.  For urgent matters (Monday-Friday), call the ENT Clinic: [phone] and speak with a call center team member - they will route your call appropriately.   If you'd like to speak directly with a nurse, please find our contact information below. We do our best to check voicemail frequently throughout the day, and will work to call you back within 1-2 days. For urgent matters, please use the general clinic phone numbers listed above.     Debra OCONNOR RN  Direct: [phone]  Alice GREEN LPN  Direct: [phone]         [facility]  Department of Otolaryngology

## 2024-07-10 NOTE — LETTER
7/10/2024       RE: Peace Boudreaux  [address]  [place] [address]     Dear Colleague,    Thank you for referring your patient, Peace Boudreaux, to the [facility] EAR NOSE AND THROAT CLINIC [place] at [facility]. Please see a copy of my visit note below.      [facility]  Return Visit  Encounter date:   7/10/24    Chief Complaint:   Follow up    History of Present Illness:      Peace Boudreaux is a 58 year old female who presents for consultation regarding chronic maxillary sinusitis. Patient endorses nasal congestion. Sinus pain/pressure, and productive cough with green discharge. Her symptoms started after john COVID in December of last year. Has been placed on Azithromycin in the past.  She has also had no significant improvements with Flonase, Zyrtec, anti H1, Z-pack, and Navage. She has been advised to take Sudafed occasionally. Has not had a sinus infection prior to this recent episode. Denies history of allergies. Does have albuterol inhaler at home.      Feels that her medications are not effective. Has been on them for greater than 6 weeks.     Interval History:   Peace Boudreaux is a 59 year old female who presents for second follow-up from [facility] on 6/10/24. Has had some return of drainage and increase in morning headaches. Has noticed rancid smell     Physical Exam:    General Appearance: No acute distress, appropriate demeanor, conversant  Eyes: moist conjunctivae; EOMI; pupils symmetric; visual acuity grossly intact; no proptosis  Head: normocephalic; overall symmetric appearance without deformity  Face: overall symmetric without deformity  Ears: Normal appearance of external ear  Nose: No external deformity  Oral Cavity/oropharynx: Normal appearance of mucosa  Neck: no palpable lymphadenopathy; thyroid without palpable nodules  Lungs: symmetric chest rise; no wheezing  CV: Good distal perfusion; normal  hear rate  Extremities: No deformity  Neurologic Exam: Cranial nerves II-XII are grossly intact      Procedure Note  Procedure performed: Rigid nasal endoscopy  Indication: To evaluate for sinonasal pathology not visualized on routine anterior rhinoscopy  Anesthesia: 4% topical lidocaine with 0.05 % oxymetazoline  Description of procedure: A 30 degree, 3 mm rigid endoscope was inserted into bilateral nasal cavities and the nasal valves, nasal cavity, middle meatus, sphenoethmoid recess, nasopharynx were evaluated for evidence of obstruction, edema, purulence, polyps and/or mass/lesion.         Findings  RT: Crusting removed, swelling around the middle turbinate and maxillary sinus  LT: Crusting around the ethmoids removed, small amount of purulence.     Debridement is indicated for improved healing post-operatively      Pathology Review:  BILATERAL SINUS CONTENT, EXCISION:  -Benign sinonasal mucosa with chronic inflammation and patchy increased eosinophilic infiltrate.    Assessment/Medical Decision Makin year old female now post-op from bilateral FESS. Endoscopy demonstrates crusting on the left and swelling around the maxillary sinus on the right. Will start one week of steroids and antibiotics. If continued issues will do CT scan due to some concern of scarring on the right.         Electronically signed by:  Arnulfo Rodriguez MD    [facility]  Rhinology, Endoscopic Skull Base Surgery  [facility]  Department of Otolaryngology - Head & Neck Surgery

## 2024-08-08 ENCOUNTER — MYC MEDICAL ADVICE (OUTPATIENT)
Dept: OTOLARYNGOLOGY | Facility: CLINIC | Age: 59
End: 2024-08-08
Payer: COMMERCIAL

## 2024-08-08 DIAGNOSIS — J32.4 CHRONIC PANSINUSITIS: Primary | ICD-10-CM

## 2024-08-09 ENCOUNTER — LAB (OUTPATIENT)
Dept: LAB | Facility: CLINIC | Age: 59
End: 2024-08-09
Payer: COMMERCIAL

## 2024-08-09 DIAGNOSIS — E78.5 HYPERLIPIDEMIA LDL GOAL <100: ICD-10-CM

## 2024-08-09 LAB
CHOLEST SERPL-MCNC: 266 MG/DL
FASTING STATUS PATIENT QL REPORTED: YES
HDLC SERPL-MCNC: 69 MG/DL
LDLC SERPL CALC-MCNC: 177 MG/DL
NONHDLC SERPL-MCNC: 197 MG/DL
TRIGL SERPL-MCNC: 99 MG/DL

## 2024-08-09 PROCEDURE — 80061 LIPID PANEL: CPT

## 2024-08-09 PROCEDURE — 36415 COLL VENOUS BLD VENIPUNCTURE: CPT

## 2024-08-20 ENCOUNTER — HOSPITAL ENCOUNTER (OUTPATIENT)
Dept: CT IMAGING | Facility: CLINIC | Age: 59
Discharge: HOME | End: 2024-08-20
Attending: STUDENT IN AN ORGANIZED HEALTH CARE EDUCATION/TRAINING PROGRAM | Admitting: STUDENT IN AN ORGANIZED HEALTH CARE EDUCATION/TRAINING PROGRAM
Payer: COMMERCIAL

## 2024-08-20 DIAGNOSIS — E78.5 HYPERLIPIDEMIA LDL GOAL <100: Primary | ICD-10-CM

## 2024-08-20 DIAGNOSIS — J32.4 CHRONIC PANSINUSITIS: ICD-10-CM

## 2024-08-20 PROCEDURE — 70486 CT MAXILLOFACIAL W/O DYE: CPT | Mod: 26 | Performed by: RADIOLOGY

## 2024-08-20 PROCEDURE — 70486 CT MAXILLOFACIAL W/O DYE: CPT

## 2024-08-20 NOTE — PROGRESS NOTES
Date: 8/20/2024    Time of Call: 3:04 PM     Diagnosis:  hyperlipidemia     [ VORB ] Ordering provider: Elsi perrin NP    Order: referral to lipid clinic for hyperlipidemia, statin intolerance     Order received by: Jessica Kowalski RN      Follow-up/additional notes: order placed. Sent to pt via [employer].

## 2024-08-21 ENCOUNTER — OFFICE VISIT (OUTPATIENT)
Dept: OTOLARYNGOLOGY | Facility: CLINIC | Age: 59
End: 2024-08-21
Attending: STUDENT IN AN ORGANIZED HEALTH CARE EDUCATION/TRAINING PROGRAM
Payer: COMMERCIAL

## 2024-08-21 VITALS
BODY MASS INDEX: 29.62 KG/M2 | HEIGHT: 65 IN | OXYGEN SATURATION: 95 % | HEART RATE: 93 BPM | DIASTOLIC BLOOD PRESSURE: 85 MMHG | SYSTOLIC BLOOD PRESSURE: 126 MMHG

## 2024-08-21 DIAGNOSIS — J32.4 CHRONIC PANSINUSITIS: ICD-10-CM

## 2024-08-21 PROCEDURE — 31231 NASAL ENDOSCOPY DX: CPT | Mod: 79 | Performed by: STUDENT IN AN ORGANIZED HEALTH CARE EDUCATION/TRAINING PROGRAM

## 2024-08-21 RX ORDER — MUPIROCIN 20 MG/G
OINTMENT TOPICAL
Qty: 30 G | Refills: 1 | Status: SHIPPED | OUTPATIENT
Start: 2024-08-21

## 2024-08-21 RX ORDER — BUDESONIDE 0.5 MG/2ML
INHALANT ORAL
Qty: 120 ML | Refills: 3 | Status: SHIPPED | OUTPATIENT
Start: 2024-08-21 | End: 2024-09-26

## 2024-08-21 RX ORDER — PREDNISONE 10 MG/1
TABLET ORAL
Qty: 89 TABLET | Refills: 0 | Status: SHIPPED | OUTPATIENT
Start: 2024-08-21 | End: 2024-09-18

## 2024-08-21 ASSESSMENT — PAIN SCALES - GENERAL: PAINLEVEL: NO PAIN (0)

## 2024-08-21 NOTE — PATIENT INSTRUCTIONS
You were seen in the ENT Clinic today by Dr. Rodriguez. If you have any questions or concerns after your appointment, please contact us (see below)       2.   The following recommendations have been made based upon your appointment today:   -Start budesonide rinses twice daily. We have included additional instructions below regarding this. You will also add 1 inch of the mupirocin ointment to these rinses at the same time.   -Prednisone: 60 mg by mouth for 7 days, 40 mg by mouth for 7 days, 20 mg by mouth for 7 days, 10 mg by mouth for 3 days, 5 mg for 4 days, then stop.      3.   Plan to return to the ENT clinic in 5 weeks.           How to Contact Us:  Send a [employer] message to your provider. Our team will respond to you via [employer]. Occasionally, we will need to call you to get further information.  For urgent matters (Monday-Friday), call the ENT Clinic: [phone] and speak with a call center team member - they will route your call appropriately.   If you'd like to speak directly with a nurse, please find our contact information below. We do our best to check voicemail frequently throughout the day, and will work to call you back within 1-2 days. For urgent matters, please use the general clinic phone numbers listed above.     Debra OCONNOR RN  Direct: [phone]  Alice GREEN LPN  Direct: [phone]         [facility]  Department of Otolaryngology         Cleaning of the Nasal or Sinus Cavity    Budesonide (Pulmicort)  -Budesonide is an anti-inflammatory steroid medication used to decrease nasal and sinus inflammation.   -It is dispensed in liquid form in a 2 mL respules (small plastic pouch).   -Although it is manufactured for use with a nebulizer, we intend for you to use it with the NeilMed Sinus Rinse bottle (preferred).   -This medication is filled at your preferred pharmacy for you.      Please follow all steps. Nasal irrigation (cleaning) should be done 2 times/day.    Preparation:  1.  Wash your  hands  2.  We suggest that you buy the NeilMed Sinus Rinse Kit  3.  Use distilled water or tap water that has been boiled and brought to room temperature. This is important because serious infections can result from using tap water that is not clean. These infections are very rare, but it's better to be absolutely safe.  4.  Fill the irrigation bottle with room temperature water (distilled or boiled tap water) and add mixture (pre made packet or homemade recipe).        If you wish to make a homemade recipe:        Mix 1/4 teaspoon salt (kosher,non-iodine salt) with 1/4 teaspoon baking soda in each bottle.  5. Add one respule of budesonide. Mix well to ensure that everything is dissolved.  Cleansing Irrigation/Sinus Rinse:    1.  Lean forward over a sink and insert the rinse bottle applicator into the right side of your nose. Make sure to point the applicator towards the back of your head.     2.  Tilt head down and to the left side.  With your mouth open (breathing through your mouth), gently direct the water around the inside of your nose until clear fluid starts to drain from the opposite nostril.  This is called flushing or irrigation.    3.  When you have used 1/4 to 1/2 or the solution, switch to the left nostril.    4.  To irrigate the left nostril, tilt your head down and to the right side.  With your mouth open (breathing through your mouth),  gently direct the water around the inside of your nose until clear fluid starts to drain from the opposite nostril.     Cleaning the Equipment:  1.  Throw away any leftover solution  2.  Clean the rinse bottle and cap with clear water. Air dry.      Call the ENT Clinic if you have any questions or concerns at [phone]

## 2024-08-21 NOTE — LETTER
8/21/2024       RE: Peace Boudreaux  [address]  [place] [address]     Dear Colleague,    Thank you for referring your patient, Peace Boudreaux, to the [facility] EAR NOSE AND THROAT CLINIC [place] at [facility]. Please see a copy of my visit note below.      [facility]  Return Visit  Encounter date:   8/21/24    Chief Complaint:   Follow up    History of Present Illness:      Peace Boudreaux is a 58 year old female who presents for consultation regarding chronic maxillary sinusitis. Patient endorses nasal congestion. Sinus pain/pressure, and productive cough with green discharge. Her symptoms started after john COVID in December of last year. Has been placed on Azithromycin in the past.  She has also had no significant improvements with Flonase, Zyrtec, anti H1, Z-pack, and Navage. She has been advised to take Sudafed occasionally. Has not had a sinus infection prior to this recent episode. Denies history of allergies. Does have albuterol inhaler at home.      Feels that her medications are not effective. Has been on them for greater than 6 weeks.     Interval History:   Peace Boudreaux is a 59 year old female who presents for second follow-up from [facility] on 6/10/24. Has had some return of drainage and increase in morning headaches. Has noticed rancid smell     Interval Hx 8/21/24:  Returns for third visit. Still with significant drainage and rancid smell. CT demonstrates significant inflammation.       Physical Exam:    General Appearance: No acute distress, appropriate demeanor, conversant  Eyes: moist conjunctivae; EOMI; pupils symmetric; visual acuity grossly intact; no proptosis  Head: normocephalic; overall symmetric appearance without deformity  Face: overall symmetric without deformity  Ears: Normal appearance of external ear  Nose: No external deformity  Oral Cavity/oropharynx: Normal appearance of  mucosa  Neck: no palpable lymphadenopathy; thyroid without palpable nodules  Lungs: symmetric chest rise; no wheezing  CV: Good distal perfusion; normal hear rate  Extremities: No deformity  Neurologic Exam: Cranial nerves II-XII are grossly intact      Procedure Note  Procedure performed: Rigid nasal endoscopy  Indication: To evaluate for sinonasal pathology not visualized on routine anterior rhinoscopy  Anesthesia: 4% topical lidocaine with 0.05 % oxymetazoline  Description of procedure: A 30 degree, 3 mm rigid endoscope was inserted into bilateral nasal cavities and the nasal valves, nasal cavity, middle meatus, sphenoethmoid recess, nasopharynx were evaluated for evidence of obstruction, edema, purulence, polyps and/or mass/lesion.         Findings  RT: No crusting but significant inflammation of the maxillary sinus and frontal sinus. Middle turbinate inflamed, possibly scarred to the middle meatus    LT: Crusting around the ethmoids removed, small amount of purulence. Maxillary sinus open without infection. Frontal sinuses with ongoing polypoid edema.     Debridement is indicated for improved healing post-operatively      Pathology Review:  BILATERAL SINUS CONTENT, EXCISION:  -Benign sinonasal mucosa with chronic inflammation and patchy increased eosinophilic infiltrate.    Assessment/Medical Decision Makin year old female now post-op from bilateral FESS. Endoscopy demonstrates persistent inflammation bilaterally.    Patient appears to essentially be in a chronic sinusitis flair without significant improvement since I last saw her.    Discussed going on high dose steroid taper to try to get this under control. Will also do budesonide rinses with mupirocin     Follow-up with me in one month         Electronically signed by:  Arnulfo Rodriguez MD    [facility]  Rhinology, Endoscopic Skull Base Surgery  [facility]  Department of Otolaryngology - Head & Neck  Surgery      Again, thank you for allowing me to participate in the care of your patient.      Sincerely,    Arnulfo Rodriguez MD

## 2024-08-21 NOTE — PROGRESS NOTES
[facility]  Return Visit  Encounter date:   8/21/24    Chief Complaint:   Follow up    History of Present Illness:      Peace Boudreaux is a 58 year old female who presents for consultation regarding chronic maxillary sinusitis. Patient endorses nasal congestion. Sinus pain/pressure, and productive cough with green discharge. Her symptoms started after john COVID in December of last year. Has been placed on Azithromycin in the past.  She has also had no significant improvements with Flonase, Zyrtec, anti H1, Z-pack, and Navage. She has been advised to take Sudafed occasionally. Has not had a sinus infection prior to this recent episode. Denies history of allergies. Does have albuterol inhaler at home.      Feels that her medications are not effective. Has been on them for greater than 6 weeks.     Interval History:   Peace Boudreaux is a 59 year old female who presents for second follow-up from [facility] on 6/10/24. Has had some return of drainage and increase in morning headaches. Has noticed rancid smell     Interval Hx 8/21/24:  Returns for third visit. Still with significant drainage and rancid smell. CT demonstrates significant inflammation.       Physical Exam:    General Appearance: No acute distress, appropriate demeanor, conversant  Eyes: moist conjunctivae; EOMI; pupils symmetric; visual acuity grossly intact; no proptosis  Head: normocephalic; overall symmetric appearance without deformity  Face: overall symmetric without deformity  Ears: Normal appearance of external ear  Nose: No external deformity  Oral Cavity/oropharynx: Normal appearance of mucosa  Neck: no palpable lymphadenopathy; thyroid without palpable nodules  Lungs: symmetric chest rise; no wheezing  CV: Good distal perfusion; normal hear rate  Extremities: No deformity  Neurologic Exam: Cranial nerves II-XII are grossly intact      Procedure Note  Procedure performed: Rigid nasal endoscopy  Indication: To evaluate for  sinonasal pathology not visualized on routine anterior rhinoscopy  Anesthesia: 4% topical lidocaine with 0.05 % oxymetazoline  Description of procedure: A 30 degree, 3 mm rigid endoscope was inserted into bilateral nasal cavities and the nasal valves, nasal cavity, middle meatus, sphenoethmoid recess, nasopharynx were evaluated for evidence of obstruction, edema, purulence, polyps and/or mass/lesion.         Findings  RT: No crusting but significant inflammation of the maxillary sinus and frontal sinus. Middle turbinate inflamed, possibly scarred to the middle meatus    LT: Crusting around the ethmoids removed, small amount of purulence. Maxillary sinus open without infection. Frontal sinuses with ongoing polypoid edema.     Debridement is indicated for improved healing post-operatively      Pathology Review:  BILATERAL SINUS CONTENT, EXCISION:  -Benign sinonasal mucosa with chronic inflammation and patchy increased eosinophilic infiltrate.    Assessment/Medical Decision Makin year old female now post-op from bilateral FESS. Endoscopy demonstrates persistent inflammation bilaterally.    Patient appears to essentially be in a chronic sinusitis flair without significant improvement since I last saw her.    Discussed going on high dose steroid taper to try to get this under control. Will also do budesonide rinses with mupirocin     Follow-up with me in one month         Electronically signed by:  Arnulfo Rodriguez MD    [facility]  Rhinology, Endoscopic Skull Base Surgery  [facility]  Department of Otolaryngology - Head & Neck Surgery

## 2024-08-21 NOTE — NURSING NOTE
"Chief Complaint   Patient presents with    RECHECK   Blood pressure 126/85, pulse 93, height 1.651 m (5' 5\"), last menstrual period 05/08/2012, SpO2 95%, not currently breastfeeding. Drake Aly, EMT    "

## 2024-08-22 ENCOUNTER — TELEPHONE (OUTPATIENT)
Dept: OTOLARYNGOLOGY | Facility: CLINIC | Age: 59
End: 2024-08-22
Payer: COMMERCIAL

## 2024-09-26 ENCOUNTER — OFFICE VISIT (OUTPATIENT)
Dept: OTOLARYNGOLOGY | Facility: CLINIC | Age: 59
End: 2024-09-26
Attending: STUDENT IN AN ORGANIZED HEALTH CARE EDUCATION/TRAINING PROGRAM
Payer: COMMERCIAL

## 2024-09-26 ENCOUNTER — PREP FOR PROCEDURE (OUTPATIENT)
Dept: OTOLARYNGOLOGY | Facility: CLINIC | Age: 59
End: 2024-09-26

## 2024-09-26 VITALS
SYSTOLIC BLOOD PRESSURE: 135 MMHG | DIASTOLIC BLOOD PRESSURE: 79 MMHG | HEIGHT: 65 IN | HEART RATE: 107 BPM | BODY MASS INDEX: 29.62 KG/M2 | OXYGEN SATURATION: 95 %

## 2024-09-26 DIAGNOSIS — J32.0 CHRONIC MAXILLARY SINUSITIS: ICD-10-CM

## 2024-09-26 DIAGNOSIS — J32.4 CHRONIC PANSINUSITIS: ICD-10-CM

## 2024-09-26 DIAGNOSIS — Z98.890: Primary | ICD-10-CM

## 2024-09-26 DIAGNOSIS — J32.4 CHRONIC PANSINUSITIS: Primary | ICD-10-CM

## 2024-09-26 PROCEDURE — 31231 NASAL ENDOSCOPY DX: CPT | Performed by: STUDENT IN AN ORGANIZED HEALTH CARE EDUCATION/TRAINING PROGRAM

## 2024-09-26 PROCEDURE — 99212 OFFICE O/P EST SF 10 MIN: CPT | Mod: 25 | Performed by: STUDENT IN AN ORGANIZED HEALTH CARE EDUCATION/TRAINING PROGRAM

## 2024-09-26 PROCEDURE — 99000 SPECIMEN HANDLING OFFICE-LAB: CPT | Performed by: PATHOLOGY

## 2024-09-26 PROCEDURE — 87070 CULTURE OTHR SPECIMN AEROBIC: CPT | Performed by: STUDENT IN AN ORGANIZED HEALTH CARE EDUCATION/TRAINING PROGRAM

## 2024-09-26 PROCEDURE — 87075 CULTR BACTERIA EXCEPT BLOOD: CPT | Performed by: STUDENT IN AN ORGANIZED HEALTH CARE EDUCATION/TRAINING PROGRAM

## 2024-09-26 RX ORDER — BUDESONIDE 0.5 MG/2ML
INHALANT ORAL
Qty: 120 ML | Refills: 0 | Status: SHIPPED | OUTPATIENT
Start: 2024-09-26

## 2024-09-26 RX ORDER — DEXAMETHASONE SODIUM PHOSPHATE 4 MG/ML
10 INJECTION, SOLUTION INTRA-ARTICULAR; INTRALESIONAL; INTRAMUSCULAR; INTRAVENOUS; SOFT TISSUE ONCE
OUTPATIENT
Start: 2024-09-26 | End: 2024-09-26

## 2024-09-26 RX ORDER — CEFAZOLIN SODIUM 2 G/50ML
2 SOLUTION INTRAVENOUS
OUTPATIENT
Start: 2024-09-26

## 2024-09-26 RX ORDER — CEFAZOLIN SODIUM 2 G/50ML
2 SOLUTION INTRAVENOUS SEE ADMIN INSTRUCTIONS
OUTPATIENT
Start: 2024-09-26

## 2024-09-26 ASSESSMENT — PAIN SCALES - GENERAL: PAINLEVEL: NO PAIN (0)

## 2024-09-26 NOTE — PATIENT INSTRUCTIONS
You were seen in the ENT Clinic today by Dr. Rodriguez. If you have any questions or concerns after your appointment, please contact us (see below)       2.   The following recommendations have been made based upon your appointment today:   -We have sent a culture of your sinuses to the lab. We will contact you with results and a plan of care.   -A prescription has been sent for Xhance. This prescription comes from a specialty pharmacy. Let us know if you don't hear from someone regarding getting it filled.   -We will place the order for revision surgery. Our schedulers will reach out and you can schedule at your convenience.       3.   Plan to return to the ENT clinic after surgery.           How to Contact Us:  Send a [employer] message to your provider. Our team will respond to you via [employer]. Occasionally, we will need to call you to get further information.  For urgent matters (Monday-Friday), call the ENT Clinic: [phone] and speak with a call center team member - they will route your call appropriately.   If you'd like to speak directly with a nurse, please find our contact information below. We do our best to check voicemail frequently throughout the day, and will work to call you back within 1-2 days. For urgent matters, please use the general clinic phone numbers listed above.     Debar OCONNOR RN  Direct: [phone]  Alice GREEN LPN  Direct: [phone]         [facility]  Department of Otolaryngology

## 2024-09-26 NOTE — PROGRESS NOTES
[facility]  Return Visit  Encounter date:   September 26, 2024    Chief Complaint:   Chronic maxillary sinusitis     ID:Chronic pansinusitis     Interval History:   Peace Boudreaux is a 59 year old female who presents for [facility] on [facility]. Has had some return of drainage and increase in morning headaches. Has noticed rancid smell  Interval Hx 8/21/24:  Returns for third visit. Still with significant drainage and rancid smell. CT demonstrates significant inflammation.     Interval History:   Peace Boudreaux is a 59 year old female who presents for follow up Chronic pansinusitis. Improved on the steroids but symptoms are starting to return now that she's off them again. Primarily congestion and drainage leading to cough.       Review of systems: A 14-point review of systems has been conducted and is negative for any notable symptoms, except as dictated in the history of present illness.     Physical Exam:  Vital signs: [facility] 05/08/2012    General Appearance: No acute distress, appropriate demeanor, conversant  Eyes: moist conjunctivae; EOMI; pupils symmetric; visual acuity grossly intact; no proptosis  Head: normocephalic; overall symmetric appearance without deformity  Face: overall symmetric without deformity  Ears: Normal appearance of external ear  Nose: No external deformity  Oral Cavity/oropharynx: Normal appearance of mucosa  Neck: no palpable lymphadenopathy; thyroid without palpable nodules  Lungs: symmetric chest rise; no wheezing  CV: Good distal perfusion; normal hear rate  Extremities: No deformity  Neurologic Exam: Cranial nerves II-XII are grossly intact      Procedure Note  Procedure performed: Rigid nasal endoscopy  Indication: To evaluate for sinonasal pathology not visualized on routine anterior rhinoscopy  Anesthesia: 4% topical lidocaine with 0.05 % oxymetazoline  Description of procedure: A 30 degree, 3 mm rigid endoscope was inserted into bilateral nasal  cavities and the nasal valves, nasal cavity, middle meatus, sphenoethmoid recess, nasopharynx were evaluated for evidence of obstruction, edema, purulence, polyps and/or mass/lesion.     Benavides-Valentin Endoscopic Scoring System  Endoscopic observation Right Left   Polyps in middle meatus (0 = absent, 1 = restricted to middle meatus, 2 = Beyond middle meatus) 0 0   Discharge (0 = absent, 1 = thin and clear, 2 = thick, purulent) 1 1   Edema (0 = absent, 1 = mild-moderate, 2 = moderate-severe) 1 1   Crusting (0 = absent, 1 = mild-moderate, 2 = moderate-severe) 0 0   Scarring (0= absent, 1 = mild-moderate, 2 = moderate-severe) 2 1   Total 4 3     Findings  RT: Scarring along the middle terminate, limiting evaluation of the ethmoids and sphenoid with thick drainage in the middle medus.  LT:  improvement in underlying edema with thin, possibly purulent drainage from the ethmoid sinuses.    The patient tolerated the procedure well without complication.     Imaging Review:  2024 CT SINUS W/O CONTRAST   Impression: Imaging performed primarily for the purposes of  stereotactic localization. Postsurgical changes of paranasal sinus  surgery. Opacification/mucosal thickening of the pansinus, as  described in detail.     Pathology Review:  BILATERAL SINUS CONTENT, EXCISION:  -Benign sinonasal mucosa with chronic inflammation and patchy increased eosinophilic infiltrate.    Assessment/Medical Decision Makin year old female now post-op from bilateral FESS. Endoscopy demonstrates persistent inflammation bilaterally.      Plan:  Start xhance nasal spray.  Will continue budesonide nasal rinses until she gets the xhance.  We sent a culture collected in clinic today and based on results, consider oral versus antibiotic rinses.  Plan for revision sinus surgery in late November versus early December, depending on her schedule. Patient had a robust inflammatory reaction after surgery the first time so will plan to use Vectra in  the frontal sinuses. Will also plan for post-op steroid course.       Arnulfo Rodriguez MD    [facility]  Rhinology, Endoscopic Skull Base Surgery  [facility]  Department of Otolaryngology - Head & Neck Surgery    ~~~~~~~~~~~~~~~~~~~~~~~~~~~~~~~~~~~~~~~~~~~~~~~~~~~~~~~~~~~~~~~~~~~~~~~~~~~~~~~~~~~~~~~~~~~~~~~~~~~~~~~~~~~~~~~~~~~~~~~~~~~~~~~~~~~~~~~    Past Medical History:   Diagnosis Date    ASCUS favor benign 8/2015    colp - neg    Congenital anomalies of other endocrine glands 2004    Cyst on thyroid    LSIL (low grade squamous intraepithelial lesion) on Pap smear 6/2014    neg high risk  HPV        Past Surgical History:   Procedure Laterality Date    OPERATIVE HYSTEROSCOPY WITH MORCELLATOR N/A 7/5/2018    Procedure: OPERATIVE HYSTEROSCOPY WITH MORCELLATOR (SUERO & NEPHEW);  OPERATIVE HYSTEROSCOPY AND POLYPECTOMY WITH SUERO NEPHEW MORCELLATOR AND FLUID MANAGEMENT ;  Surgeon: Gerard Cole MD;  Location: [facility]    [facility] ENDOSCOPIC SINUS SURGERY Bilateral 6/10/2024    Procedure: Endoscopic bilateral maxillary antrostomy, total ethmoidectomy, sphenoidotomy, frontal sinusotomy, and endo septoplasty;  Surgeon: Arnulfo Rodriguez MD;  Location: [facility] OR    THYROID BIOPSY  2004    [facility] EXCISE HAND/FOOT NEUROMA  2002        Family History   Problem Relation Age of Onset    Atrial fibrillation Mother         pacemaker    Mitral valve prolapse Mother     Heart Disease Father         pacemaker    Cardiovascular Father         Pacemaker    C.A.D. Father     Anemia Brother     Heart Disease Maternal Grandmother         CHF    C.A.D. Maternal Grandmother     Heart Failure Maternal Grandmother     Gastrointestinal Disease Maternal Grandfather     Alcohol/Drug Maternal Grandfather         Social History     Socioeconomic History    Marital status: Single    Number of children: 0    Years of education: 16   Occupational History    Occupation:       Employer: [employer]   Tobacco Use    Smoking status: Never    Smokeless tobacco: Never   Substance and Sexual Activity    Alcohol use: Yes     Comment: occ    Drug use: No    Sexual activity: Not Currently     Partners: Male     Birth control/protection: Condom   Social History Narrative    Caffeine intake/servings daily - 0    Calcium intake/servings daily - 2-3    Exercise 4-5 times weekly cardio,wt, aerobics daily walks    Seatbelts used - Yes    Guns stored in the home - No    Self Breast Exam - Yes    Pap test up to date -  Yes    Eye exam up to date -  Yes    Dental exam up to date -  Yes    DEXA scan up to date -  Not Applicable    Flex Sig/Colonoscopy up to date -  Not Applicable    Mammography up to date - No    Immunizations reviewed and up to date - 2002 TD    Abuse: Current or Past (Physical, Sexual or Emotional) - No    Do you feel safe in your environment - Yes    Do you cope well with stress - Yes    Do you suffer from insomnia - No    Last updated by: SOFÍA Lynne RN  5/10/11     Social Determinants of Health     Financial Resource Strain: Low Risk  (4/2/2024)    Financial Resource Strain     Within the past 12 months, have you or your family members you live with been unable to get utilities (heat, electricity) when it was really needed?: No   Food Insecurity: Low Risk  (4/2/2024)    Food Insecurity     Within the past 12 months, did you worry that your food would run out before you got money to buy more?: No     Within the past 12 months, did the food you bought just not last and you didn t have money to get more?: No   Transportation Needs: Low Risk  (4/2/2024)    Transportation Needs     Within the past 12 months, has lack of transportation kept you from medical appointments, getting your medicines, non-medical meetings or appointments, work, or from getting things that you need?: No   Interpersonal Safety: Low Risk  (4/2/2024)    Interpersonal Safety     Do you feel physically and  emotionally safe where you currently live?: Yes     Within the past 12 months, have you been hit, slapped, kicked or otherwise physically hurt by someone?: No     Within the past 12 months, have you been humiliated or emotionally abused in other ways by your partner or ex-partner?: No   Housing Stability: Low Risk  (4/2/2024)    Housing Stability     Do you have housing? : Yes     Are you worried about losing your housing?: No

## 2024-09-26 NOTE — LETTER
9/26/2024       RE: Peace Boudreaux  [address]  [place] [address]     Dear Colleague,    Thank you for referring your patient, Peace Boudreaux, to the [facility] EAR NOSE AND THROAT CLINIC [place] [facility]. Please see a copy of my visit note below.      [facility]  Return Visit  Encounter date:   September 26, 2024    Chief Complaint:   Chronic maxillary sinusitis     ID:Chronic pansinusitis     Interval History:   Peace Boudreaux is a 59 year old female who presents for second follow-up from [facility] on 6/10/24. Has had some return of drainage and increase in morning headaches. Has noticed rancid smell  Interval Hx 8/21/24:  Returns for third visit. Still with significant drainage and rancid smell. CT demonstrates significant inflammation.     Interval History:   Peace Boudreaux is a 59 year old female who presents for follow up Chronic pansinusitis. Improved on the steroids but symptoms are starting to return now that she's off them again. Primarily congestion and drainage leading to cough.       Review of systems: A 14-point review of systems has been conducted and is negative for any notable symptoms, except as dictated in the history of present illness.     Physical Exam:  Vital signs: [facility] 05/08/2012    General Appearance: No acute distress, appropriate demeanor, conversant  Eyes: moist conjunctivae; EOMI; pupils symmetric; visual acuity grossly intact; no proptosis  Head: normocephalic; overall symmetric appearance without deformity  Face: overall symmetric without deformity  Ears: Normal appearance of external ear  Nose: No external deformity  Oral Cavity/oropharynx: Normal appearance of mucosa  Neck: no palpable lymphadenopathy; thyroid without palpable nodules  Lungs: symmetric chest rise; no wheezing  CV: Good distal perfusion; normal hear rate  Extremities: No deformity  Neurologic Exam: Cranial nerves II-XII  are grossly intact      Procedure Note  Procedure performed: Rigid nasal endoscopy  Indication: To evaluate for sinonasal pathology not visualized on routine anterior rhinoscopy  Anesthesia: 4% topical lidocaine with 0.05 % oxymetazoline  Description of procedure: A 30 degree, 3 mm rigid endoscope was inserted into bilateral nasal cavities and the nasal valves, nasal cavity, middle meatus, sphenoethmoid recess, nasopharynx were evaluated for evidence of obstruction, edema, purulence, polyps and/or mass/lesion.     [place]-Valentin Endoscopic Scoring System  Endoscopic observation Right Left   Polyps in middle meatus (0 = absent, 1 = restricted to middle meatus, 2 = Beyond middle meatus) 0 0   Discharge (0 = absent, 1 = thin and clear, 2 = thick, purulent) 1 1   Edema (0 = absent, 1 = mild-moderate, 2 = moderate-severe) 1 1   Crusting (0 = absent, 1 = mild-moderate, 2 = moderate-severe) 0 0   Scarring (0= absent, 1 = mild-moderate, 2 = moderate-severe) 2 1   Total 4 3     Findings  RT: Scarring along the middle terminate, limiting evaluation of the ethmoids and sphenoid with thick drainage in the middle medus.  LT:  improvement in underlying edema with thin, possibly purulent drainage from the ethmoid sinuses.    The patient tolerated the procedure well without complication.     Imaging Review:  2024 CT SINUS W/O CONTRAST   Impression: Imaging performed primarily for the purposes of  stereotactic localization. Postsurgical changes of paranasal sinus  surgery. Opacification/mucosal thickening of the pansinus, as  described in detail.     Pathology Review:  BILATERAL SINUS CONTENT, EXCISION:  -Benign sinonasal mucosa with chronic inflammation and patchy increased eosinophilic infiltrate.    Assessment/Medical Decision Makin year old female now post-op from bilateral FESS. Endoscopy demonstrates persistent inflammation bilaterally.      Plan:  Start xhance nasal spray.  Will continue budesonide nasal rinses  until she gets the xhance.  We sent a culture collected in clinic today and based on results, consider oral versus antibiotic rinses.  Plan for revision sinus surgery in late November versus early December, depending on her schedule. Patient had a robust inflammatory reaction after surgery the first time so will plan to use Vectra in the frontal sinuses. Will also plan for post-op steroid course.       Arnulfo Rodriguez MD    [facility]  Rhinology, Endoscopic Skull Base Surgery  [facility]  Department of Otolaryngology - Head & Neck Surgery    ~~~~~~~~~~~~~~~~~~~~~~~~~~~~~~~~~~~~~~~~~~~~~~~~~~~~~~~~~~~~~~~~~~~~~~~~~~~~~~~~~~~~~~~~~~~~~~~~~~~~~~~~~~~~~~~~~~~~~~~~~~~~~~~~~~~~~~~    Past Medical History:   Diagnosis Date     ASCUS favor benign 8/2015    colp - neg     Congenital anomalies of other endocrine glands 2004    Cyst on thyroid     LSIL (low grade squamous intraepithelial lesion) on Pap smear 6/2014    neg high risk  HPV        Past Surgical History:   Procedure Laterality Date     OPERATIVE HYSTEROSCOPY WITH MORCELLATOR N/A 7/5/2018    Procedure: OPERATIVE HYSTEROSCOPY WITH MORCELLATOR (SUERO & NEPHEW);  OPERATIVE HYSTEROSCOPY AND POLYPECTOMY WITH SUERO NEPHEW MORCELLATOR AND FLUID MANAGEMENT ;  Surgeon: Gerard Cloe MD;  Location:  [facility] END[facility]COPIC [facility] SURGERY Bilateral 6/10/2024    Procedure: Endoscopic bilateral maxillary antrostomy, total ethmoidectomy, sphenoidotomy, frontal sinusotomy, and endo septoplasty;  Surgeon: Arnulfo Rodriguez MD;  Location: [facility] OR     THYROID BIOPSY  2004     [facility] EXCISE HAND/FOOT NEUROMA  2002        Family History   Problem Relation Age of Onset     Atrial fibrillation Mother         pacemaker     Mitral valve prolapse Mother      Heart Disease Father         pacemaker     Cardiovascular Father         Pacemaker     C.A.D. Father      Anemia Brother      Heart Disease Maternal Grandmother          CHF     C.A.D. Maternal Grandmother      Heart Failure Maternal Grandmother      Gastrointestinal Disease Maternal Grandfather      Alcohol/Drug Maternal Grandfather         Social History     Socioeconomic History     Marital status: Single     Number of children: 0     Years of education: 16   Occupational History     Occupation:      Employer: [employer]   Tobacco Use     Smoking status: Never     Smokeless tobacco: Never   Substance and Sexual Activity     Alcohol use: Yes     Comment: occ     Drug use: No     Sexual activity: Not Currently     Partners: Male     Birth control/protection: Condom   Social History Narrative    Caffeine intake/servings daily - 0    Calcium intake/servings daily - 2-3    Exercise 4-5 times weekly cardio,wt, aerobics daily walks    Seatbelts used - Yes    Guns stored in the home - No    Self Breast Exam - Yes    Pap test up to date -  Yes    Eye exam up to date -  Yes    Dental exam up to date -  Yes    DEXA scan up to date -  Not Applicable    Flex Sig/Colonoscopy up to date -  Not Applicable    Mammography up to date - No    Immunizations reviewed and up to date - 2002 TD    Abuse: Current or Past (Physical, Sexual or Emotional) - No    Do you feel safe in your environment - Yes    Do you cope well with stress - Yes    Do you suffer from insomnia - No    Last updated by: SOFÍA Lynne RN  5/10/11     Social Determinants of Health     Financial Resource Strain: Low Risk  (4/2/2024)    Financial Resource Strain      Within the past 12 months, have you or your family members you live with been unable to get utilities (heat, electricity) when it was really needed?: No   Food Insecurity: Low Risk  (4/2/2024)    Food Insecurity      Within the past 12 months, did you worry that your food would run out before you got money to buy more?: No      Within the past 12 months, did the food you bought just not last and you didn t have money to get more?: No    Transportation Needs: Low Risk  (4/2/2024)    Transportation Needs      Within the past 12 months, has lack of transportation kept you from medical appointments, getting your medicines, non-medical meetings or appointments, work, or from getting things that you need?: No   Interpersonal Safety: Low Risk  (4/2/2024)    Interpersonal Safety      Do you feel physically and emotionally safe where you currently live?: Yes      Within the past 12 months, have you been hit, slapped, kicked or otherwise physically hurt by someone?: No      Within the past 12 months, have you been humiliated or emotionally abused in other ways by your partner or ex-partner?: No   Housing Stability: Low Risk  (4/2/2024)    Housing Stability      Do you have housing? : Yes      Are you worried about losing your housing?: No           Again, thank you for allowing me to participate in the care of your patient.      Sincerely,    Arnulfo Rodriguez MD

## 2024-09-26 NOTE — NURSING NOTE
"Chief Complaint   Patient presents with    RECHECK   Blood pressure 135/79, pulse 107, height 1.651 m (5' 5\"), last menstrual period 05/08/2012, SpO2 95%, not currently breastfeeding. Drake Aly, EMT    "

## 2024-09-28 LAB — BACTERIA SPEC CULT: NO GROWTH

## 2024-10-02 ENCOUNTER — TELEPHONE (OUTPATIENT)
Dept: OTOLARYNGOLOGY | Facility: CLINIC | Age: 59
End: 2024-10-02
Payer: COMMERCIAL

## 2024-10-02 ENCOUNTER — HOSPITAL ENCOUNTER (OUTPATIENT)
Facility: AMBULATORY SURGERY CENTER | Age: 59
End: 2024-10-02
Attending: STUDENT IN AN ORGANIZED HEALTH CARE EDUCATION/TRAINING PROGRAM | Admitting: STUDENT IN AN ORGANIZED HEALTH CARE EDUCATION/TRAINING PROGRAM
Payer: COMMERCIAL

## 2024-10-02 NOTE — TELEPHONE ENCOUNTER
Scheduled surgery with Dr. Rodriguez on 11/29/2024 - patient requested the week of thanksgiving    Spoke with: Patient via [employer]    Surgery is located at [facility]    Patient will be seen for their H&P by their PCP within 30 days of surgery - Confirmed PCP on file is up to date     Does patient need a consult before upcoming surgery? No    Anesthesia type: General    Requested Imaging required for surgery: Yes: [facility] protocol CT sinus before surgery - will call imaging once surgery is scheduled    Patient is scheduled for their 1 week post op on 12/5/2024 at 850am & 3-4 week post op on 12/19/2024 at 10am with Dr. Dr. Rodriguez (he is out of town at 4 weeks)    Patient will receive their surgery packet via [employer] per their preference    Patient was not provided a start time for surgery & is aware they will receive this information 2-3 days before surgery    Additional comments: Patient was instructed to call back with any further questions or concerns.     Kacie Valero on 10/2/2024 at 1:06 PM

## 2024-10-03 ENCOUNTER — TELEPHONE (OUTPATIENT)
Dept: OTOLARYNGOLOGY | Facility: CLINIC | Age: 59
End: 2024-10-03
Payer: COMMERCIAL

## 2024-10-03 ENCOUNTER — TELEPHONE (OUTPATIENT)
Dept: RADIATION ONCOLOGY | Facility: CLINIC | Age: 59
End: 2024-10-03
Payer: COMMERCIAL

## 2024-10-03 ENCOUNTER — MYC MEDICAL ADVICE (OUTPATIENT)
Dept: OTOLARYNGOLOGY | Facility: CLINIC | Age: 59
End: 2024-10-03
Payer: COMMERCIAL

## 2024-10-03 DIAGNOSIS — J32.4 CHRONIC PANSINUSITIS: Primary | ICD-10-CM

## 2024-10-03 LAB — BACTERIA SPEC CULT: ABNORMAL

## 2024-10-03 RX ORDER — DOXYCYCLINE HYCLATE 100 MG
100 TABLET ORAL 2 TIMES DAILY
Qty: 28 TABLET | Refills: 0 | Status: SHIPPED | OUTPATIENT
Start: 2024-10-03 | End: 2024-10-17

## 2024-10-03 NOTE — TELEPHONE ENCOUNTER
Retail Pharmacy Prior Authorization Team   Phone: [phone]    Message  Received: Today  Debra German RN  P [facility]  Hi! I just got a message that this pt needs a PA run on the most recent prescription of budesonide, is someone able to assist with this?    Thanks  Debra OCONNOR

## 2024-10-03 NOTE — TELEPHONE ENCOUNTER
Writer LVM stating that based off of pts culture results Dr. Lal will be sending in 2 week worth of doxycycline, writer stated that if pt is not comfortable taking more abx she does not have to but it may help with symptoms until surgery. Writer encouraged pt to call back with questions.      Debra German RN

## 2024-10-08 NOTE — TELEPHONE ENCOUNTER
Prior Authorization Not Needed per Insurance    Medication: BUDESONIDE 0.5 MG/2ML IN SUSP  Insurance Company: [employer] Non-Specialty PA's - Phone [phone] Fax [phone]  Expected CoPay: $    Pharmacy Filling the Rx: [id number] - [place], [place] - [address] [place] [address] AT [facility] OF  [address]  Pharmacy Notified: y  Patient Notified: Instructed pharmacy to notify patient once order is ready.     Pharmacy confirmed paid claim.

## 2024-10-14 ENCOUNTER — OFFICE VISIT (OUTPATIENT)
Dept: CARDIOLOGY | Facility: CLINIC | Age: 59
End: 2024-10-14
Attending: NURSE PRACTITIONER
Payer: COMMERCIAL

## 2024-10-14 ENCOUNTER — TELEPHONE (OUTPATIENT)
Dept: CARDIOLOGY | Facility: CLINIC | Age: 59
End: 2024-10-14
Payer: COMMERCIAL

## 2024-10-14 VITALS
BODY MASS INDEX: 30.22 KG/M2 | DIASTOLIC BLOOD PRESSURE: 81 MMHG | WEIGHT: 181.6 LBS | HEART RATE: 80 BPM | SYSTOLIC BLOOD PRESSURE: 124 MMHG | OXYGEN SATURATION: 99 %

## 2024-10-14 DIAGNOSIS — E78.5 HYPERLIPIDEMIA LDL GOAL <100: ICD-10-CM

## 2024-10-14 DIAGNOSIS — M79.89 LEG SWELLING: Primary | ICD-10-CM

## 2024-10-14 PROCEDURE — 99213 OFFICE O/P EST LOW 20 MIN: CPT | Performed by: INTERNAL MEDICINE

## 2024-10-14 PROCEDURE — 93010 ELECTROCARDIOGRAM REPORT: CPT | Performed by: INTERNAL MEDICINE

## 2024-10-14 ASSESSMENT — PAIN SCALES - GENERAL: PAINLEVEL: NO PAIN (0)

## 2024-10-14 NOTE — TELEPHONE ENCOUNTER
10/14/2024 2:57PM Karime Shaikh  Patient confirmed scheduled appointment:  Date: 10/24/2024  Time: 12:45PM  Visit type: US Venous Competency Bilateral   Provider: BIANKA  Location: [address] [address]  Testing/imaging: Pt waiting to schedule CT Coronary Calcium Scan until after Ultrasound is complete.   Additional notes: 10/14 Pt waiting to schedule CT Coronary Calcium Scan until after ultrasound is complete. PHILIPPE Shaikh 10/14/2024 2:57PM

## 2024-10-14 NOTE — Clinical Note
10/14/2024      RE: Peace J Kanika  [address]  [place] [address]       Dear Colleague,    Thank you for the opportunity to participate in the care of your patient, Peace Boudreaux, at the [facility] HEART CLINIC [place] [facility]. Please see a copy of my visit note below.    No notes on file    Please do not hesitate to contact me if you have any questions/concerns.     Sincerely,     Manuel Logan MD

## 2024-10-14 NOTE — NURSING NOTE
Chief Complaint   Patient presents with    New Patient     Db Logan pt, referred d/t HLD and statin intolerance     Vitals were taken, medications reconciled, and EKG was performed.    Apoorva Morin EMT  1:10 PM

## 2024-10-14 NOTE — PATIENT INSTRUCTIONS
October 14, 2024    Cardiology Provider You Saw During Your Visit: Dr. Logan      Medication Changes: None      Follow Up:   -Labs when able  -Calcium scan and leg ultrasound when able  -Follow up to be determined based on results      Follow the American Heart Association Diet and Lifestyle Recommendations:  -Limit saturated fat, trans fat, sodium, red meat, sweets and sugar-sweetened beverages. If you choose to eat red meat, compare labels and select the leanest cuts available.  -Aim for at least 150 minutes of moderate physical activity or 75 minutes of vigorous physical activity - or an equal combination of both - each week.      To Reach Us:  -During business hours: [phone], press option # 1 to schedule an appointment or to leave a message for your care team.     -After hours, weekends or holidays: [phone], press option #4 and ask to speak to the on-call cardiologist. Inform them you are a patient at the [place].        **If you have a cardiac device, please make sure you schedule an in-person device check just prior to your cardiology provider appointments**        Palak Trimble RN  Cardiology Care Coordinator - General Cardiology  [facility]

## 2024-10-14 NOTE — PROGRESS NOTES
HPI:     Peace Boudreaux is a 60yo woman with PMHx of GERD, HLD with statin intolerance, and chronic sinusitis, presenting to lipid clinic.    At baseline pt is physically active, walks a lot, does Yoga 5 days a week. No chest pain or SOB or WALKER. Patient's diet is moderately healthy, plenty of fruit and veggie servings. She tries to avoid red meats. Patient was never a smoker, weekly glass of wine, no drugs.     With regards to family history. Patient's mother had aortic valve problems s/p TAVR and has a PPM and hx of Afib. She has no HLD, HTN, or MI's. Patient's maternal grandmother also had cardiac valvular issues but as far as we know no CAD. Pt's father has no known CAD disease but has a PPM. Pt has 5 siblings, one of her brother's has Afib and valvular issues.    Pt had a TTE on 1/25/24 which was entirely normal. ECG today shows NSR without ischemic ST-T segment changes. Lipid panel from 8/9/24 showed Total Cholesterol of 266, HDL 69, . Pt was previously on Crestor 5 mg daily and pt developed significant full body myalgias with it. When she stopped the Crestor her myalgias completely resolved.     ASCVD is 2.5%      PAST MEDICAL HISTORY:  Past Medical History:   Diagnosis Date    ASCUS favor benign 8/2015    colp - neg    Congenital anomalies of other endocrine glands 2004    Cyst on thyroid    LSIL (low grade squamous intraepithelial lesion) on Pap smear 6/2014    neg high risk  HPV       CURRENT MEDICATIONS:  Current Outpatient Medications   Medication Sig Dispense Refill    albuterol (PROAIR HFA/PROVENTIL HFA/VENTOLIN HFA) 108 (90 Base) MCG/ACT inhaler INHALE 2 PUFFS BY MOUTH EVERY 4-6 HOURS AS NEEDED      budesonide (PULMICORT) 0.5 MG/2ML neb solution Empty contents of ampule into 240mL of saline solution and rinse both nasal cavities as instructed twice daily. 120 mL 0    COENZYME Q-10 PO       docusate sodium (COLACE) 100 MG capsule Take 1 capsule (100 mg) by mouth 2 times daily as needed for  constipation 20 capsule 0    doxycycline hyclate (VIBRA-TABS) 100 MG tablet Take 1 tablet (100 mg) by mouth 2 times daily for 14 days. 28 tablet 0    FIBER PO       fluticasone (FLONASE) 50 MCG/ACT nasal spray Spray 1 spray into both nostrils daily      fluticasone (XHANCE) 93 MCG/ACT nasal spray Spray 2 sprays into both nostrils 2 times daily. 16 mL 3    Multiple Vitamins-Minerals (WOMENS MULTIVITAMIN PLUS PO) Take  by mouth.      mupirocin (BACTROBAN) 2 % external ointment Apply a small amount to affected nare 2 times a day until follow up. 30 g 1    ondansetron (ZOFRAN ODT) 4 MG ODT tab Take 1 tablet (4 mg) by mouth every 6 hours as needed for nausea or vomiting 8 tablet 0    sodium chloride (OCEAN) 0.65 % nasal spray Spray 1 spray in nostril 4 times daily 15 mL 3    Turmeric (QC TUMERIC COMPLEX PO)       UNABLE TO FIND MEDICATION NAME: sivanraghuana      WIXELA INHUB 250-50 MCG/ACT inhaler 2 times daily      rosuvastatin (CRESTOR) 5 MG tablet Take 1 tablet (5 mg) by mouth daily (Patient not taking: Reported on 6/17/2024) 90 tablet 3       PAST SURGICAL HISTORY:  Past Surgical History:   Procedure Laterality Date    OPERATIVE HYSTEROSCOPY WITH MORCELLATOR N/A 7/5/2018    Procedure: OPERATIVE HYSTEROSCOPY WITH MORCELLATOR (SUERO & NEPHEW);  OPERATIVE HYSTEROSCOPY AND POLYPECTOMY WITH SUERO NEPHEW MORCELLATOR AND FLUID MANAGEMENT ;  Surgeon: Gerard Cole MD;  Location:  [facility]    OPTICAL TRACKING SYSTEM ENDOSCOPIC SINUS SURGERY Bilateral 6/10/2024    Procedure: Endoscopic bilateral maxillary antrostomy, total ethmoidectomy, sphenoidotomy, frontal sinusotomy, and endo septoplasty;  Surgeon: Arnulfo Rodriguez MD;  Location: [facility] OR    THYROID BIOPSY  [address] [place] [address] EXCISE HAND/FOOT NEUROMA  2002       ALLERGIES     Allergies   Allergen Reactions    No Known Drug Allergy        FAMILY HISTORY:  Family History   Problem Relation Age of Onset    Atrial fibrillation Mother         pacemaker    Mitral valve  prolapse Mother     Heart Disease Father         pacemaker    Cardiovascular Father         Pacemaker    C.A.D. Father     Anemia Brother     Heart Disease Maternal Grandmother         CHF    C.A.D. Maternal Grandmother     Heart Failure Maternal Grandmother     Gastrointestinal Disease Maternal Grandfather     Alcohol/Drug Maternal Grandfather        SOCIAL HISTORY:  Social History     Socioeconomic History    Marital status: Single     Spouse name: None    Number of children: 0    Years of education: 16    Highest education level: None   Occupational History    Occupation:      Employer: [employer]   Tobacco Use    Smoking status: Never    Smokeless tobacco: Never   Substance and Sexual Activity    Alcohol use: Yes     Comment: occ    Drug use: No    Sexual activity: Not Currently     Partners: Male     Birth control/protection: Condom   Social History Narrative    Caffeine intake/servings daily - 0    Calcium intake/servings daily - 2-3    Exercise 4-5 times weekly cardio,wt, aerobics daily walks    Seatbelts used - Yes    Guns stored in the home - No    Self Breast Exam - Yes    Pap test up to date -  Yes    Eye exam up to date -  Yes    Dental exam up to date -  Yes    DEXA scan up to date -  Not Applicable    Flex Sig/Colonoscopy up to date -  Not Applicable    Mammography up to date - No    Immunizations reviewed and up to date - 2002 TD    Abuse: Current or Past (Physical, Sexual or Emotional) - No    Do you feel safe in your environment - Yes    Do you cope well with stress - Yes    Do you suffer from insomnia - No    Last updated by: SOFÍA Lynne RN  5/10/11     Social Determinants of Health     Financial Resource Strain: Low Risk  (4/2/2024)    Financial Resource Strain     Within the past 12 months, have you or your family members you live with been unable to get utilities (heat, electricity) when it was really needed?: No   Food Insecurity: Low Risk  (4/2/2024)    Food Insecurity      Within the past 12 months, did you worry that your food would run out before you got money to buy more?: No     Within the past 12 months, did the food you bought just not last and you didn t have money to get more?: No   Transportation Needs: Low Risk  (4/2/2024)    Transportation Needs     Within the past 12 months, has lack of transportation kept you from medical appointments, getting your medicines, non-medical meetings or appointments, work, or from getting things that you need?: No   Interpersonal Safety: Low Risk  (4/2/2024)    Interpersonal Safety     Do you feel physically and emotionally safe where you currently live?: Yes     Within the past 12 months, have you been hit, slapped, kicked or otherwise physically hurt by someone?: No     Within the past 12 months, have you been humiliated or emotionally abused in other ways by your partner or ex-partner?: No   Housing Stability: Low Risk  (4/2/2024)    Housing Stability     Do you have housing? : Yes     Are you worried about losing your housing?: No       ROS:   Constitutional: No fever, chills, or sweats. No weight gain/loss   ENT: No visual disturbance, ear ache, epistaxis, sore throat  Allergies/Immunologic: Negative.   Respiratory: No cough, hemoptysia  Cardiovascular: As per HPI  GI: No nausea, vomiting, hematemesis, melena, or hematochezia  : No urinary frequency, dysuria, or hematuria  Integument: Negative  Psychiatric: Negative  Neuro: Negative  Endocrinology: Negative   Musculoskeletal: Negative    EXAM:  /81 (BP Location: Right arm, Patient Position: Chair, Cuff Size: Adult Large)   Pulse 80   Wt 82.4 kg (181 lb 9.6 oz)   LMP 05/08/2012   SpO2 99%   BMI 30.22 kg/m    In general, the patient is a pleasant female in no apparent distress.    HEENT: NC/AT.  PERRLA.  EOMI.  Sclerae white, not injected.  Nares clear.  Pharynx without erythema or exudate.  Dentition intact.    Neck: No adenopathy.  No thyromegaly. Carotids +4/4 bilaterally  "without bruits.  No jugular venous distension.   Heart: RRR. Normal S1, S2 splits physiologically. No murmur, rub, click, or gallop. The PMI is in the 5th ICS in the midclavicular line. There is no heave.    Lungs: CTA.  No ronchi, wheezes, rales.  No dullness to percussion.   Abdomen: Soft, nontender, nondistended. No organomegaly.  No bruits.   Extremities: No clubbing, cyanosis, or edema.  The pulses are +4/4 at the radial, brachial, femoral, popliteal, DP, and PT sites bilaterally.  No bruits are noted.  Neurologic: Alert and oriented to person/place/time, normal speech, gait and affect  Skin: No petechiae, purpura or rash.    Labs:  LIPID RESULTS:  Lab Results   Component Value Date    CHOL 266 (H) 08/09/2024    CHOL 224 (H) 07/28/2015    HDL 69 08/09/2024    HDL 88 07/28/2015     (H) 08/09/2024     07/28/2015    TRIG 99 08/09/2024    TRIG 69 07/28/2015    CHOLHDLRATIO 2.5 07/28/2015    NHDL 197 (H) 08/09/2024       LIVER ENZYME RESULTS:  No results found for: \"AST\", \"ALT\"    CBC RESULTS:  Lab Results   Component Value Date    WBC 6.8 01/18/2024    WBC 5.2 07/28/2015    RBC 4.75 01/18/2024    RBC 4.23 07/28/2015    HGB 14.0 01/18/2024    HGB 13.0 07/28/2015    HCT 42.0 01/18/2024    HCT 38.9 07/28/2015    MCV 88 01/18/2024    MCV 92 07/28/2015    MCH 29.5 01/18/2024    MCH 30.7 07/28/2015    MCHC 33.3 01/18/2024    MCHC 33.4 07/28/2015    RDW 12.3 01/18/2024    RDW 12.9 07/28/2015     01/18/2024     07/28/2015       BMP RESULTS:  Lab Results   Component Value Date     01/18/2024    POTASSIUM 4.3 01/18/2024    POTASSIUM 4.3 08/29/2022    CHLORIDE 102 01/18/2024    CHLORIDE 106 08/29/2022    CO2 25 01/18/2024    CO2 25 08/29/2022    ANIONGAP 10 01/18/2024    ANIONGAP 5 08/29/2022     (H) 01/18/2024    GLC 98 08/29/2022    BUN 13.7 01/18/2024    BUN 13 08/29/2022    CR 0.73 01/18/2024    GFRESTIMATED >90 01/18/2024    BARRIE 9.7 01/18/2024        A1C RESULTS:  No results found " "for: \"A1C\"    INR RESULTS:  No results found for: \"INR\"    Procedures:      Assessment and Plan: ***      CC  Patient Care Team:  Christopher Hernandez MD as PCP - General (Family Medicine)  Jessica Kowalski, RN as Specialty Care Coordinator (Cardiology)  Luis Guerin, MINNIE WESTBROOK as Assigned Heart and Vascular Provider  Arnulfo Rodriguez MD as Assigned Surgical Provider  Manuel Logan MD as MD (Cardiovascular Disease)  Cal Dillon MD as Assigned PCP  Palak Trimble RN as Specialty Care Coordinator (Cardiology)  LUIS GUERIN    "

## 2024-10-15 LAB
ATRIAL RATE - MUSE: 79 BPM
DIASTOLIC BLOOD PRESSURE - MUSE: NORMAL MMHG
INTERPRETATION ECG - MUSE: NORMAL
P AXIS - MUSE: 65 DEGREES
PR INTERVAL - MUSE: 162 MS
QRS DURATION - MUSE: 60 MS
QT - MUSE: 350 MS
QTC - MUSE: 401 MS
R AXIS - MUSE: 62 DEGREES
SYSTOLIC BLOOD PRESSURE - MUSE: NORMAL MMHG
T AXIS - MUSE: 58 DEGREES
VENTRICULAR RATE- MUSE: 79 BPM

## 2024-10-24 ENCOUNTER — TELEPHONE (OUTPATIENT)
Dept: OTOLARYNGOLOGY | Facility: CLINIC | Age: 59
End: 2024-10-24

## 2024-10-24 NOTE — TELEPHONE ENCOUNTER
[facility] Call Center    Phone Message    May a detailed message be left on voicemail: yes     Reason for Call: Other: [address] called in regards to Xhance. This company doesn't manage that medication. It will go through their [employer] pharmacy benefits.      Action Taken: Other: [facility]    Travel Screening: Not Applicable     Date of Service:

## 2024-10-25 NOTE — TELEPHONE ENCOUNTER
This writer called patient to gather more information as to whether she was able to obtain the medication or whether there were any barriers. Patient unavailable so this writer left voicemail message for patient providing this writer's direct call back number to discuss.    KAYKAY CALDERÓN LPN on 10/25/2024 at 2:49 PM

## 2024-10-28 NOTE — TELEPHONE ENCOUNTER
Patient left voicemail message for this writer regarding message she received from this writer last week. Patient states that she is not taking the antibiotic, per directive of Dr. Rodriguez. Patient stated that she is using the inhaler. Patient also questioning about scheduling her CT scan prior to surgery. Patient has sent message to provider regarding CT scan and will follow up with patient once this writer has received a response from provider.    KAYKAY CALDERÓN LPN on 10/28/2024 at 9:26 AM

## 2024-10-30 NOTE — TELEPHONE ENCOUNTER
Writer [facility] stating that pt ward not need to get another CT scan done before surgery in November, Writer left direct line for pt to call back with questions.      Debra German RN

## 2024-11-01 DIAGNOSIS — J32.4 CHRONIC PANSINUSITIS: ICD-10-CM

## 2024-11-01 RX ORDER — BUDESONIDE 0.5 MG/2ML
INHALANT ORAL
Qty: 120 ML | Refills: 0 | Status: SHIPPED | OUTPATIENT
Start: 2024-11-01 | End: 2024-11-15

## 2024-11-01 NOTE — TELEPHONE ENCOUNTER
budesonide (PULMICORT) 0.5 MG/2ML neb solution 120 mL 0 9/26/2024     Last Office Visit: 9/26/24  Future Office visit:   12/5/24    Inhaled Steroids Protocol [id number]/01/2024 11:14 AM   Protocol Details Medication indicated for associated diagnosis        Routing refill request to provider for review/approval because:  Diagnosis does not match medication indication, cannot fill per protocol.     Oxana Tang RN  P Central Nursing/Red Flag Triage & Med Refill Team

## 2024-11-12 ENCOUNTER — ANCILLARY PROCEDURE (OUTPATIENT)
Dept: ULTRASOUND IMAGING | Facility: CLINIC | Age: 59
End: 2024-11-12
Attending: INTERNAL MEDICINE
Payer: COMMERCIAL

## 2024-11-12 DIAGNOSIS — E78.5 HYPERLIPIDEMIA LDL GOAL <100: Primary | ICD-10-CM

## 2024-11-12 DIAGNOSIS — I10 HTN (HYPERTENSION): ICD-10-CM

## 2024-11-12 DIAGNOSIS — M79.89 LEG SWELLING: ICD-10-CM

## 2024-11-12 PROCEDURE — 93970 EXTREMITY STUDY: CPT | Performed by: RADIOLOGY

## 2024-11-13 ENCOUNTER — MYC MEDICAL ADVICE (OUTPATIENT)
Dept: OTOLARYNGOLOGY | Facility: CLINIC | Age: 59
End: 2024-11-13
Payer: COMMERCIAL

## 2024-11-14 DIAGNOSIS — I87.2 VENOUS (PERIPHERAL) INSUFFICIENCY: Primary | ICD-10-CM

## 2024-11-15 ENCOUNTER — OFFICE VISIT (OUTPATIENT)
Dept: CARDIOLOGY | Facility: CLINIC | Age: 59
End: 2024-11-15
Attending: NURSE PRACTITIONER
Payer: COMMERCIAL

## 2024-11-15 ENCOUNTER — LAB (OUTPATIENT)
Dept: LAB | Facility: CLINIC | Age: 59
End: 2024-11-15
Payer: COMMERCIAL

## 2024-11-15 VITALS
SYSTOLIC BLOOD PRESSURE: 114 MMHG | OXYGEN SATURATION: 97 % | BODY MASS INDEX: 30.5 KG/M2 | DIASTOLIC BLOOD PRESSURE: 76 MMHG | HEART RATE: 77 BPM | WEIGHT: 183.3 LBS

## 2024-11-15 DIAGNOSIS — M79.89 LEG SWELLING: ICD-10-CM

## 2024-11-15 DIAGNOSIS — I10 HYPERTENSION, UNSPECIFIED TYPE: Primary | ICD-10-CM

## 2024-11-15 DIAGNOSIS — I10 HTN (HYPERTENSION): ICD-10-CM

## 2024-11-15 DIAGNOSIS — E78.5 HYPERLIPIDEMIA LDL GOAL <100: ICD-10-CM

## 2024-11-15 LAB
ANION GAP SERPL CALCULATED.3IONS-SCNC: 9 MMOL/L (ref 7–15)
ATRIAL RATE - MUSE: 76 BPM
BUN SERPL-MCNC: 16.5 MG/DL (ref 6–20)
CALCIUM SERPL-MCNC: 9.6 MG/DL (ref 8.8–10.4)
CHLORIDE SERPL-SCNC: 105 MMOL/L (ref 98–107)
CREAT SERPL-MCNC: 0.66 MG/DL (ref 0.51–0.95)
DIASTOLIC BLOOD PRESSURE - MUSE: NORMAL MMHG
EGFRCR SERPLBLD CKD-EPI 2021: >90 ML/MIN/1.73M2
ERYTHROCYTE [DISTWIDTH] IN BLOOD BY AUTOMATED COUNT: 12.5 % (ref 10–15)
GLUCOSE SERPL-MCNC: 96 MG/DL (ref 70–99)
HCO3 SERPL-SCNC: 25 MMOL/L (ref 22–29)
HCT VFR BLD AUTO: 40 % (ref 35–47)
HGB BLD-MCNC: 13.1 G/DL (ref 11.7–15.7)
INTERPRETATION ECG - MUSE: NORMAL
MCH RBC QN AUTO: 29.4 PG (ref 26.5–33)
MCHC RBC AUTO-ENTMCNC: 32.8 G/DL (ref 31.5–36.5)
MCV RBC AUTO: 90 FL (ref 78–100)
P AXIS - MUSE: 74 DEGREES
PLATELET # BLD AUTO: 298 10E3/UL (ref 150–450)
POTASSIUM SERPL-SCNC: 4.7 MMOL/L (ref 3.4–5.3)
PR INTERVAL - MUSE: 156 MS
QRS DURATION - MUSE: 84 MS
QT - MUSE: 376 MS
QTC - MUSE: 423 MS
R AXIS - MUSE: 55 DEGREES
RBC # BLD AUTO: 4.46 10E6/UL (ref 3.8–5.2)
SODIUM SERPL-SCNC: 139 MMOL/L (ref 135–145)
SYSTOLIC BLOOD PRESSURE - MUSE: NORMAL MMHG
T AXIS - MUSE: 63 DEGREES
VENTRICULAR RATE- MUSE: 76 BPM
WBC # BLD AUTO: 6.2 10E3/UL (ref 4–11)

## 2024-11-15 PROCEDURE — 99213 OFFICE O/P EST LOW 20 MIN: CPT | Performed by: NURSE PRACTITIONER

## 2024-11-15 PROCEDURE — 80048 BASIC METABOLIC PNL TOTAL CA: CPT | Performed by: PATHOLOGY

## 2024-11-15 PROCEDURE — 93005 ELECTROCARDIOGRAM TRACING: CPT

## 2024-11-15 PROCEDURE — 85027 COMPLETE CBC AUTOMATED: CPT | Performed by: PATHOLOGY

## 2024-11-15 PROCEDURE — 36415 COLL VENOUS BLD VENIPUNCTURE: CPT | Performed by: PATHOLOGY

## 2024-11-15 ASSESSMENT — PAIN SCALES - GENERAL: PAINLEVEL_OUTOF10: NO PAIN (0)

## 2024-11-15 NOTE — LETTER
11/15/2024      RE: Peace Nguch  [address]  [place] [address]       Dear Colleague,    Thank you for the opportunity to participate in the care of your patient, Peace Boudreaux, at the [facility] HEART [facility]. Please see a copy of my visit note below.    Preoperative Evaluation  [facility]  [address] [facility] [address]  Phone: [phone]  Fax: [phone]  Primary Provider: Christopher Hernandez MD  Pre-op Performing Provider: Elsi Lew, APRN CNP  Nov 15, 2024   {    Subjective  Elizabteh is a 59 year old, presenting for the following:  Elizabeth had a endoscopic bilateral maxillary antrostomy, total ethmoidectomy, sphenoidotomy, frontal sinusotomy, and endo septoplasty on June 6, 2024 by Dr. Rodriguez. Unfortunately her symptoms have returned which is mainly a chronic cough. 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 Decision was made to proceed with bilateral revision maxilarry anstrostomy, ethmoidectomy, sphenoidotomy, frontal sinusotomy. Scheduled for 11.29.24 by Dr. Rodriguez, ENT.          HPI related to upcoming procedure: Chronic pansinusitis which is chronic coughing. Diminished sense of smell.    Pt denies SOB, orthopnea, PND, chest pain, belly bloating, loss of appetite, LE edema. Pt states energy level is good.   She walks her dog 45 minutes/4x/week without difficulty. She does admit to getting winded climbing stairs - but upon further questioning states she is winded when she climbs 47 steps at a time.             Health Care Directive  Patient does not have a Health Care Directive: Discussed advance care planning with patient; however, patient declined at this time. She will bring a POLST form to the hospital.     Status of Chronic Conditions:  Hyperlipidemia: followed by Dr. Logan    Patient Active Problem List    Diagnosis Date Noted      Chronic pansinusitis 05/30/2024     Priority: Medium     Endometrial cells on cervical Pap smear inconsistent w/LMP 09/16/2015     Priority: Medium     Has US planned for 9/30       Early menopause 06/14/2014     Priority: Medium     Last menses 2/2011 at age 46.  Elevated FSH at 79 on  5/2011.         Absence of menstruation 06/14/2014     Priority: Medium     LSIL (low grade squamous intraepithelial lesion) on Pap smear 06/10/2014     Priority: Medium     6/10/14: LSIL, negative high risk HPV. Plan cotest in 1 yr.  7/2015 Pap Ascus, neg HPV, plan: 9/3/15:colp- WNL. Plan cotest pap & HPV in 1 year  Tracking started.  03/12/18 Spoke with pt, states she is being followed elsewhere for her Gyn care.  8/16/22 NIL, Neg HPV. Plan 3 yr co-test        Past Medical History:   Diagnosis Date     ASCUS favor benign 8/2015    colp - neg     Congenital anomalies of other endocrine glands 2004    Cyst on thyroid     LSIL (low grade squamous intraepithelial lesion) on Pap smear 6/2014    neg high risk  HPV     Past Surgical History:   Procedure Laterality Date     OPERATIVE HYSTEROSCOPY WITH MORCELLATOR N/A 7/5/2018    Procedure: OPERATIVE HYSTEROSCOPY WITH MORCELLATOR (SUERO & NEPHEW);  OPERATIVE HYSTEROSCOPY AND POLYPECTOMY WITH SUERO NEPHEW MORCELLATOR AND FLUID MANAGEMENT ;  Surgeon: Gerard Cole MD;  Location:  [facility] TRACKING SYSTEM ENDOSCOPIC SINUS SURGERY Bilateral 6/10/2024    Procedure: Endoscopic bilateral maxillary antrostomy, total ethmoidectomy, sphenoidotomy, frontal sinusotomy, and endo septoplasty;  Surgeon: Arnulfo Rodriguez MD;  Location: [facility] OR     THYROID BIOPSY  [address] [place] [address] EXCISE HAND/FOOT NEUROMA  2002     Current Outpatient Medications   Medication Sig Dispense Refill     COENZYME Q-10 PO        docusate sodium (COLACE) 100 MG capsule Take 1 capsule (100 mg) by mouth 2 times daily as needed for constipation 20 capsule 0     FIBER PO        Multiple Vitamins-Minerals (WOMENS  "MULTIVITAMIN PLUS PO) Take  by mouth.       Turmeric (QC TUMERIC COMPLEX PO)        UNABLE TO FIND MEDICATION NAME: william         Allergies   Allergen Reactions     No Known Drug Allergy         Social History     Tobacco Use     Smoking status: Never     Smokeless tobacco: Never   Substance Use Topics     Alcohol use: Yes     Comment: occ    Uses wine 1 glass 3-4 times per week with dinner       History   Drug Use No             Review of Systems  Constitutional, HEENT as per HPI, , cardiovascular, pulmonary, gi and gu systems are negative, except as otherwise noted.    ECG: NSR  Objective   /76 (BP Location: Right arm, Patient Position: Chair, Cuff Size: Adult Large)   Pulse 77   Wt 83.1 kg (183 lb 4.8 oz)   LMP 05/08/2012   SpO2 97%   BMI 30.50 kg/m     Estimated body mass index is 30.5 kg/m  as calculated from the following:    Height as of 9/26/24: 1.651 m (5' 5\").    Weight as of this encounter: 83.1 kg (183 lb 4.8 oz).    Exam:  Constitutional: healthy, alert, and no distress  Head: Normocephalic. No masses, lesions, tenderness or abnormalities  Neck: negative findings: no asymmetry, masses, or scars, no adenopathy, trachea midline and normal to palpation, thyroid normal to palpation, no jugular venous distention  ENT: ENT exam normal, no neck nodes or sinus tenderness and sinuses nontender  Cardiovascular: negative, PMI normal. No lifts, heaves, or thrills. RRR. No murmurs, clicks gallops or rub  Respiratory: negative, Percussion normal. Good diaphragmatic excursion. Lungs clear  Gastrointestinal: Abdomen soft, non-tender. BS normal. No masses, organomegaly  : Deferred  Musculoskeletal: extremities normal- no gross deformities noted, gait normal, and normal muscle tone  Skin: no suspicious lesions or rashes  Neurologic: Gait normal. Reflexes normal and symmetric. Sensation grossly WNL.  Psychiatric: mentation appears normal and affect normal/bright  Hematologic/Lymphatic/Immunologic: Normal " cervical lymph nodes       Assessment: Pt stable for upcoming sinusitis surgery and no anesthesia concerns.     Please do not hesitate to contact me if you have any questions/concerns.     Sincerely,     MINNIE Holland CNP

## 2024-11-15 NOTE — PROGRESS NOTES
Preoperative Evaluation  [facility] HEART [address] [address]  Phone: [phone]  Fax: [phone]  Primary Provider: Christopher Hernandez MD  Pre-op Performing Provider: MINNIE Holland CNP  Nov 15, 2024   {    Subjective   Elizabeth is a 59 year old, presenting for the following:  Elizabeth had a endoscopic bilateral maxillary antrostomy, total ethmoidectomy, sphenoidotomy, frontal sinusotomy, and endo septoplasty on June 6, 2024 by Dr. Rodriguez. Unfortunately her symptoms have returned which is mainly a chronic cough. 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 Decision was made to proceed with bilateral revision maxilarry anstrostomy, ethmoidectomy, sphenoidotomy, frontal sinusotomy. Scheduled for 11.29.24 by Dr. Rodriguez, ENT.          HPI related to upcoming procedure: Chronic pansinusitis which is chronic coughing. Diminished sense of smell.    Pt denies SOB, orthopnea, PND, chest pain, belly bloating, loss of appetite, LE edema. Pt states energy level is good.   She walks her dog 45 minutes/4x/week without difficulty. She does admit to getting winded climbing stairs - but upon further questioning states she is winded when she climbs 47 steps at a time.             Health Care Directive  Patient does not have a Health Care Directive: Discussed advance care planning with patient; however, patient declined at this time. She will bring a POLST form to the hospital.     Status of Chronic Conditions:  Hyperlipidemia: followed by Dr. Logan    Patient Active Problem List    Diagnosis Date Noted    Chronic pansinusitis 05/30/2024     Priority: Medium    Endometrial cells on cervical Pap smear inconsistent w/LMP 09/16/2015     Priority: Medium     Has US planned for 9/30      Early menopause 06/14/2014     Priority: Medium     Last menses 2/2011 at age 46.  Elevated FSH at 79 on  5/2011.        Absence of menstruation 06/14/2014     Priority:  Medium    LSIL (low grade squamous intraepithelial lesion) on Pap smear 06/10/2014     Priority: Medium     6/10/14: LSIL, negative high risk HPV. Plan cotest in 1 yr.  7/2015 Pap Ascus, neg HPV, plan: 9/3/15:colp- WNL. Plan cotest pap & HPV in 1 year  Tracking started.  03/12/18 Spoke with pt, states she is being followed elsewhere for her Gyn care.  8/16/22 NIL, Neg HPV. Plan 3 yr co-test        Past Medical History:   Diagnosis Date    ASCUS favor benign 8/2015    colp - neg    Congenital anomalies of other endocrine glands 2004    Cyst on thyroid    LSIL (low grade squamous intraepithelial lesion) on Pap smear 6/2014    neg high risk  HPV     Past Surgical History:   Procedure Laterality Date    OPERATIVE HYSTEROSCOPY WITH MORCELLATOR N/A 7/5/2018    Procedure: OPERATIVE HYSTEROSCOPY WITH MORCELLATOR (SUERO & NEPHEW);  OPERATIVE HYSTEROSCOPY AND POLYPECTOMY WITH SUERO NEPHEW MORCELLATOR AND FLUID MANAGEMENT ;  Surgeon: Gerard Cole MD;  Location:  [facility] ENDOSCOPIC S[facility]US SURGERY Bilateral 6/10/2024    Procedure: Endoscopic bilateral maxillary antrostomy, total ethmoidectomy, sphenoidotomy, frontal sinusotomy, and endo septoplasty;  Surgeon: Arnulfo Rodriguez MD;  [facility]    THYROID BIOPSY  [address] [place] [address] EXCISE HAND/FOOT NEUROMA  2002     Current Outpatient Medications   Medication Sig Dispense Refill    COENZYME Q-10 PO       docusate sodium (COLACE) 100 MG capsule Take 1 capsule (100 mg) by mouth 2 times daily as needed for constipation 20 capsule 0    FIBER PO       Multiple Vitamins-Minerals (WOMENS MULTIVITAMIN PLUS PO) Take  by mouth.      Turmeric (QC TUMERIC COMPLEX PO)       UNABLE TO FIND MEDICATION NAME: william         Allergies   Allergen Reactions    No Known Drug Allergy         Social History     Tobacco Use    Smoking status: Never    Smokeless tobacco: Never   Substance Use Topics    Alcohol use: Yes     Comment: occ    Uses wine 1 glass 3-4  "times per week with dinner       History   Drug Use No             Review of Systems  Constitutional, HEENT as per HPI, , cardiovascular, pulmonary, gi and gu systems are negative, except as otherwise noted.    ECG: NSR  Objective    /76 (BP Location: Right arm, Patient Position: Chair, Cuff Size: Adult Large)   Pulse 77   Wt 83.1 kg (183 lb 4.8 oz)   LMP 05/08/2012   SpO2 97%   BMI 30.50 kg/m     Estimated body mass index is 30.5 kg/m  as calculated from the following:    Height as of 9/26/24: 1.651 m (5' 5\").    Weight as of this encounter: 83.1 kg (183 lb 4.8 oz).    Exam:  Constitutional: healthy, alert, and no distress  Head: Normocephalic. No masses, lesions, tenderness or abnormalities  Neck: negative findings: no asymmetry, masses, or scars, no adenopathy, trachea midline and normal to palpation, thyroid normal to palpation, no jugular venous distention  ENT: ENT exam normal, no neck nodes or sinus tenderness and sinuses nontender  Cardiovascular: negative, PMI normal. No lifts, heaves, or thrills. RRR. No murmurs, clicks gallops or rub  Respiratory: negative, Percussion normal. Good diaphragmatic excursion. Lungs clear  Gastrointestinal: Abdomen soft, non-tender. BS normal. No masses, organomegaly  : Deferred  Musculoskeletal: extremities normal- no gross deformities noted, gait normal, and normal muscle tone  Skin: no suspicious lesions or rashes  Neurologic: Gait normal. Reflexes normal and symmetric. Sensation grossly WNL.  Psychiatric: mentation appears normal and affect normal/bright  Hematologic/Lymphatic/Immunologic: Normal cervical lymph nodes       Assessment: Pt stable for upcoming sinusitis surgery and no anesthesia concerns.   "

## 2024-11-15 NOTE — NURSING NOTE
Chief Complaint   Patient presents with    Follow Up     RETURN CORE     Vitals were taken, medications reconciled, and EKG was performed.    Marco Sapp, EMT  11:16 AM

## 2024-11-18 LAB
ATRIAL RATE - MUSE: 76 BPM
DIASTOLIC BLOOD PRESSURE - MUSE: NORMAL MMHG
INTERPRETATION ECG - MUSE: NORMAL
P AXIS - MUSE: 74 DEGREES
PR INTERVAL - MUSE: 156 MS
QRS DURATION - MUSE: 84 MS
QT - MUSE: 376 MS
QTC - MUSE: 423 MS
R AXIS - MUSE: 55 DEGREES
SYSTOLIC BLOOD PRESSURE - MUSE: NORMAL MMHG
T AXIS - MUSE: 63 DEGREES
VENTRICULAR RATE- MUSE: 76 BPM

## 2024-11-25 ENCOUNTER — MYC MEDICAL ADVICE (OUTPATIENT)
Dept: OTOLARYNGOLOGY | Facility: CLINIC | Age: 59
End: 2024-11-25
Payer: COMMERCIAL

## 2024-11-26 ENCOUNTER — TELEPHONE (OUTPATIENT)
Dept: OTOLARYNGOLOGY | Facility: CLINIC | Age: 59
End: 2024-11-26

## 2024-11-26 DIAGNOSIS — J32.4 CHRONIC PANSINUSITIS: Primary | ICD-10-CM

## 2024-11-26 NOTE — TELEPHONE ENCOUNTER
Writer LVM for pt stating that we need pt to complete a CT scan prior to surgery on Friday. Writer stated that pt is scheduled for a scan at 5:45pm on 11/26. Writer requesting call back from pt to confirm.    Debra German RN

## 2024-11-27 ENCOUNTER — ANCILLARY PROCEDURE (OUTPATIENT)
Dept: CT IMAGING | Facility: CLINIC | Age: 59
End: 2024-11-27
Attending: STUDENT IN AN ORGANIZED HEALTH CARE EDUCATION/TRAINING PROGRAM
Payer: COMMERCIAL

## 2024-11-27 ENCOUNTER — TELEPHONE (OUTPATIENT)
Dept: OTOLARYNGOLOGY | Facility: CLINIC | Age: 59
End: 2024-11-27

## 2024-11-27 DIAGNOSIS — J32.4 CHRONIC PANSINUSITIS: ICD-10-CM

## 2024-11-27 PROCEDURE — 70486 CT MAXILLOFACIAL W/O DYE: CPT | Performed by: RADIOLOGY

## 2024-11-27 RX ORDER — TRIAMCINOLONE ACETONIDE 1 MG/G
OINTMENT TOPICAL
COMMUNITY
Start: 2024-11-19 | End: 2024-11-27 | Stop reason: HOSPADM

## 2024-11-27 RX ORDER — ESTRADIOL 10 UG/1
INSERT VAGINAL
COMMUNITY
Start: 2024-11-20 | End: 2024-11-27 | Stop reason: HOSPADM

## 2024-11-27 NOTE — TELEPHONE ENCOUNTER
Writer received call from pt confirming that she received writer message about change of location for surgery on 11/29/24.      Debra German RN   Pt is able to actively abduct/adduct bilateral shoulders against resistance and able actively flex/extend all joints of bilateral upper extremities, pt is ambulating with normal gaits and jump up and down without tenderness/no deformity, pain or tenderness, no restriction of movement

## 2024-11-27 NOTE — TELEPHONE ENCOUNTER
Writer LVM for pt stating that pts surgery is being switched from the [facility] to the [facility]. Writer stated that the pre op nurse from the [facility] will be contacting pt. Writer left direct line for pt to call with questions.      Debra German RN

## 2024-11-28 RX ORDER — OXYCODONE HYDROCHLORIDE 5 MG/1
5 TABLET ORAL EVERY 6 HOURS PRN
Qty: 12 TABLET | Refills: 0 | Status: CANCELLED | OUTPATIENT
Start: 2024-11-28 | End: 2024-12-01

## 2024-11-28 NOTE — DISCHARGE INSTRUCTIONS
"Dr. Rodriguez's Surgical Discharge Instructions    1. Start rinsing the nasal cavities with ocean saline spray as needed for congestion and nasal saline rinse 2 or more times daily the day after surgery  2. Take medications as prescribed.    3. You have been prescribed a narcotic pain medication (oxycodone) to take as needed for pain not controlled with tylenol and a nasal saline spray. An antibiotic will be prescribed if there was evidence of infection during surgery and cultures were obtained at the time of surgery.  If needed we will also send you home on anti-nausea medication  4. Do not drive or operate machinery while taking narcotic pain medication.   5. For nasal bleeding that is bothersome or excessive, spray afrin (oxymetazoline) nasal spray (four sprays into each nostril) and pinch the tip of the nose closed for 10 minutes. This can be bought over the counter. If you find you have done this four times in one hour and are still having bothersome bleeding, please call your doctor. Sometimes you will be given a bottle of afrin at the time of discharge.  This was used at the end of your surgery in your own nose, so you will find that the bottle is already opened, and may have some small streaks of blood on the tip of the bottle. Please do not be alarmed, as this was used on you, and you alone!  6. There are no activity restrictions after surgery, but please \"listen to your body\". If you feel like you are doing too much, please reduce your activity level. The one restriction I have is that you avoid excessive nose-blowing, as this can lead to nasal bleeding.   7. Please call your doctor for any headache not controlled with pain medication, severe nausea or vomiting, fevers >100.4, changes in mental status, or any other concerning symptoms you may identify.      Arnulfo Rodriguez MD    [facility]  Rhinology  Endoscopic Skull Base Surgery  [facility]  Department of " Otolaryngology - Head & Neck Surgery       [facility]  Home Care Following Anesthesia  For 24 hours after surgery:  Get plenty of rest.  A responsible adult must stay with you for at least 24 hours after you leave the surgery center.  Do not drive or use heavy equipment.  If you have weakness or tingling, don't drive or use heavy equipment until this feeling goes away.   Do not drink alcohol.   Avoid strenuous or risky activities.  Ask for help when climbing stairs.  You may feel lightheaded.  IF so, sit for a few minutes before standing.  Have someone help you get up.   If you have nausea (feel sick to your stomach): Drink only clear liquids such as apple juice, ginger ale, broth or 7-Up.  Rest may also help.  Be sure to drink enough fluids.  Move to a regular diet as you feel able.   You may have a slight fever.  Call the doctor if your fever is over 100 F (37.7 C) (taken under the tongue) or lasts longer than 24 hours.  You may have a dry mouth, a sore throat, muscle aches or trouble sleeping. These should go away after 24 hours.  Do not make important or legal decisions.   It is recommended to avoid smoking.               Tips for taking pain medications  To get the best pain relief possible, remember these points:  Take pain medications as directed, before pain becomes severe.  Pain medication can upset your stomach: taking it with food may help.  Constipation is a common side effect of pain medication. Drink plenty of  fluids.  Eat foods high in fiber. Take a stool softener if recommended by your doctor or pharmacist.  Do not drink alcohol, drive or operate machinery while taking pain medications.  Ask about other ways to control pain, such as with heat, ice or relaxation.    Tylenol/Acetaminophen Consumption    If you feel your pain relief is insufficient, you may take Tylenol/Acetaminophen in addition to your narcotic pain medication.   Be careful not to exceed 4,000 mg  of Tylenol/Acetaminophen in a 24 hour period from all sources.  If you are taking extra strength Tylenol/acetaminophen (500 mg), the maximum dose is 8 tablets in 24 hours.  If you are taking regular strength acetaminophen (325 mg), the maximum dose is 12 tablets in 24 hours.    Call a doctor for any of the following:  Signs of infection (fever, growing tenderness at the surgery site, a large amount of drainage or bleeding, severe pain, foul-smelling drainage, redness, swelling).  It has been over 8 to 10 hours since surgery and you are still not able to urinate (pass water).  Headache for over 24 hours.  Numbness, tingling or weakness the day after surgery (if you had spinal anesthesia).  Signs of Covid-19 infection (temperature over 100 degrees, shortness of breath, cough, loss of taste/smell, generalized body aches, persistent headache, chills, sore throat, nausea/vomiting/diarrhea)  Your doctor is:       Dr. Arnulfo Rodriguez, ENT Otolaryngology: [phone]               Or dial [phone] and ask for the resident on call for:  ENT Otolaryngology  For emergency care, call the:  [facility]:  [phone] (TTY for hearing impaired: [phone])

## 2024-11-29 ENCOUNTER — HOSPITAL ENCOUNTER (OUTPATIENT)
Facility: AMBULATORY SURGERY CENTER | Age: 59
Discharge: HOME | End: 2024-11-29
Attending: STUDENT IN AN ORGANIZED HEALTH CARE EDUCATION/TRAINING PROGRAM | Admitting: STUDENT IN AN ORGANIZED HEALTH CARE EDUCATION/TRAINING PROGRAM
Payer: COMMERCIAL

## 2024-11-29 VITALS
DIASTOLIC BLOOD PRESSURE: 86 MMHG | WEIGHT: 178 LBS | HEART RATE: 80 BPM | HEIGHT: 65 IN | TEMPERATURE: 98.1 F | SYSTOLIC BLOOD PRESSURE: 139 MMHG | BODY MASS INDEX: 29.66 KG/M2 | RESPIRATION RATE: 16 BRPM | OXYGEN SATURATION: 94 %

## 2024-11-29 DIAGNOSIS — J32.4 CHRONIC PANSINUSITIS: Primary | ICD-10-CM

## 2024-11-29 DIAGNOSIS — Z98.890 S/P FESS (FUNCTIONAL ENDOSCOPIC SINUS SURGERY): ICD-10-CM

## 2024-11-29 PROCEDURE — 31276 NSL/SINS NDSC FRNT TISS RMVL: CPT | Mod: LT

## 2024-11-29 PROCEDURE — 31276 NSL/SINS NDSC FRNT TISS RMVL: CPT | Mod: 50 | Performed by: STUDENT IN AN ORGANIZED HEALTH CARE EDUCATION/TRAINING PROGRAM

## 2024-11-29 PROCEDURE — 87070 CULTURE OTHR SPECIMN AEROBIC: CPT | Performed by: STUDENT IN AN ORGANIZED HEALTH CARE EDUCATION/TRAINING PROGRAM

## 2024-11-29 PROCEDURE — 61782 SCAN PROC CRANIAL EXTRA: CPT | Mod: GC | Performed by: STUDENT IN AN ORGANIZED HEALTH CARE EDUCATION/TRAINING PROGRAM

## 2024-11-29 PROCEDURE — 31267 ENDOSCOPY MAXILLARY SINUS: CPT | Mod: 50 | Performed by: STUDENT IN AN ORGANIZED HEALTH CARE EDUCATION/TRAINING PROGRAM

## 2024-11-29 PROCEDURE — 31259 NSL/SINS NDSC SPHN TISS RMVL: CPT | Mod: LT

## 2024-11-29 PROCEDURE — 87075 CULTR BACTERIA EXCEPT BLOOD: CPT | Performed by: STUDENT IN AN ORGANIZED HEALTH CARE EDUCATION/TRAINING PROGRAM

## 2024-11-29 PROCEDURE — 31259 NSL/SINS NDSC SPHN TISS RMVL: CPT | Mod: 50 | Performed by: STUDENT IN AN ORGANIZED HEALTH CARE EDUCATION/TRAINING PROGRAM

## 2024-11-29 PROCEDURE — 87186 SC STD MICRODIL/AGAR DIL: CPT | Performed by: STUDENT IN AN ORGANIZED HEALTH CARE EDUCATION/TRAINING PROGRAM

## 2024-11-29 PROCEDURE — 99000 SPECIMEN HANDLING OFFICE-LAB: CPT | Performed by: PATHOLOGY

## 2024-11-29 PROCEDURE — 31267 ENDOSCOPY MAXILLARY SINUS: CPT | Mod: LT

## 2024-11-29 RX ORDER — FENTANYL CITRATE 50 UG/ML
25 INJECTION, SOLUTION INTRAMUSCULAR; INTRAVENOUS EVERY 5 MIN PRN
Status: DISCONTINUED | OUTPATIENT
Start: 2024-11-29 | End: 2024-11-30 | Stop reason: HOSPADM

## 2024-11-29 RX ORDER — CEFAZOLIN SODIUM 2 G/50ML
2 SOLUTION INTRAVENOUS SEE ADMIN INSTRUCTIONS
Status: DISCONTINUED | OUTPATIENT
Start: 2024-11-29 | End: 2024-11-29 | Stop reason: HOSPADM

## 2024-11-29 RX ORDER — ACETAMINOPHEN 325 MG/1
650 TABLET ORAL EVERY 4 HOURS PRN
Qty: 50 TABLET | Refills: 0 | Status: SHIPPED | OUTPATIENT
Start: 2024-11-29

## 2024-11-29 RX ORDER — NALOXONE HYDROCHLORIDE 0.4 MG/ML
0.1 INJECTION, SOLUTION INTRAMUSCULAR; INTRAVENOUS; SUBCUTANEOUS
Status: DISCONTINUED | OUTPATIENT
Start: 2024-11-29 | End: 2024-11-30 | Stop reason: HOSPADM

## 2024-11-29 RX ORDER — DEXAMETHASONE SODIUM PHOSPHATE 10 MG/ML
4 INJECTION, SOLUTION INTRAMUSCULAR; INTRAVENOUS
Status: DISCONTINUED | OUTPATIENT
Start: 2024-11-29 | End: 2024-11-30 | Stop reason: HOSPADM

## 2024-11-29 RX ORDER — OXYMETAZOLINE HYDROCHLORIDE 0.05 G/100ML
SPRAY NASAL DAILY PRN
Status: DISCONTINUED | OUTPATIENT
Start: 2024-11-29 | End: 2024-11-29 | Stop reason: HOSPADM

## 2024-11-29 RX ORDER — AMOXICILLIN 250 MG
1-2 CAPSULE ORAL 2 TIMES DAILY
Qty: 30 TABLET | Refills: 0 | Status: SHIPPED | OUTPATIENT
Start: 2024-11-29

## 2024-11-29 RX ORDER — ACETAMINOPHEN 325 MG/1
650 TABLET ORAL
Status: DISCONTINUED | OUTPATIENT
Start: 2024-11-29 | End: 2024-11-30 | Stop reason: HOSPADM

## 2024-11-29 RX ORDER — DOXYCYCLINE HYCLATE 100 MG
100 TABLET ORAL 2 TIMES DAILY
Qty: 20 TABLET | Refills: 0 | Status: SHIPPED | OUTPATIENT
Start: 2024-11-29 | End: 2024-12-09

## 2024-11-29 RX ORDER — LABETALOL HYDROCHLORIDE 5 MG/ML
10 INJECTION, SOLUTION INTRAVENOUS
Status: DISCONTINUED | OUTPATIENT
Start: 2024-11-29 | End: 2024-11-30 | Stop reason: HOSPADM

## 2024-11-29 RX ORDER — DEXAMETHASONE SODIUM PHOSPHATE 10 MG/ML
10 INJECTION, SOLUTION INTRAMUSCULAR; INTRAVENOUS ONCE
Status: COMPLETED | OUTPATIENT
Start: 2024-11-29 | End: 2024-11-29

## 2024-11-29 RX ORDER — EPINEPHRINE 1 MG/ML
INJECTION, SOLUTION INTRAMUSCULAR; SUBCUTANEOUS DAILY PRN
Status: DISCONTINUED | OUTPATIENT
Start: 2024-11-29 | End: 2024-11-29 | Stop reason: HOSPADM

## 2024-11-29 RX ORDER — OXYCODONE HYDROCHLORIDE 5 MG/1
5 TABLET ORAL
Status: COMPLETED | OUTPATIENT
Start: 2024-11-29 | End: 2024-11-29

## 2024-11-29 RX ORDER — FENTANYL CITRATE 50 UG/ML
50 INJECTION, SOLUTION INTRAMUSCULAR; INTRAVENOUS EVERY 5 MIN PRN
Status: DISCONTINUED | OUTPATIENT
Start: 2024-11-29 | End: 2024-11-30 | Stop reason: HOSPADM

## 2024-11-29 RX ORDER — TRIAMCINOLONE ACETONIDE 40 MG/ML
INJECTION, SUSPENSION INTRA-ARTICULAR; INTRAMUSCULAR DAILY PRN
Status: DISCONTINUED | OUTPATIENT
Start: 2024-11-29 | End: 2024-11-29 | Stop reason: HOSPADM

## 2024-11-29 RX ORDER — PREDNISONE 20 MG/1
TABLET ORAL
Qty: 21 TABLET | Refills: 0 | Status: SHIPPED | OUTPATIENT
Start: 2024-11-29 | End: 2024-12-13

## 2024-11-29 RX ORDER — CEFAZOLIN SODIUM 2 G/50ML
2 SOLUTION INTRAVENOUS
Status: COMPLETED | OUTPATIENT
Start: 2024-11-29 | End: 2024-11-29

## 2024-11-29 RX ORDER — OXYCODONE HYDROCHLORIDE 5 MG/1
5 TABLET ORAL EVERY 6 HOURS PRN
Qty: 6 TABLET | Refills: 0 | Status: SHIPPED | OUTPATIENT
Start: 2024-11-29 | End: 2024-12-02

## 2024-11-29 RX ORDER — ONDANSETRON 4 MG/1
4 TABLET, ORALLY DISINTEGRATING ORAL EVERY 8 HOURS PRN
Qty: 4 TABLET | Refills: 0 | Status: SHIPPED | OUTPATIENT
Start: 2024-11-29

## 2024-11-29 RX ORDER — ONDANSETRON 4 MG/1
4 TABLET, ORALLY DISINTEGRATING ORAL EVERY 30 MIN PRN
Status: DISCONTINUED | OUTPATIENT
Start: 2024-11-29 | End: 2024-11-30 | Stop reason: HOSPADM

## 2024-11-29 RX ORDER — ONDANSETRON 2 MG/ML
4 INJECTION INTRAMUSCULAR; INTRAVENOUS EVERY 30 MIN PRN
Status: DISCONTINUED | OUTPATIENT
Start: 2024-11-29 | End: 2024-11-30 | Stop reason: HOSPADM

## 2024-11-29 RX ADMIN — FENTANYL CITRATE 25 MCG: 50 INJECTION, SOLUTION INTRAMUSCULAR; INTRAVENOUS at 09:47

## 2024-11-29 RX ADMIN — OXYCODONE HYDROCHLORIDE 5 MG: 5 TABLET ORAL at 09:35

## 2024-11-29 RX ADMIN — ACETAMINOPHEN 650 MG: 325 TABLET ORAL at 10:26

## 2024-11-29 RX ADMIN — FENTANYL CITRATE 25 MCG: 50 INJECTION, SOLUTION INTRAMUSCULAR; INTRAVENOUS at 09:58

## 2024-11-29 NOTE — OP NOTE
DATE OF PROCEDURE:  November 29, 2024   ATTENDING SURGEON: Arnulfo Rodriguez MD  ASSISTANT SURGEON: Mike Miller MD     PREOPERATIVE DIAGNOSES:  1. Chronic maxillary sinusitis  2. Chronic ethmoid sinusitis  3. Chronic sphenoid sinusitis  4. Chronic frontal sinusitis          POSTOPERATIVE DIAGNOSES:  1. Chronic maxillary sinusitis  2. Chronic ethmoid sinusitis  3. Chronic sphenoid sinusitis  4. Chronic frontal sinusitis       PROCEDURES PERFORMED:  1. Bilateral revision endoscopic maxillary antrostomy with tissue removal  2. Bilateral revision endoscopic total sphenoethmoidectomy with tissue removal  3. Bilateral revision endoscopic frontal sinusotomy, 22-modifier  4. Stereotactic image-guided surgery, CPT 87320.     FINDINGS: Significant purulence in the maxillary sinuses and polypoid tissue throughout all the sinuses. Middle turbinate on the right scarred laterally and bilateral frontal sinuses scarred closed.      ANESTHESIA: General.  EBL: 20 cc.  SPECIMEN: none, right maxillary sinus culture sent   COMPLICATIONS: None     INDICATIONS: The patient is a 59 year old female with long-standing chronic rhinosinusitis.  Surgery previously been performed but postoperatively had a robust inflammatory reaction that resulted in significant scarring and persistent symptoms.  Based on this decision was made to proceed with revision surgery.  Physical examination with nasal endoscopy revealed widespread sinonasal edema as well as discolored nasal discharge. Imaging of the sinuses revealed mucoperiosteal thickening in the sinuses. The risks and expectations of endoscopic sinus surgery were reviewed with the patient who agreed to proceed.     JUSTIFICATION FOR CPT 00459: Sphenoid & posterior ethmoid surgery, frontal sinus surgery and skull base disease     PROCEDURE:  After informed consent was given, the patient was placed under satisfactory anesthesia by the anesthesiologist. The nose was topically decongested with pledgets  moistened with 1:1,000 epinephrine. The bed was rotated, and the patient was prepped and draped in the usual fashion. The patient was identified and a surgical time out was performed.      STEREOTACTIC IMAGE-GUIDED SURGERY: The Fusion navigation device was used to perform stereotactic navigation. The tracking instruments were calibrated appropriately with the headgear, and topical landmarks were confirmed to assure accuracy. During the case, the navigation probes were used to confirm our location along the skull base.         ENDOSCOPIC SINUS SURGERY: We first began with the 0  nasal endoscope on the left.  The pledgets were removed and the inferior turbinate was gently infractured and then outfractured with a Speer elevator.  The previously opened maxillary antrostomy was visualized with significant polypoid edema surrounding the opening with purulence within.  Purulence was sent for culture.  The polypoid edema surrounding the opening was completely removed and a Sternberger down-biting forcep was used to open the inferior portion of the maxillary antrostomy.  The previously performed sphenoidotomy was visualized and had scarred off and also had large amount of polypoid tissue extruding through the opening.  This was completely removed with the microdebrider and a up to Kerrison was used to widen the opening of the sphenoid as much as possible including superiorly to the skull base.  At this point a 30 degree camera was introduced into the field and all residual posterior ethmoid cells were carefully removed with a up through cut.  This was done all the way up to the opening of the frontal sinus which was visualized to be scarred and with extensive polypoid inflammation surrounding it.  The medial portion of the middle turbinate was resected all the way up to the opening of the frontal sinus to reduce the risk of further scarring.  The frontal sinus had significant ostia neogenesis which was carefully removed with  the Hosemann punch and Cobra backbiter.  This allowed access into the frontal sinus where significant purulence was encountered.  The opening was carefully widened with the Hosemann punch further.  The 7 degree scope was then introduced in order to visualize the opening of the frontal sinus more.  This was widened as much as possible.  The intersinus septum between the medial and lateral components of the left frontal sinus was excised with a left and right giraffe through cut as high up as a could possibly be done until the 2 openings were contiguous with each other.  A Vectra F dissolvable stent was then placed into the opening of the frontal sinus and impregnated with Kenalog 40.    The nose was then copiously irrigated and all debris and bone chips were removed.  We then turned our attention to the opposing side.  On the right the middle turbinate was found to be scarred completely to the lateral portion and all scar tissue was removed.  Once medialized there was significant purulence in the maxillary sinus and ethmoids which was completely rinsed out from the sinus cavity.  Due to the scarring of the middle turbinate the inferior portion was resected in the anterior portion was removed up to the area of the skull base.  We then performed a similar procedure as the contralateral side with removal of significant polypoid edema of the maxillary sinus, sphenoethmoid cavity, and around the opening of the frontal sinus.  There was similarly significant ostia neogenesis of the frontal sinus which typically scarred off on the side.  This was all carefully removed with the Hosemann and Cobra backbiter.  Once the frontal sinus was completely open and could be visualized into, the vector F stent was placed on the side as well.        Please note use of the 22-modifier for the fronal sinusotomy bilaterally due to extensive inflammatory and osteitis changes requiring increased work and effort approximately twice as long as  what is typically required for this portion of the procedure.     At the termination of the case, hemostasis was assured. Posisep was placed into bilateral sinus cavities. All counts were correct times two. An orogastric tube was used to suction gastric and pharyngeal contents. The patient was then returned to the anesthesiologist for awakening.      I was present for and performed all critical portions of this procedure.

## 2024-11-29 NOTE — BRIEF OP NOTE
[facility]    Brief Operative Note    Pre-operative diagnosis: Chronic pansinusitis [J32.4]  Post-operative diagnosis Same as pre-operative diagnosis    Procedure: Stealth Assisted Bilateral revision maxillary antrostomy, ethmoidectomy, sphenoidotomy, frontal sinusotomy, Bilateral - Nose    Surgeon: Surgeons and Role:     * Arnulfo Rodriguez MD - Primary     * Mike Miller MD - Resident - Assisting  Anesthesia: General   Estimated Blood Loss: 20 ml    Drains: None  Specimens:   ID Type Source Tests Collected by Time Destination   A : LEFT MAXILLARY SINUS CONTENTS Swab Sinus, Maxillary, Left ANAEROBIC BACTERIAL CULTURE ROUTINE, AEROBIC BACTERIAL CULTURE ROUTINE Arnulfo Rodriguez MD 11/29/2024  7:58 AM      Findings:   Purulence within bilateral sinuses. Purulence in left maxillary sinus sent for aerobic and anaerobic culture. Middle turbinate resected bilaterally. Posisep placed in left nasal cavity. Vectra placed in right frontal sinus opening. Hemostasis was achieved .  Complications: None.  Implants:   Implant Name Type Inv. Item Serial No.  Lot No. LRB No. Used Action   VECTRA F     [id number] Bilateral 1 Implanted

## 2024-12-01 LAB
BACTERIA SPEC CULT: ABNORMAL

## 2024-12-02 LAB — BACTERIA SPEC CULT: ABNORMAL

## 2024-12-05 ENCOUNTER — OFFICE VISIT (OUTPATIENT)
Dept: OTOLARYNGOLOGY | Facility: CLINIC | Age: 59
End: 2024-12-05
Payer: COMMERCIAL

## 2024-12-05 ENCOUNTER — MYC MEDICAL ADVICE (OUTPATIENT)
Dept: OTOLARYNGOLOGY | Facility: CLINIC | Age: 59
End: 2024-12-05

## 2024-12-05 VITALS
OXYGEN SATURATION: 98 % | HEIGHT: 65 IN | SYSTOLIC BLOOD PRESSURE: 114 MMHG | DIASTOLIC BLOOD PRESSURE: 75 MMHG | BODY MASS INDEX: 29.62 KG/M2 | HEART RATE: 95 BPM

## 2024-12-05 DIAGNOSIS — J32.4 CHRONIC PANSINUSITIS: Primary | ICD-10-CM

## 2024-12-05 ASSESSMENT — PAIN SCALES - GENERAL: PAINLEVEL_OUTOF10: NO PAIN (0)

## 2024-12-05 NOTE — LETTER
12/5/2024       RE: Peace Boudreaux  [address]  [place] [address]     Dear Colleague,    Thank you for referring your patient, Peace Boudreaux, to the [facility] [place] at . Please see a copy of my visit note below.      [facility]  Return Visit  Encounter date:   December 5, 2024    Chief Complaint:   Follow-up    ID: s/p stealth-assisted bilateral revision endoscopic maxillary antrostomy with tissue removal, bilateral revision endoscopic total sphenoethmoidectomy with tissue removal, and bilateral revision endoscopic frontal sinusotomy 11/29/24    History of Present Illness 4/25/24:   Peace Boudreaux is a 58 year old female who presents for consultation regarding chronic maxillary sinusitis. Patient endorses nasal congestion. Sinus pain/pressure, and productive cough with green discharge. Her symptoms started after john COVID in December of last year. Has been placed on Azithromycin in the past.  She has also had no significant improvements with Flonase, Zyrtec, anti H1, Z-pack, and Navage. She has been advised to take Sudafed occasionally. Has not had a sinus infection prior to this recent episode. Denies history of allergies. Does have albuterol inhaler at home.      Feels that her medications are not effective. Has been on them for greater than 6 weeks.    Interval History 5/30/24:   Peace Boudreaux is a 59 year old female who presents for follow up. She has noticed some slight improvement with the Prednisone.  Unfortunately symptoms recurred soon after her treatment was finished.  Notes persistent postnasal drip that is thick and malodorous.    Interval History 6/17/24:   Peace Boudreaux is a 59 year old female who presents for [facility]10[facility]. Doing well. Congested and with some pain of the front teeth. Otherwise no epistaxis.    Interval History  7/10/24:   Peace Boudreaux is a 59 year old female who [facility]. Has had some return of drainage and increase in morning headaches. Has noticed rancid smell     Interval History 8/21/24:  Returns for third visit. Still with significant drainage and rancid smell. CT demonstrates significant inflammation.    Interval History 9/26/24:   Peace Boudreaux is a 59 year old female who presents for follow up Chronic pansinusitis. Improved on the steroids but symptoms are starting to return now that she's off them again. Primarily congestion and drainage leading to cough.    Interval History 12/5/24:   Peace Boudreaux is a 59 year old female who presents for follow up. She reports she had a large clot the size of her thumb come out after surgery, and she feels as if the area is inflamed. However, her breathing has largely improved. She is taking ibuprofen for pain.      [facility]  DATE OF PROCEDURE:  November 29, 2024   ATTENDING SURGEON: Arnulfo Rodriguez MD  ASSISTANT SURGEON: Mike Miller MD    PREOPERATIVE DIAGNOSES:  1. Chronic maxillary sinusitis  2. Chronic ethmoid sinusitis  3. Chronic sphenoid sinusitis  4. Chronic frontal sinusitis     POSTOPERATIVE DIAGNOSES:  1. Chronic maxillary sinusitis  2. Chronic ethmoid sinusitis  3. Chronic sphenoid sinusitis  4. Chronic frontal sinusitis     PROCEDURES PERFORMED:  1. Bilateral revision endoscopic maxillary antrostomy with tissue removal  2. Bilateral revision endoscopic total sphenoethmoidectomy with tissue removal  3. Bilateral revision endoscopic frontal sinusotomy, 22-modifier  4. Stereotactic image-guided surgery, CPT 57121.     FINDINGS: Significant purulence in the maxillary sinuses and polypoid tissue throughout all the sinuses. Middle turbinate on the right scarred laterally and bilateral frontal sinuses scarred closed.     6/10/24 Dr. Arnulfo Rodriguez  PROCEDURES PERFORMED:  1. Bilateral endoscopic  "maxillary antrostomy with tissue removal  2. Bilateral endoscopic total sphenoethmoidectomy with tissue removal  3. Bilateral endoscopic frontal sinusotomy  4. Bilateral outfracture of the inferior turbinates  5. Septoplasty  6. Stereotactic image-guided surgery      Review of systems: A 14-point review of systems has been conducted and is negative for any notable symptoms, except as dictated in the history of present illness.     Physical Exam:  Vital signs: /75 (BP Location: Left arm, Patient Position: Sitting, Cuff Size: Adult Regular)   Pulse 95   Ht 1.651 m (5' 5\")   LMP 05/08/2012   SpO2 98%   BMI 29.62 kg/m     General Appearance: No acute distress, appropriate demeanor, conversant  Eyes: moist conjunctivae; EOMI; pupils symmetric; visual acuity grossly intact; no proptosis  Head: normocephalic; overall symmetric appearance without deformity  Face: overall symmetric without deformity  Ears: Normal appearance of external ear  Nose: No external deformity  Oral Cavity/oropharynx: Normal appearance of mucosa  Neck: no palpable lymphadenopathy; thyroid without palpable nodules  Lungs: symmetric chest rise; no wheezing  CV: Good distal perfusion; normal hear rate  Extremities: No deformity  Neurologic Exam: Cranial nerves II-XII are grossly intact    Findings  Bilateral: Healing well from endoscopic sinus surgery. Crusting and clot debrided from all sinuses with patent sinuses visible. No overt evidence of underlying infection.    The patient tolerated the procedure well without complication.     Laboratory Review:  11/29/24 Aerobic Bacterial Culture - Left Maxillary Sinus (Final)  1+ Pseudomonas aeruginosa  1+ Pseudomonas aeruginosa  1+ Staphylococcus epidermidis      11/29/24 Anaerobic Bacterial Culture - Left Maxillary Sinus (Preliminary)  2+ Cutibacterium (Propionibacterium) acnes  Susceptibility testing of Cutibacterium (Propionibacterium) species is not done from this source. This organism is " susceptible to penicillin and cefotaxime and most are susceptible to clindamycin.      Assessment/Medical Decision Making:  Peace Boudreaux is a 59 year old female who presents for her initial post-operative appointment s/p stealth-assisted bilateral revision endoscopic maxillary antrostomy with tissue removal, bilateral revision endoscopic total sphenoethmoidectomy with tissue removal, and bilateral revision endoscopic frontal sinusotomy 11/29/24.   Plan:  Complete doxycyline course as prescribed on 11/29/24.  Continue with prednisone as prescribed on 11/29/24.  Prescribed tobramycin to be added to rinse bottle or Navage. Continued with nasal rinses BID x 1 month.  Return to clinic as scheduled for 12/19/24.    Scribe Disclosure:   I, Annie Baker, am serving as a scribe to document services personally performed by Arnulfo Rodriguez MD based on data collection and the provider's statements to me.     Provider Disclosure:  I agree with above History, Review of Systems, Physical exam and Plan. I have reviewed the content of the documentation and have edited it as needed. I have personally performed the services documented here and the documentation accurately represents those services and the decisions I have made.      Electronically signed by:    Arnulfo Rodriguez MD    [facility]  Rhinology, Endoscopic Skull Base Surgery  [facility]  Department of Otolaryngology - Head & Neck Surgery    ~~~~~~~~~~~~~~~~~~~~~~~~~~~~~~~~~~~~~~~~~~~~~~~~~~~~~~~~~~~~~~~~~~~~~~~~~~~~~~~~~~~~~~~~~~~~~~~~~~~~~~~~~~~~~~~~~~~~~~~~~~~~~~~~~~~~~~~    Past Medical History:   Diagnosis Date     ASCUS favor benign 8/2015    colp - neg     Congenital anomalies of other endocrine glands 2004    Cyst on thyroid     LSIL (low grade squamous intraepithelial lesion) on Pap smear 6/2014    neg high risk  HPV        Past Surgical History:   Procedure Laterality Date     OPERATIVE HYSTEROSCOPY WITH MORCELLATOR N/A  7/5/2018    Procedure: OPERATIVE HYSTEROSCOPY WITH MORCELLATOR (SUERO & NEPHEW);  OPERATIVE HYSTEROSCOPY AND POLYPECTOMY WITH SUERO NEPHEW MORCELLATOR AND FLUID MANAGEMENT ;  Surgeon: Gerard Cole MD;  Location: [facility]     OPTICAL TRACKING SYSTEM ENDOSCOPIC SINUS SURGERY Bilateral 6/10/2024    Procedure: Endoscopic bilateral maxillary antrostomy, total ethmoidectomy, sphenoidotomy, frontal sinusotomy, and endo septoplasty;  Surgeon: Arnulfo Rodriguez MD;  Location: [facility] OR     OPTICAL TRACKING SYSTEM ENDOSCOPIC SINUS SURGERY Bilateral 11/29/2024    Procedure: Stealth Assisted Bilateral revision maxillary antrostomy, ethmoidectomy, sphenoidotomy, frontal sinusotomy;  Surgeon: Arnulfo Rodriguez MD;  Location: [facility] OR     THYROID BIOPSY  [address] [place] [address] EXCISE HAND/FOOT NEUROMA  2002        Family History   Problem Relation Age of Onset     Atrial fibrillation Mother         pacemaker     Mitral valve prolapse Mother      Heart Disease Father         pacemaker     Cardiovascular Father         Pacemaker     C.A.D. Father      Anemia Brother      Heart Disease Maternal Grandmother         CHF     C.A.D. Maternal Grandmother      Heart Failure Maternal Grandmother      Gastrointestinal Disease Maternal Grandfather      Alcohol/Drug Maternal Grandfather         Social History     Socioeconomic History     Marital status: Single     Number of children: 0     Years of education: 16   Occupational History     Occupation:      Employer: [employer]   Tobacco Use     Smoking status: Never     Smokeless tobacco: Never   Substance and Sexual Activity     Alcohol use: Yes     Comment: occ     Drug use: No     Sexual activity: Not Currently     Partners: Male     Birth control/protection: Condom   Social History Narrative    Caffeine intake/servings daily - 0    Calcium intake/servings daily - 2-3    Exercise 4-5 times weekly cardio,wt, aerobics daily walks    Seatbelts used - Yes    Guns stored in  the home - No    Self Breast Exam - Yes    Pap test up to date -  Yes    Eye exam up to date -  Yes    Dental exam up to date -  Yes    DEXA scan up to date -  Not Applicable    Flex Sig/Colonoscopy up to date -  Not Applicable    Mammography up to date - No    Immunizations reviewed and up to date - 2002 TD    Abuse: Current or Past (Physical, Sexual or Emotional) - No    Do you feel safe in your environment - Yes    Do you cope well with stress - Yes    Do you suffer from insomnia - No    Last updated by: SOFÍA Lynne RN  5/10/11     Social Drivers of Health     Financial Resource Strain: Low Risk  (4/2/2024)    Financial Resource Strain      Within the past 12 months, have you or your family members you live with been unable to get utilities (heat, electricity) when it was really needed?: No   Food Insecurity: Low Risk  (4/2/2024)    Food Insecurity      Within the past 12 months, did you worry that your food would run out before you got money to buy more?: No      Within the past 12 months, did the food you bought just not last and you didn t have money to get more?: No   Transportation Needs: Low Risk  (4/2/2024)    Transportation Needs      Within the past 12 months, has lack of transportation kept you from medical appointments, getting your medicines, non-medical meetings or appointments, work, or from getting things that you need?: No   Interpersonal Safety: Low Risk  (11/29/2024)    Interpersonal Safety      Do you feel physically and emotionally safe where you currently live?: Yes      Within the past 12 months, have you been hit, slapped, kicked or otherwise physically hurt by someone?: No      Within the past 12 months, have you been humiliated or emotionally abused in other ways by your partner or ex-partner?: No   Housing Stability: Low Risk  (4/2/2024)    Housing Stability      Do you have housing? : Yes      Are you worried about losing your housing?: No         Again, thank you for allowing me to  participate in the care of your patient.      Sincerely,    Arnulfo Rodriguez MD

## 2024-12-05 NOTE — PROGRESS NOTES
[facility]  Return Visit  Encounter date:   December 5, 2024    Chief Complaint:   Follow-up    ID: s/p stealth-assisted bilateral revision endoscopic maxillary antrostomy with tissue removal, bilateral revision endoscopic total sphenoethmoidectomy with tissue removal, and bilateral revision endoscopic frontal sinusotomy 11/29/24    History of Present Illness 4/25/24:   Peace Boudreaux is a 58 year old female who presents for consultation regarding chronic maxillary sinusitis. Patient endorses nasal congestion. Sinus pain/pressure, and productive cough with green discharge. Her symptoms started after john COVID in December of last year. Has been placed on Azithromycin in the past.  She has also had no significant improvements with Flonase, Zyrtec, anti H1, Z-pack, and Navage. She has been advised to take Sudafed occasionally. Has not had a sinus infection prior to this recent episode. Denies history of allergies. Does have albuterol inhaler at home.      Feels that her medications are not effective. Has been on them for greater than 6 weeks.    Interval History 5/30/24:   Peace Boudreaux is a 59 year old female who presents for follow up. She has noticed some slight improvement with the Prednisone.  Unfortunately symptoms recurred soon after her treatment was finished.  Notes persistent postnasal drip that is thick and malodorous.    Interval History 6/17/24:   Peace Boudreaux is a 59 year old female who presents for first follow-up from [facility] on 6/10/24. Doing well. Congested and with some pain of the front teeth. Otherwise no epistaxis.    Interval History 7/10/24:   Peace Boudreaux is a 59 year old female who presents for second follow-up from [facility] on 6/10/24. Has had some return of drainage and increase in morning headaches. Has noticed rancid smell     Interval History 8/21/24:  Returns for third visit. Still with significant drainage and rancid smell. CT demonstrates significant  inflammation.    Interval History 9/26/24:   Peace Boudreaux is a 59 year old female who presents for follow up Chronic pansinusitis. Improved on the steroids but symptoms are starting to return now that she's off them again. Primarily congestion and drainage leading to cough.    Interval History 12/5/24:   Peace Boudreaux is a 59 year old female who presents for follow up. She reports she had a large clot the size of her thumb come out after surgery, and she feels as if the area is inflamed. However, her breathing has largely improved. She is taking ibuprofen for pain.      [place] [facility]  DATE OF PROCEDURE:  November 29, 2024   ATTENDING SURGEON: Arnulfo Rodriguez MD  ASSISTANT SURGEON: Mike Miller MD    PREOPERATIVE DIAGNOSES:  1. Chronic maxillary sinusitis  2. Chronic ethmoid sinusitis  3. Chronic sphenoid sinusitis  4. Chronic frontal sinusitis     POSTOPERATIVE DIAGNOSES:  1. Chronic maxillary sinusitis  2. Chronic ethmoid sinusitis  3. Chronic sphenoid sinusitis  4. Chronic frontal sinusitis     PROCEDURES PERFORMED:  1. Bilateral revision endoscopic maxillary antrostomy with tissue removal  2. Bilateral revision endoscopic total sphenoethmoidectomy with tissue removal  3. Bilateral revision endoscopic frontal sinusotomy, 22-modifier  4. Stereotactic image-guided surgery, CPT 34880.     FINDINGS: Significant purulence in the maxillary sinuses and polypoid tissue throughout all the sinuses. Middle turbinate on the right scarred laterally and bilateral frontal sinuses scarred closed.     6/10/24 Dr. Arnulfo Rodriguez  PROCEDURES PERFORMED:  1. Bilateral endoscopic maxillary antrostomy with tissue removal  2. Bilateral endoscopic total sphenoethmoidectomy with tissue removal  3. Bilateral endoscopic frontal sinusotomy  4. Bilateral outfracture of the inferior turbinates  5. Septoplasty  6. Stereotactic image-guided surgery      Review of systems: A 14-point review of systems has been conducted and is  "negative for any notable symptoms, except as dictated in the history of present illness.     Physical Exam:  Vital signs: /75 (BP Location: Left arm, Patient Position: Sitting, Cuff Size: Adult Regular)   Pulse 95   Ht 1.651 m (5' 5\")   LMP 05/08/2012   SpO2 98%   BMI 29.62 kg/m     General Appearance: No acute distress, appropriate demeanor, conversant  Eyes: moist conjunctivae; EOMI; pupils symmetric; visual acuity grossly intact; no proptosis  Head: normocephalic; overall symmetric appearance without deformity  Face: overall symmetric without deformity  Ears: Normal appearance of external ear  Nose: No external deformity  Oral Cavity/oropharynx: Normal appearance of mucosa  Neck: no palpable lymphadenopathy; thyroid without palpable nodules  Lungs: symmetric chest rise; no wheezing  CV: Good distal perfusion; normal hear rate  Extremities: No deformity  Neurologic Exam: Cranial nerves II-XII are grossly intact    Findings  Bilateral: Healing well from endoscopic sinus surgery. Crusting and clot debrided from all sinuses with patent sinuses visible. No overt evidence of underlying infection.    The patient tolerated the procedure well without complication.     Laboratory Review:  11/29/24 Aerobic Bacterial Culture - Left Maxillary Sinus (Final)  1+ Pseudomonas aeruginosa  1+ Pseudomonas aeruginosa  1+ Staphylococcus epidermidis      11/29/24 Anaerobic Bacterial Culture - Left Maxillary Sinus (Preliminary)  2+ Cutibacterium (Propionibacterium) acnes  Susceptibility testing of Cutibacterium (Propionibacterium) species is not done from this source. This organism is susceptible to penicillin and cefotaxime and most are susceptible to clindamycin.      Assessment/Medical Decision Making:  Peace Boudreaux is a 59 year old female who presents for her initial post-operative appointment s/p stealth-assisted bilateral revision endoscopic maxillary antrostomy with tissue removal, bilateral revision endoscopic " total sphenoethmoidectomy with tissue removal, and bilateral revision endoscopic frontal sinusotomy 11/29/24.   Plan:  Complete doxycyline course as prescribed on 11/29/24.  Continue with prednisone as prescribed on 11/29/24.  Prescribed tobramycin to be added to rinse bottle or Navage. Continued with nasal rinses BID x 1 month.  Return to clinic as scheduled for 12/19/24.    Scribe Disclosure:   I, Annie Baker, am serving as a scribe to document services personally performed by Arnulfo Rodriguez MD based on data collection and the provider's statements to me.     Provider Disclosure:  I agree with above History, Review of Systems, Physical exam and Plan. I have reviewed the content of the documentation and have edited it as needed. I have personally performed the services documented here and the documentation accurately represents those services and the decisions I have made.      Electronically signed by:    Arnulfo Rodriguez MD    [facility]  Rhinology, Endoscopic Skull Base Surgery  [facility]  Department of Otolaryngology - Head & Neck Surgery    ~~~~~~~~~~~~~~~~~~~~~~~~~~~~~~~~~~~~~~~~~~~~~~~~~~~~~~~~~~~~~~~~~~~~~~~~~~~~~~~~~~~~~~~~~~~~~~~~~~~~~~~~~~~~~~~~~~~~~~~~~~~~~~~~~~~~~~~    Past Medical History:   Diagnosis Date    ASCUS favor benign 8/2015    colp - neg    Congenital anomalies of other endocrine glands 2004    Cyst on thyroid    LSIL (low grade squamous intraepithelial lesion) on Pap smear 6/2014    neg high risk  HPV        Past Surgical History:   Procedure Laterality Date    OPERATIVE HYSTEROSCOPY WITH MORCELLATOR N/A 7/5/2018    Procedure: OPERATIVE HYSTEROSCOPY WITH MORCELLATOR (SUERO & NEPHEW);  OPERATIVE HYSTEROSCOPY AND POLYPECTOMY WITH SUERO NEPHEW MORCELLATOR AND FLUID MANAGEMENT ;  Surgeon: Gerard Cole MD;  Location: [facility]    OPTICAL TRACKING SYSTEM ENDOSCOPIC SINUS SURGERY Bilateral 6/10/2024    Procedure: Endoscopic bilateral  maxillary antrostomy, total ethmoidectomy, sphenoidotomy, frontal sinusotomy, and endo septoplasty;  Surgeon: Arnulfo Rodriguez MD;  Location: [facility]    OPTICAL TRACKING SYSTEM ENDOSCOPIC SINUS SURGERY Bilateral 11/29/2024    Procedure: Stealth Assisted Bilateral revision maxillary antrostomy, ethmoidectomy, sphenoidotomy, frontal sinusotomy;  Surgeon: Arnulfo Rodriguez MD;  Location: [facility] OR    THYROID BIOPSY  2004    [facility] EXCISE HAND/FOOT NEUROMA  2002        Family History   Problem Relation Age of Onset    Atrial fibrillation Mother         pacemaker    Mitral valve prolapse Mother     Heart Disease Father         pacemaker    Cardiovascular Father         Pacemaker    C.A.D. Father     Anemia Brother     Heart Disease Maternal Grandmother         CHF    C.A.D. Maternal Grandmother     Heart Failure Maternal Grandmother     Gastrointestinal Disease Maternal Grandfather     Alcohol/Drug Maternal Grandfather         Social History     Socioeconomic History    Marital status: Single    Number of children: 0    Years of education: 16   Occupational History    Occupation:      Employer: [employer]   Tobacco Use    Smoking status: Never    Smokeless tobacco: Never   Substance and Sexual Activity    Alcohol use: Yes     Comment: occ    Drug use: No    Sexual activity: Not Currently     Partners: Male     Birth control/protection: Condom   Social History Narrative    Caffeine intake/servings daily - 0    Calcium intake/servings daily - 2-3    Exercise 4-5 times weekly cardio,wt, aerobics daily walks    Seatbelts used - Yes    Guns stored in the home - No    Self Breast Exam - Yes    Pap test up to date -  Yes    Eye exam up to date -  Yes    Dental exam up to date -  Yes    DEXA scan up to date -  Not Applicable    Flex Sig/Colonoscopy up to date -  Not Applicable    Mammography up to date - No    Immunizations reviewed and up to date - 2002 TD    Abuse: Current or Past (Physical, Sexual or Emotional) - No     Do you feel safe in your environment - Yes    Do you cope well with stress - Yes    Do you suffer from insomnia - No    Last updated by: SOFÍA Lynne RN  5/10/11     Social Drivers of Health     Financial Resource Strain: Low Risk  (4/2/2024)    Financial Resource Strain     Within the past 12 months, have you or your family members you live with been unable to get utilities (heat, electricity) when it was really needed?: No   Food Insecurity: Low Risk  (4/2/2024)    Food Insecurity     Within the past 12 months, did you worry that your food would run out before you got money to buy more?: No     Within the past 12 months, did the food you bought just not last and you didn t have money to get more?: No   Transportation Needs: Low Risk  (4/2/2024)    Transportation Needs     Within the past 12 months, has lack of transportation kept you from medical appointments, getting your medicines, non-medical meetings or appointments, work, or from getting things that you need?: No   Interpersonal Safety: Low Risk  (11/29/2024)    Interpersonal Safety     Do you feel physically and emotionally safe where you currently live?: Yes     Within the past 12 months, have you been hit, slapped, kicked or otherwise physically hurt by someone?: No     Within the past 12 months, have you been humiliated or emotionally abused in other ways by your partner or ex-partner?: No   Housing Stability: Low Risk  (4/2/2024)    Housing Stability     Do you have housing? : Yes     Are you worried about losing your housing?: No

## 2024-12-05 NOTE — NURSING NOTE
"Chief Complaint   Patient presents with    RECHECK   Blood pressure 114/75, pulse 95, height 1.651 m (5' 5\"), last menstrual period 05/08/2012, SpO2 98%, not currently breastfeeding. Drake Aly, EMT    "

## 2024-12-05 NOTE — PATIENT INSTRUCTIONS
You were seen in the ENT Clinic today by Dr. Rodriguez. If you have any questions or concerns after your appointment, please contact us (see below)       2.   The following recommendations have been made based upon your appointment today:   -a prescription for tobramycin sinus rinses [facility], they will reach out to you on where to send the medication      3.   Plan to return to the ENT clinic on 12/19/24           How to Contact Us:  Send a [employer] message to your provider. Our team will respond to you via [employer]. Occasionally, we will need to call you to get further information.  For urgent matters (Monday-Friday), call the ENT Clinic: [phone] and speak with a call center team member - they will route your call appropriately.   If you'd like to speak directly with a nurse, please find our contact information below. We do our best to check voicemail frequently throughout the day, and will work to call you back within 1-2 days. For urgent matters, please use the general clinic phone numbers listed above.     Debra OCONNOR RN  Direct: [phone]  Alice GREEN LPN  Direct: [phone]         [facility]  Department of Otolaryngology

## 2024-12-07 LAB
BACTERIA SPEC CULT: ABNORMAL
BACTERIA SPEC CULT: ABNORMAL

## 2024-12-09 ENCOUNTER — TELEPHONE (OUTPATIENT)
Dept: VASCULAR SURGERY | Facility: CLINIC | Age: 59
End: 2024-12-09
Payer: COMMERCIAL

## 2024-12-09 NOTE — TELEPHONE ENCOUNTER
Left Voicemail (1st Attempt) and Sent Mychart (1st Attempt) for the patient to call back and schedule the following:Venous (peripheral) insufficiency.     Location: [facility]  Provider: the Interventional Radiology Physician Assistant Clinic  Appointment type: New Vascular Patient  Appointment mode: In Person  Return date: Next Available     Specialty phone number: [phone] or [phone]    Referred to [facility]: No    Is Imaging Needed: No    Additional Notes: BIANKA Lay on 12/9/2024 at 5:10 PM

## 2024-12-11 ENCOUNTER — DOCUMENTATION ONLY (OUTPATIENT)
Dept: OTHER | Facility: CLINIC | Age: 59
End: 2024-12-11
Payer: COMMERCIAL

## 2024-12-12 ENCOUNTER — OFFICE VISIT (OUTPATIENT)
Dept: VASCULAR SURGERY | Facility: CLINIC | Age: 59
End: 2024-12-12
Payer: COMMERCIAL

## 2024-12-12 VITALS — SYSTOLIC BLOOD PRESSURE: 108 MMHG | HEART RATE: 90 BPM | OXYGEN SATURATION: 97 % | DIASTOLIC BLOOD PRESSURE: 77 MMHG

## 2024-12-12 DIAGNOSIS — I87.2 VENOUS (PERIPHERAL) INSUFFICIENCY: ICD-10-CM

## 2024-12-12 NOTE — PROGRESS NOTES
"    VASCULAR AND INTERVENTIONAL RADIOLOGY   CLINIC NOTE  Dec 12, 2024    Referring Provider:   Manuel Logan MD  [address] [place] [id number]     Patient Name:  Peace Boudreaux  Medical Record Number:  [id number]  YOB: 1965  Reason for Visit:  Varicose Vein Evaluation    ASSESSMENT:      -Elsi perrin started on crestor last spring due to elevated cholesterol (wanted to stop due to body aches)  -in the summer, on plane, when sitting, is an atheletic  on feet all day, has bilateral leg sweeling, will wear at bed \"doesn't like canckles\"...doesn't have swollen legs in the winter (when it is cold), notice more in the summer  -does say compression stockings help  -15 years ago had slerosing treatment due to swelling and bulging (at vein clinic up in [place]) -- feels like it was more cosmetic or preventative  -mom had varicose veins and stripped the veins (moms hurt, would bleed), she is here now because didn't want to get to that point  -her legs have never hurt  -swelling bilaterally is equal  -    PLAN:  -Venous Competency Ultrasound***  -3 month trial of medical grade compression (20-30 mm Hg).  Wear daily and take off while sleeping.  -Skin care  -Leg elevation at least 1 hour/day  -Encouraged walking at least 20 minutes per day  -Counseled on weight loss and keeping weight stable.    -Discussed with patient that IR intervention would help with the leg symptoms and not help the appearance of varicose veins.   -Patients with refractory symptom management should undergo comprehensive lower extremity duplex exam (with showing demonstration of venous reflux)  -Ongoing symptoms warrant return visit with Interventional Radiologist/[facility] clinic to discuss treatment options.  ***IR referral for [facility] in 3 months sent.    -All of the patient's questions were answered to their " satisfaction.    ====================================================================================    HISTORY OF PRESENT ILLNESS:  Peace Boudreaux is a 59 year old English speaking female with history of     -concern from a family history of heart issues  -in a prevention mode of heart health  -wanting to be better   -walks 2 miles   -plans to get a calcium ct scan  -don't smoke,       HASTI symptoms  Heaviness Y  Aching N  Swelling Y  Throbbing N  Itching N  Restless legs  Improves with elevation  Compression stockings use  Do they help?  How long have you worn?  Prior thrombosis  DVT or SVT  Prior vein treatments  Prior trauma/surgery to legs/pelvis  Prior lines in the groin    Onset: When did the pain start?  Location: Where is the pain?  Duration: How long has the pain been going on?  Quality: What does the pain feel like?  Aggravating factors: What brings on the pain? What makes it better?   Radiating: Does the pain travel to another area of the body?  Severity: How intense is the pain on a scale of 1-10  Occupation?  Is there Prolonged standing or sitting?  Has it limited your ability to exercise or work?  Family history?      Patient denies pain, leg heaviness, aching, swelling, skin dryness, tightness, bleeding itching, skin irritation,  history of DVT/PE, trauma to legs, smoking, rest pain, numbness/tingling fever, chest pain or SOB, wounds in the legs or feet, (pelvic congestion; pelvic pain, dyspareunia, urinary or GI symptoms), or muscle cramps.     Risk Factors: Venous claudication, which is a severe deep pain and tightness typically in the thigh muscles with vigorous exercise, can occur in the setting of acute, severe venous obstruction or longstanding venous obstruction.  *Visible clinical signs of chronic venous disorders in increasing severity include dilated veins (eg, telangiectasia, varicose veins), leg edema, skin changes, and skin ulceration. The prevalence of symptoms and clinical signs  of venous disease correlates with the presence of venous reflux (superficial, deep venous obstruction) identified on duplex ultrasound The most common risk factors include advancing age, family history of venous disease, increased body mass index, smoking, a history of lower extremity trauma, prior venous thrombosis, and, in women, pregnancy.     PAST MEDICAL HISTORY:  Past Medical History:   Diagnosis Date    ASCUS favor benign 8/2015    colp - neg    Congenital anomalies of other endocrine glands 2004    Cyst on thyroid    LSIL (low grade squamous intraepithelial lesion) on Pap smear 6/2014    neg high risk  HPV       MEDICATIONS:  Current Outpatient Medications   Medication Sig Dispense Refill    acetaminophen (TYLENOL) 325 MG tablet Take 2 tablets (650 mg) by mouth every 4 hours as needed for mild pain. 50 tablet 0    COENZYME Q-10 PO       COMPOUNDED NON-CONTROLLED SUBSTANCE (CMPD RX) - PHARMACY TO MIX COMPOUNDED MEDICATION Open Tobramycin capsule and empty contents into 240 ml of nasal saline mixture. Rinse each nasal cavity with 120 ml of mixture twice daily. 60 capsule 0    docusate sodium (COLACE) 100 MG capsule Take 1 capsule (100 mg) by mouth 2 times daily as needed for constipation 20 capsule 0    FIBER PO  (Patient not taking: Reported on 11/27/2024)      Multiple Vitamins-Minerals (WOMENS MULTIVITAMIN PLUS PO) Take  by mouth.      ondansetron (ZOFRAN ODT) 4 MG ODT tab Take 1 tablet (4 mg) by mouth every 8 hours as needed for nausea. 4 tablet 0    predniSONE (DELTASONE) 20 MG tablet Take 2 tablets (40 mg) by mouth daily for 7 days, THEN 1 tablet (20 mg) daily for 7 days. 21 tablet 0    senna-docusate (SENOKOT-S/PERICOLACE) 8.6-50 MG tablet Take 1-2 tablets by mouth 2 times daily. 30 tablet 0    Turmeric (QC TUMERIC COMPLEX PO)       UNABLE TO FIND MEDICATION NAME: william       No current facility-administered medications for this visit.       ALLERGIES:  No Active Allergies    FAMILY  HISTORY:  Family History   Problem Relation Age of Onset    Atrial fibrillation Mother         pacemaker    Mitral valve prolapse Mother     Heart Disease Father         pacemaker    Cardiovascular Father         Pacemaker    C.A.D. Father     Anemia Brother     Heart Disease Maternal Grandmother         CHF    C.A.D. Maternal Grandmother     Heart Failure Maternal Grandmother     Gastrointestinal Disease Maternal Grandfather     Alcohol/Drug Maternal Grandfather        PAST SURGICAL HISTORY:  Past Surgical History:   Procedure Laterality Date    OPERATIVE HYSTEROSCOPY WITH MORCELLATOR N/A 7/5/2018    Procedure: OPERATIVE HYSTEROSCOPY WITH MORCELLATOR (SUERO & NEPHEW);  OPERATIVE HYSTEROSCOPY AND POLYPECTOMY WITH SUERO NEPHEW MORCELLATOR AND FLUID MANAGEMENT ;  Surgeon: Gerard Cole MD;  Location: [facility]    OPTICAL TRACKING SYSTEM ENDOSCOPIC SINUS SURGERY Bilateral 6/10/2024    Procedure: Endoscopic bilateral maxillary antrostomy, total ethmoidectomy, sphenoidotomy, frontal sinusotomy, and endo septoplasty;  Surgeon: Arnulfo Rodriguez MD;  Location: [facility]    OPTICAL TRACKING SYSTEM ENDOSCOPIC SINUS SURGERY Bilateral 11/29/2024    Procedure: Stealth Assisted Bilateral revision maxillary antrostomy, ethmoidectomy, sphenoidotomy, frontal sinusotomy;  Surgeon: Arnulfo Rodriguez MD;  Location: [facility] OR    THYROID BIOPSY  [address] [place] [address] EXCISE HAND/FOOT NEUROMA  2002       SOCIAL HISTORY:  Social History     Tobacco Use    Smoking status: Never    Smokeless tobacco: Never   Substance Use Topics    Alcohol use: Yes     Comment: occ    Drug use: No       PHYSICAL EXAMINATION:  [facility] 05/08/2012   General:  alert and oriented in NAD  Lower Extremity Exam: warm, non tender to palpation bilaterally. ***edema.  Distal pulses ***palpable.  ***No wounds appreciated.  ***Scattered telangectasia (spider veins), Reticular veins    Phlebolymphadema, chronic venostasis changes, skin changes, corona phlebectasia,        PERTINENT IMAGING:  US VENOUS COMPETENCY BILATERAL     Narrative & Impression   ULTRASOUND VENOUS COMPETENCY BILATERAL 11/12/2024 8:21 AM     CLINICAL HISTORY: Leg swelling..      COMPARISON: None available.     REFERRING PROVIDER: PRESLEY THOMPSON A     Technique: B-mode (grayscale) and Duplex Doppler ultrasound of the  lower extremity veins (superficial and deep), including incompetency  reflux time and compression for thrombus. Additional Duplex doppler  assessment of the PTA and LEILANI at the ankles.      Deep incompetency exam performed in reverse Trendelenburg.     Superficial incompetency exam performed in reverse Trendelenburg.     Venous Competency Diagnostic Criteria Adopted 11/29/11.     Venous competency criteria defining abnormal reflux duration:  Femoral - popliteal reflux > 1.0 sec.  Deep femoral, deep calf veins, and superficial vein reflux > 0.5 sec.   vein reflux > 0.35 sec.     Supporting document: J Vasc Surg 2003; 38:793-8. Definition of reflux  in lower extremity veins.     Findings:      Right ankle:   Posterior Tibial artery waveform: triphasic  Anterior Tibial artery waveform: triphasic     Left ankle:  Posterior Tibial artery waveform: triphasic  Anterior Tibial artery waveform: triphasic     Right Lower Extremity:      Duplex Evaluation for Deep Vein Thrombus (which includes CFV, FV,  popliteal vein, and posterior tibial veins): Negative.     Common femoral vein: Incompetent above the saphenofemoral junction  with Valsalva.     Deep femoral vein: Competent     Femoral vein:      proximal thigh: Competent      mid thigh: Competent      distal thigh: Competent     Popliteal vein: Competent     Posterior tibial veins: fully compressible  Peroneal tibial veins: fully compressible     Left lower extremity:     Duplex Evaluation for Deep Vein Thrombus (which includes CFV, FV,  popliteal vein, and posterior tibial veins): Negative.      Common femoral vein: Incompetent above the  saphenofemoral junction  with Valsalva.     Deep femoral vein: Competent     Femoral vein:      proximal thigh: Competent      mid thigh: Competent      distal thigh: Competent     Popliteal vein: Competent     Posterior tibial veins: fully compressible  Peroneal tibial veins: fully compressible     Superficial Venous Incompetency Study:        Right Lower Extremity:      Duplex Evaluation for Superficial Vein Thrombus (which includes GSV,  SSV, AASV, and PASV): Negative.      Anterior accessory saphenous (AASV): Competent     Posterior accessory saphenous (PASV): Not visualized     Great saphenous vein (GSV)      sapheno-femoral junction: Incompetent with Valsalva.      prox thigh: Competent      mid thigh: Competent      dist thigh: Not visualized       knee: Not visualized      prox calf: Incompetent      mid calf: Incompetent      ankle: Competent     Vein of Giacomini (V of G):      distal thigh: Competent      mid thigh: Competent     Small saphenous vein:      popliteal fossa: Competent      prox calf: Competent      mid calf: Competent      distal calf: Competent     Diameters of superficial veins:     AASV: Diameter 3.5 mm     GSV SFJ: Diameter* 7.2 mm  GSV prox thigh*: Diameter 2.3 mm      GSV knee*: Not visualized  GSV Prox Calf: Diameter 3.0 mm  GSV Mid Calf: Diameter 2.6 mm     SSV popliteal fossa*: Diameter 3.2 mm     Vein of Giacomini extends into the mid thigh.     Great saphenous vein becomes a competent tortuous superficial branch  in the mid thigh. Great saphenous vein not visualized in the mid thigh  and knee.     Incompetent varicose vein from the great saphenous vein in the  proximal thigh measures 3.6 mm in diameter.     No incompetent  vein demonstrated.     Left lower extremity:     Duplex Evaluation for Superficial Vein Thrombus (which includes GSV,  SSV, AASV, and PASV): Negative.     Anterior accessory saphenous (AASV): Not visualized     Posterior accessory saphenous (PASV):  Not visualized     Great saphenous vein (GSV)      sapheno-femoral junction: Incompetent with Valsalva      prox thigh: Not visualized      mid thigh: Not visualized      dist thigh: Not visualized       knee: Not visualized      prox calf: Not visualized      mid calf: Not visualized      ankle: Not visualized     Vein of Giacomini (V of G):      distal thigh: Competent      mid thigh: Competent     Small saphenous vein:      popliteal fossa: Competent      prox calf: Competent      mid calf: Competent      distal calf: Competent     Diameters of superficial veins:     GSV SFJ: Diameter* 5.7 mm  GSV prox thigh*: Not visualized     GSV knee*: Not visualized     SSV popliteal fossa*: Diameter 2.9 mm     Vein of Giacomini extends into the mid thigh.     Great saphenous vein becomes a cluster of incompetent with Valsalva  superficial veins in the proximal thigh. Great saphenous vein not  visualized inferiorly.     No incompetent  vein demonstrated.                                                                      IMPRESSION:     1. RIGHT LEG:       A. No superficial or deep venous thrombosis demonstrated.       B. Common femoral vein incompetent above the saphenofemoral  junction with Valsalva.       C. Great saphenous vein incompetent at the saphenofemoral  junction with Valsalva and in the proximal and mid calf.       D. Great saphenous vein becomes a competent superficial tortuous  branch in the mid thigh. Great saphenous vein not visualized in the  mid thigh and knee.        E. Incompetent varicose vein from the great saphenous vein in the  proximal thigh measures 3.6 mm in diameter.       F. No incompetent  vein demonstrated.     2. LEFT LEG:       A. No superficial or deep venous thrombosis demonstrated.       B. Common femoral vein incompetent above the saphenofemoral  junction with Valsalva.       C. Great saphenous vein incompetent in at the saphenofemoral  junction with Valsalva.        "D. Great saphenous vein becomes a cluster of incompetent with  Valsalva superficial veins in the proximal thigh. Great saphenous vein  not visualized inferiorly.       E. No incompetent  vein demonstrated.     Reference: \"Duplex Ultrasound in the Diagnosis of Lower-Extremity Deep  Venous Thrombosis\"- Carey Lee MD, S; John Trejo MD  (Circulation. 2014;129:917-921. http://circ.ahajournals.org)     MD Luli ORLANDO PA-C  Interventional Radiology  [phone] (IR RN triage)    CC:  Patient Care Team  Patient Care Team:  Christopher Hernandez MD as PCP - General (Family Medicine)  Jessica Kowalski, RN as Specialty Care Coordinator (Cardiology)  Elsi Lew APRN CNP as Assigned Heart and Vascular Provider  Arnulfo Rodriguez MD as Assigned Surgical Provider  Manuel Logan MD as MD (Cardiovascular Disease)  Cal Dillon MD as Assigned PCP  Palak Trimble, RN as Specialty Care Coordinator (Cardiology)        ***apply 45 minutes level of service.  ***associate diagnosis      "

## 2024-12-18 ENCOUNTER — MYC MEDICAL ADVICE (OUTPATIENT)
Dept: OTOLARYNGOLOGY | Facility: CLINIC | Age: 59
End: 2024-12-18
Payer: COMMERCIAL

## 2024-12-19 ENCOUNTER — OFFICE VISIT (OUTPATIENT)
Dept: OTOLARYNGOLOGY | Facility: CLINIC | Age: 59
End: 2024-12-19
Payer: COMMERCIAL

## 2024-12-19 VITALS
OXYGEN SATURATION: 98 % | DIASTOLIC BLOOD PRESSURE: 86 MMHG | BODY MASS INDEX: 29.62 KG/M2 | HEART RATE: 103 BPM | HEIGHT: 65 IN | SYSTOLIC BLOOD PRESSURE: 129 MMHG

## 2024-12-19 DIAGNOSIS — J32.4 CHRONIC PANSINUSITIS: ICD-10-CM

## 2024-12-19 RX ORDER — BUDESONIDE 0.5 MG/2ML
INHALANT ORAL
Qty: 120 ML | Refills: 0 | Status: SHIPPED | OUTPATIENT
Start: 2024-12-19

## 2024-12-19 ASSESSMENT — PAIN SCALES - GENERAL: PAINLEVEL_OUTOF10: MILD PAIN (3)

## 2024-12-19 NOTE — PATIENT INSTRUCTIONS
You were seen in the ENT Clinic today by Dr. Rodriguez. If you have any questions or concerns after your appointment, please contact us (see below)       2.   The following recommendations have been made based upon your appointment today:   -refills for your budesonide sinus rinses have been sent to your pharmacy      3.   Plan to return to the ENT clinic 6-8 weeks           How to Contact Us:  Send a [employer] message to your provider. Our team will respond to you via [employer]. Occasionally, we will need to call you to get further information.  For urgent matters (Monday-Friday), call the ENT Clinic: [phone] and speak with a call center team member - they will route your call appropriately.   If you'd like to speak directly with a nurse, please find our contact information below. We do our best to check voicemail frequently throughout the day, and will work to call you back within 1-2 days. For urgent matters, please use the general clinic phone numbers listed above.     Debra OCONNOR RN  Direct: [phone]  Alice GREEN LPN  Direct: [phone]         [facility]  Department of Otolaryngology

## 2024-12-19 NOTE — NURSING NOTE
"Chief Complaint   Patient presents with    RECHECK   Blood pressure 129/86, pulse 103, height 1.651 m (5' 5\"), last menstrual period 05/08/2012, SpO2 98%, not currently breastfeeding. Drake Aly, EMT    "

## 2024-12-19 NOTE — LETTER
12/19/2024       RE: Peace Boudreaux  [address]  [place] [address]     Dear Colleague,    Thank you for referring your patient, Peace Boudreaux, to the [facility] [place] at [facility]. Please see a copy of my visit note below.      [facility]  Return Visit  Encounter date:   December 19, 2024    Chief Complaint:   Follow-up    ID: s/p stealth-assisted bilateral revision endoscopic maxillary antrostomy with tissue removal, bilateral revision endoscopic total sphenoethmoidectomy with tissue removal, and bilateral revision endoscopic frontal sinusotomy 11/29/24     History of Present Illness 4/25/24:   Peace Boudreaux is a 58 year old female who presents for consultation regarding chronic maxillary sinusitis. Patient endorses nasal congestion. Sinus pain/pressure, and productive cough with green discharge. Her symptoms started after john COVID in December of last year. Has been placed on Azithromycin in the past.  She has also had no significant improvements with Flonase, Zyrtec, anti H1, Z-pack, and Navage. She has been advised to take Sudafed occasionally. Has not had a sinus infection prior to this recent episode. Denies history of allergies. Does have albuterol inhaler at home.      Feels that her medications are not effective. Has been on them for greater than 6 weeks.     Interval History 5/30/24:   Peace Boudreaux is a 59 year old female who presents for follow up. She has noticed some slight improvement with the Prednisone.  Unfortunately symptoms recurred soon after her treatment was finished.  Notes persistent postnasal drip that is thick and malodorous.        Interval History 12/5/24:   Peace Boudreaux is a 59 year old female who presents for follow up. She reports she had a large clot the size of her thumb come out after surgery, and she feels as if the area is inflamed.  "However, her breathing has largely improved. She is taking ibuprofen for pain.    Interval History 12/19/24:   Peace Boudreaux is a 59 year old female who presents for follow up. Refer to 12/18/24 [facility] Medical Advice regarding 12/16/24 and 12/17/24 nose bleeds. The 12/16/24 nose bleed occurred while she was at work and resulted in her having to use a nose plug, and the 12/17/24 occurred randomly while she was at dinner. She would like to discuss how to prevent this. She reports she never received antibiotic rinse that was prescribed in 11/2024.    [place] [facility]  11/29/24 Dr. Arnulfo Rodriguez  1. Bilateral revision endoscopic maxillary antrostomy with tissue removal  2. Bilateral revision endoscopic total sphenoethmoidectomy with tissue removal  3. Bilateral revision endoscopic frontal sinusotomy, 22-modifier  4. Stereotactic image-guided surgery     6/10/24 Dr. Arnulfo Rodriguez  1. Bilateral endoscopic maxillary antrostomy with tissue removal  2. Bilateral endoscopic total sphenoethmoidectomy with tissue removal  3. Bilateral endoscopic frontal sinusotomy  4. Bilateral outfracture of the inferior turbinates  5. Septoplasty  6. Stereotactic image-guided surgery      Review of systems: A 14-point review of systems has been conducted and is negative for any notable symptoms, except as dictated in the history of present illness.     Physical Exam:  Vital signs: /86 (BP Location: Left arm, Patient Position: Sitting, Cuff Size: Adult Large)   Pulse 103   Ht 1.651 m (5' 5\")   LMP 05/08/2012   SpO2 98%   BMI 29.62 kg/m     General Appearance: No acute distress, appropriate demeanor, conversant  Eyes: moist conjunctivae; EOMI; pupils symmetric; visual acuity grossly intact; no proptosis  Head: normocephalic; overall symmetric appearance without deformity  Face: overall symmetric without deformity  Ears: Normal appearance of external ear  Nose: No external deformity  Oral Cavity/oropharynx: Normal appearance " of mucosa  Neck: no palpable lymphadenopathy; thyroid without palpable nodules  Lungs: symmetric chest rise; no wheezing  CV: Good distal perfusion; normal hear rate  Extremities: No deformity  Neurologic Exam: Cranial nerves II-XII are grossly intact    Findings  RT: Continues to heal well. Maxillary and ethmoid sinuses with scant inflammation and slightly more inflammation in frontal sinuses, but no evidence of drainage.  LT: Significant clot burden in nasal passage and maxillary sinus completely removed. Maxillary, ethmoid, and frontal sinuses healing well with only residual inflammation. Possible area of prior bleed seen on the stump of the middle turbinate.    The patient tolerated the procedure well without complication.     Assessment/Medical Decision Making:  Peace Boudreaux is a 59 year old female who presents for her 2nd follow-up after revision sinus surgery. We discussed epistaxis can happen after sinus surgery. It is likely a scab fell off, and it happened to be over a blood vessel, which resulted in her two recent moderate-to-severe epistaxis episodes. It is unlikely another episode will occur, but if it does, conservative measures and went to report to the ED were discussed. Recommend restarting steroid nasal rinses to reduce inflammation.  Plan:  Discussed what to do if she experiences a 3rd moderate-to-severe epistaxis episode:  Apply Afrin liberally  If unable to control bleed, report to the ED for packing to be done.  Contact me and will likely have her come in to possibly cauterize the area.  Hold off on nasal rinses until tomorrow or Saturday. Restart budesonide rinses: Empty contents of ampule into 240mL of saline solution and rinse both nasal cavities as instructed twice daily., Disp-120 mL, R-0.. Sent prescription to pharmacy. If these are ineffective, will consider a stronger nasal spray, such as Xhance.  Follow up in 6-8 weeks.    Scribe Disclosure:   Annie COREA, am serving as a  scribe to document services personally performed by Arnulfo Rodriguez MD based on data collection and the provider's statements to me.     Provider Disclosure:  I agree with above History, Review of Systems, Physical exam and Plan. I have reviewed the content of the documentation and have edited it as needed. I have personally performed the services documented here and the documentation accurately represents those services and the decisions I have made.      Electronically signed by:    Arnlufo Rodriguez MD    [facility]  Rhinology, Endoscopic Skull Base Surgery  [facility]  Department of Otolaryngology - Head & Neck Surgery    ~~~~~~~~~~~~~~~~~~~~~~~~~~~~~~~~~~~~~~~~~~~~~~~~~~~~~~~~~~~~~~~~~~~~~~~~~~~~~~~~~~~~~~~~~~~~~~~~~~~~~~~~~~~~~~~~~~~~~~~~~~~~~~~~~~~~~~~    Past Medical History:   Diagnosis Date     ASCUS favor benign 8/2015    colp - neg     Congenital anomalies of other endocrine glands 2004    Cyst on thyroid     LSIL (low grade squamous intraepithelial lesion) on Pap smear 6/2014    neg high risk  HPV        Past Surgical History:   Procedure Laterality Date     OPERATIVE HYSTEROSCOPY WITH MORCELLATOR N/A 7/5/2018    Procedure: OPERATIVE HYSTEROSCOPY WITH MORCELLATOR (SUERO & NEPHEW);  OPERATIVE HYSTEROSCOPY AND POLYPECTOMY WITH SUERO NEPHEW MORCELLATOR AND FLUID MANAGEMENT ;  Surgeon: Gerard Cole MD;  Location: [facility]     OPTICAL TRACKING SYSTEM ENDOSCOPIC SINUS SURGERY Bilateral 6/10/2024    Procedure: Endoscopic bilateral maxillary antrostomy, total ethmoidectomy, sphenoidotomy, frontal sinusotomy, and endo septoplasty;  Surgeon: Arnulfo Rodriguez MD;  Location: [facility] OR     OPTICAL TRACKING SYSTEM ENDOSCOPIC SINUS SURGERY Bilateral 11/29/2024    Procedure: Stealth Assisted Bilateral revision maxillary antrostomy, ethmoidectomy, sphenoidotomy, frontal sinusotomy;  Surgeon: Arnulfo Rodriguez MD;  Location: [facility] OR     THYROID BIOPSY  [address] [place] [address] EXCISE  HAND/FOOT NEUROMA  2002        Family History   Problem Relation Age of Onset     Atrial fibrillation Mother         pacemaker     Mitral valve prolapse Mother      Heart Disease Father         pacemaker     Cardiovascular Father         Pacemaker     C.A.D. Father      Anemia Brother      Heart Disease Maternal Grandmother         CHF     C.A.D. Maternal Grandmother      Heart Failure Maternal Grandmother      Gastrointestinal Disease Maternal Grandfather      Alcohol/Drug Maternal Grandfather         Social History     Socioeconomic History     Marital status: Single     Number of children: 0     Years of education: 16   Occupational History     Occupation:      Employer: [employer]   Tobacco Use     Smoking status: Never     Smokeless tobacco: Never   Substance and Sexual Activity     Alcohol use: Yes     Comment: occ     Drug use: No     Sexual activity: Not Currently     Partners: Male     Birth control/protection: Condom   Social History Narrative    Caffeine intake/servings daily - 0    Calcium intake/servings daily - 2-3    Exercise 4-5 times weekly cardio,wt, aerobics daily walks    Seatbelts used - Yes    Guns stored in the home - No    Self Breast Exam - Yes    Pap test up to date -  Yes    Eye exam up to date -  Yes    Dental exam up to date -  Yes    DEXA scan up to date -  Not Applicable    Flex Sig/Colonoscopy up to date -  Not Applicable    Mammography up to date - No    Immunizations reviewed and up to date - 2002 TD    Abuse: Current or Past (Physical, Sexual or Emotional) - No    Do you feel safe in your environment - Yes    Do you cope well with stress - Yes    Do you suffer from insomnia - No    Last updated by: SOFÍA yLnne RN  5/10/11     Social Drivers of Health     Financial Resource Strain: Low Risk  (4/2/2024)    Financial Resource Strain      Within the past 12 months, have you or your family members you live with been unable to get utilities (heat, electricity) when it  was really needed?: No   Food Insecurity: Low Risk  (4/2/2024)    Food Insecurity      Within the past 12 months, did you worry that your food would run out before you got money to buy more?: No      Within the past 12 months, did the food you bought just not last and you didn t have money to get more?: No   Transportation Needs: Low Risk  (4/2/2024)    Transportation Needs      Within the past 12 months, has lack of transportation kept you from medical appointments, getting your medicines, non-medical meetings or appointments, work, or from getting things that you need?: No   Interpersonal Safety: Low Risk  (11/29/2024)    Interpersonal Safety      Do you feel physically and emotionally safe where you currently live?: Yes      Within the past 12 months, have you been hit, slapped, kicked or otherwise physically hurt by someone?: No      Within the past 12 months, have you been humiliated or emotionally abused in other ways by your partner or ex-partner?: No   Housing Stability: Low Risk  (4/2/2024)    Housing Stability      Do you have housing? : Yes      Are you worried about losing your housing?: No         Again, thank you for allowing me to participate in the care of your patient.      Sincerely,    Arnulfo Rodriguez MD

## 2024-12-19 NOTE — PROGRESS NOTES
[facility]  Return Visit  Encounter date:   December 19, 2024    Chief Complaint:   Follow-up    ID: s/p stealth-assisted bilateral revision endoscopic maxillary antrostomy with tissue removal, bilateral revision endoscopic total sphenoethmoidectomy with tissue removal, and bilateral revision endoscopic frontal sinusotomy 11/29/24     History of Present Illness 4/25/24:   Peace Boudreaux is a 58 year old female who presents for consultation regarding chronic maxillary sinusitis. Patient endorses nasal congestion. Sinus pain/pressure, and productive cough with green discharge. Her symptoms started after john COVID in December of last year. Has been placed on Azithromycin in the past.  She has also had no significant improvements with Flonase, Zyrtec, anti H1, Z-pack, and Navage. She has been advised to take Sudafed occasionally. Has not had a sinus infection prior to this recent episode. Denies history of allergies. Does have albuterol inhaler at home.      Feels that her medications are not effective. Has been on them for greater than 6 weeks.     Interval History 5/30/24:   Peace Boudreaux is a 59 year old female who presents for follow up. She has noticed some slight improvement with the Prednisone.  Unfortunately symptoms recurred soon after her treatment was finished.  Notes persistent postnasal drip that is thick and malodorous.        Interval History 12/5/24:   Peace Boudreaux is a 59 year old female who presents for follow up. She reports she had a large clot the size of her thumb come out after surgery, and she feels as if the area is inflamed. However, her breathing has largely improved. She is taking ibuprofen for pain.    Interval History 12/19/24:   Peace Boudreaux is a 59 year old female who presents for follow up. Refer to 12/18/24 [facility] Medical Advice regarding 12/16/24 and 12/17/24 nose bleeds. The 12/16/24 nose bleed occurred while she was at work and resulted in her  "having to use a nose plug, and the 12/17/24 occurred randomly while she was at dinner. She would like to discuss how to prevent this. She reports she never received antibiotic rinse that was prescribed in 11/2024.    [place] [facility]  11/29/24 Dr. Arnulfo Rodriguez  1. Bilateral revision endoscopic maxillary antrostomy with tissue removal  2. Bilateral revision endoscopic total sphenoethmoidectomy with tissue removal  3. Bilateral revision endoscopic frontal sinusotomy, 22-modifier  4. Stereotactic image-guided surgery     6/10/24 Dr. Arnulfo Rodriguez  1. Bilateral endoscopic maxillary antrostomy with tissue removal  2. Bilateral endoscopic total sphenoethmoidectomy with tissue removal  3. Bilateral endoscopic frontal sinusotomy  4. Bilateral outfracture of the inferior turbinates  5. Septoplasty  6. Stereotactic image-guided surgery      Review of systems: A 14-point review of systems has been conducted and is negative for any notable symptoms, except as dictated in the history of present illness.     Physical Exam:  Vital signs: /86 (BP Location: Left arm, Patient Position: Sitting, Cuff Size: Adult Large)   Pulse 103   Ht 1.651 m (5' 5\")   LMP 05/08/2012   SpO2 98%   BMI 29.62 kg/m     General Appearance: No acute distress, appropriate demeanor, conversant  Eyes: moist conjunctivae; EOMI; pupils symmetric; visual acuity grossly intact; no proptosis  Head: normocephalic; overall symmetric appearance without deformity  Face: overall symmetric without deformity  Ears: Normal appearance of external ear  Nose: No external deformity  Oral Cavity/oropharynx: Normal appearance of mucosa  Neck: no palpable lymphadenopathy; thyroid without palpable nodules  Lungs: symmetric chest rise; no wheezing  CV: Good distal perfusion; normal hear rate  Extremities: No deformity  Neurologic Exam: Cranial nerves II-XII are grossly intact    Findings  RT: Continues to heal well. Maxillary and ethmoid sinuses with scant " inflammation and slightly more inflammation in frontal sinuses, but no evidence of drainage.  LT: Significant clot burden in nasal passage and maxillary sinus completely removed. Maxillary, ethmoid, and frontal sinuses healing well with only residual inflammation. Possible area of prior bleed seen on the stump of the middle turbinate.    The patient tolerated the procedure well without complication.     Assessment/Medical Decision Making:  Peace Boudreaux is a 59 year old female who presents for her 2nd follow-up after revision sinus surgery. We discussed epistaxis can happen after sinus surgery. It is likely a scab fell off, and it happened to be over a blood vessel, which resulted in her two recent moderate-to-severe epistaxis episodes. It is unlikely another episode will occur, but if it does, conservative measures and went to report to the ED were discussed. Recommend restarting steroid nasal rinses to reduce inflammation.  Plan:  Discussed what to do if she experiences a 3rd moderate-to-severe epistaxis episode:  Apply Afrin liberally  If unable to control bleed, report to the ED for packing to be done.  Contact me and will likely have her come in to possibly cauterize the area.  Hold off on nasal rinses until tomorrow or Saturday. Restart budesonide rinses: Empty contents of ampule into 240mL of saline solution and rinse both nasal cavities as instructed twice daily., Disp-120 mL, R-0.. Sent prescription to pharmacy. If these are ineffective, will consider a stronger nasal spray, such as Xhance.  Follow up in 6-8 weeks.    Scribe Disclosure:   I, Annie Baker, am serving as a scribe to document services personally performed by Arnulfo Rodriguez MD based on data collection and the provider's statements to me.     Provider Disclosure:  I agree with above History, Review of Systems, Physical exam and Plan. I have reviewed the content of the documentation and have edited it as needed. I have personally performed the  services documented here and the documentation accurately represents those services and the decisions I have made.      Electronically signed by:    Arnulfo Rodriguez MD    [facility]  Rhinology, Endoscopic Skull Base Surgery  [facility]  Department of Otolaryngology - Head & Neck Surgery    ~~~~~~~~~~~~~~~~~~~~~~~~~~~~~~~~~~~~~~~~~~~~~~~~~~~~~~~~~~~~~~~~~~~~~~~~~~~~~~~~~~~~~~~~~~~~~~~~~~~~~~~~~~~~~~~~~~~~~~~~~~~~~~~~~~~~~~~    Past Medical History:   Diagnosis Date    ASCUS favor benign 8/2015    colp - neg    Congenital anomalies of other endocrine glands 2004    Cyst on thyroid    LSIL (low grade squamous intraepithelial lesion) on Pap smear 6/2014    neg high risk  HPV        Past Surgical History:   Procedure Laterality Date    OPERATIVE HYSTEROSCOPY WITH MORCELLATOR N/A 7/5/2018    Procedure: OPERATIVE HYSTEROSCOPY WITH MORCELLATOR (SUERO & NEPHEW);  OPERATIVE HYSTEROSCOPY AND POLYPECTOMY WITH SUERO NEPHEW MORCELLATOR AND FLUID MANAGEMENT ;  Surgeon: Gerard Cole MD;  Location: [facility]    OPTICAL TRACKING SYSTEM ENDOSCOPIC SINUS SURGERY Bilateral 6/10/2024    Procedure: Endoscopic bilateral maxillary antrostomy, total ethmoidectomy, sphenoidotomy, frontal sinusotomy, and endo septoplasty;  Surgeon: Arnulfo Rodriguez MD;  [place] [facility]    OPTICAL TRACKING SYSTEM ENDOSCOPIC SINUS SURGERY Bilateral 11/29/2024    Procedure: Stealth Assisted Bilateral revision maxillary antrostomy, ethmoidectomy, sphenoidotomy, frontal sinusotomy;  Surgeon: Arnulfo Rodriguez MD;  [place] [facility] [place]    THYROID BIOPSY  2004    [facility] EXCISE HAND/FOOT NEUROMA  2002        Family History   Problem Relation Age of Onset    Atrial fibrillation Mother         pacemaker    Mitral valve prolapse Mother     Heart Disease Father         pacemaker    Cardiovascular Father         Pacemaker    C.A.D. Father     Anemia Brother     Heart Disease Maternal Grandmother         CHF    C.A.D.  Maternal Grandmother     Heart Failure Maternal Grandmother     Gastrointestinal Disease Maternal Grandfather     Alcohol/Drug Maternal Grandfather         Social History     Socioeconomic History    Marital status: Single    Number of children: 0    Years of education: 16   Occupational History    Occupation:      Employer: [employer]   Tobacco Use    Smoking status: Never    Smokeless tobacco: Never   Substance and Sexual Activity    Alcohol use: Yes     Comment: occ    Drug use: No    Sexual activity: Not Currently     Partners: Male     Birth control/protection: Condom   Social History Narrative    Caffeine intake/servings daily - 0    Calcium intake/servings daily - 2-3    Exercise 4-5 times weekly cardio,wt, aerobics daily walks    Seatbelts used - Yes    Guns stored in the home - No    Self Breast Exam - Yes    Pap test up to date -  Yes    Eye exam up to date -  Yes    Dental exam up to date -  Yes    DEXA scan up to date -  Not Applicable    Flex Sig/Colonoscopy up to date -  Not Applicable    Mammography up to date - No    Immunizations reviewed and up to date - 2002 TD    Abuse: Current or Past (Physical, Sexual or Emotional) - No    Do you feel safe in your environment - Yes    Do you cope well with stress - Yes    Do you suffer from insomnia - No    Last updated by: SOFÍA Lynne RN  5/10/11     Social Drivers of Health     Financial Resource Strain: Low Risk  (4/2/2024)    Financial Resource Strain     Within the past 12 months, have you or your family members you live with been unable to get utilities (heat, electricity) when it was really needed?: No   Food Insecurity: Low Risk  (4/2/2024)    Food Insecurity     Within the past 12 months, did you worry that your food would run out before you got money to buy more?: No     Within the past 12 months, did the food you bought just not last and you didn t have money to get more?: No   Transportation Needs: Low Risk  (4/2/2024)     Transportation Needs     Within the past 12 months, has lack of transportation kept you from medical appointments, getting your medicines, non-medical meetings or appointments, work, or from getting things that you need?: No   Interpersonal Safety: Low Risk  (11/29/2024)    Interpersonal Safety     Do you feel physically and emotionally safe where you currently live?: Yes     Within the past 12 months, have you been hit, slapped, kicked or otherwise physically hurt by someone?: No     Within the past 12 months, have you been humiliated or emotionally abused in other ways by your partner or ex-partner?: No   Housing Stability: Low Risk  (4/2/2024)    Housing Stability     Do you have housing? : Yes     Are you worried about losing your housing?: No

## 2024-12-22 ENCOUNTER — HEALTH MAINTENANCE LETTER (OUTPATIENT)
Age: 59
End: 2024-12-22

## 2025-01-03 NOTE — IP AVS SNAPSHOT
MRN:[id number]                      After Visit Summary   7/5/2018    Peace Boudreaux    MRN: [id number]           Thank you!     Thank you for choosing [facility] for your care. Our goal is always to provide you with excellent care. Hearing back from our patients is one way we can continue to improve our services. Please take a few minutes to complete the written survey that you may receive in the mail after you visit with us. Thank you!        Patient Information     Date Of Birth          1965        About your hospital stay     You were admitted on:  July 5, 2018 You last received care in the:  [facility]    You were discharged on:  July 5, 2018       Who to Call     For medical emergencies, please call 911.  For non-urgent questions about your medical care, please call your primary care provider or clinic, [phone]  For questions related to your surgery, please call your surgery clinic        Attending Provider     Provider Specialty    Solo Wilkes MD OB/Gyn       Primary Care Provider Office Phone # Fax #    Christopher Hernandez [phone]      After Care Instructions     Discharge Instructions       Patient to arrange follow up appointment in 3  weeks            Discharge Instructions       Pelvic Rest. No tampons, douching or intercourse for  one  weeks.                  Further instructions from your care team       Same Day Surgery Discharge Instructions for  Sedation and General Anesthesia       It's not unusual to feel dizzy, light-headed or faint for up to 24 hours after surgery or while taking pain medication.  If you have these symptoms: sit for a few minutes before standing and have someone assist you when you get up to walk or use the bathroom.      You should rest and relax for the next 24 hours. We recommend you make arrangements to have an adult stay with you for at least 24 hours after your discharge.  Avoid hazardous and  strenuous activity.      DO NOT DRIVE any vehicle or operate mechanical equipment for 24 hours following the end of your surgery.  Even though you may feel normal, your reactions may be affected by the medication you have received.      Do not drink alcoholic beverages for 24 hours following surgery.       Slowly progress to your regular diet as you feel able. It's not unusual to feel nauseated and/or vomit after receiving anesthesia.  If you develop these symptoms, drink clear liquids (apple juice, ginger ale, broth, 7-up, etc. ) until you feel better.  If your nausea and vomiting persists for 24 hours, please notify your surgeon.        All narcotic pain medications, along with inactivity and anesthesia, can cause constipation. Drinking plenty of liquids and increasing fiber intake will help.      For any questions of a medical nature, call your surgeon.      Do not make important decisions for 24 hours.      If you had general anesthesia, you may have a sore throat for a couple of days related to the breathing tube used during surgery.  You may use Cepacol lozenges to help with this discomfort.  If it worsens or if you develop a fever, contact your surgeon.       If you feel your pain is not well managed with the pain medications prescribed by your surgeon, please contact your surgeon's office to let them know so they can address your concerns.       [facility]  D&C OR Laparoscopy  Discharge Instructions    ACTIVITY:  You may restart normal activities as your abdominal discomfort disappears.  You may expect some discomfort under the ribs and shoulder area for the first 24 hours.  In nearly all cases, this will disappear shortly after the first day.  It is certainly permissible to climb stairs, shower, and do ordinary, quiet activities.  More vigorous activities such as sports, intercourse and work may be resumed in 48-72 hours as seems to befit your situation.    OFFICE VISIT:  Please call a day  or two after your surgery to make an appointment in approximately 2 weeks to discuss the results of your surgery and have your check-up.    VAGINAL DISCHARGE:  You may have some bloody vaginal discharge for as long as one week.  Ordinary tampons may be used after 3-4 days.     INCISIONAL CARE: Keep incisions clean and dry for 24 hours.  You may shower tomorrow.  No bathing or swimming for 3-5 days. If you have steri-strips, they should remain in place 3-5 days, after which they can be removed.      TEMPERATURE:  If you develop a temperature elevation of 101  or higher, please call our office immediately.    RESTRICTIONS:  Due to the effects of general anesthesia, please do not drive a car, drink alcoholic beverages, nor operate complex machinery in the first 24 hours following surgery.    PLEASE FEEL FREE TO CALL OUR OFFICE IF ANY QUESTIONS OR PROBLEMS ARISE.    [facility]    Elbert Rowley M.D.  Solo Wilkes M.D.   Gerard Cole M.D.  SHERRI Don M.D.   Sakshi Vasquez M.D.  Talia Morrow M.D.  BRYSON Malave M.D. SHERRI Segundo M.D.  _______________________________________________________________________________                   [facility]              [address] [facility] [place] [address]  [address]            [facility] [address]  [place] [address]            [phone] (Telephone)                                               [phone] (Telephone)            [phone] (Fax)  [phone] (Fax)            Formerly [facility], [facility]    **If you have questions or concerns about your procedure, Call Dr. Cole at [phone]**        Today you received Toradol, an antiinflammatory medication similar to Ibuprofen.  You should not take other  "antiinflammatory medication, such as Ibuprofen, Motrin, Advil, Aleve, Naprosyn, etc, until 5:00pm.     Oxycodone IR 5mg tablet given at 11:55am    Pending Results     Date and Time Order Name Status Description    7/5/2018 1057 Surgical pathology exam In process     6/12/2018 0000 EKG CARDIAC - HIM SCAN Preliminary             Admission Information     Date & Time Provider Department Dept. Phone    7/5/2018 Solo Wilkes MD [facility] Same Day Surgery [phone]      Your Vitals Were     Blood Pressure Temperature Respirations Height Weight Last Period    126/90 95.9  F (35.5  C) 11 1.664 m (5' 5.5\") 77.1 kg (170 lb) 05/08/2012    Pulse Oximetry BMI (Body Mass Index)                97% 27.86 kg/m2          MyChart Information     Plan Me Up gives you secure access to your electronic health record. If you see a primary care provider, you can also send messages to your care team and make appointments. If you have questions, please call your primary care clinic.  If you do not have a primary care provider, please call [phone] and they will assist you.        Care EveryWhere ID     This is your Care EveryWhere ID. This could be used by other organizations to access your [facility] medical records  [id number]        Equal Access to Services     NIMESH FREEMAN AH: Rivas Sharpe, waaxda luqadaha, qaybta kaalmada adeegyada, dylan clarke. So [facility] [phone].    ATENCIÓN: Si habla español, tiene a atkinson disposición servicios gratuitos de asistencia lingüística. Llame al [phone].    We comply with applicable federal civil rights laws and [place] laws. We do not discriminate on the basis of race, color, national origin, age, disability, sex, sexual orientation, or gender identity.               Review of your medicines      CONTINUE these medicines which have NOT CHANGED        Dose / Directions    calcium carbonate 500 MG tablet   Commonly known as:  OS-BARRIE 500 mg " [place]. Ca        Dose:  500 mg   Take 500 mg by mouth 2 times daily   Refills:  0       WOMENS MULTIVITAMIN PLUS PO        Take  by mouth.   Refills:  0                Protect others around you: Learn how to safely use, store and throw away your medicines at www.disposemymeds.org.             Medication List: This is a list of all your medications and when to take them. Check marks below indicate your daily home schedule. Keep this list as a reference.      Medications           Morning Afternoon Evening Bedtime As Needed    calcium carbonate 500 MG tablet   Commonly known as:  OS-BARRIE 500 mg [place]. Ca   Take 500 mg by mouth 2 times daily                                WOMENS MULTIVITAMIN PLUS PO   Take  by mouth.                                   0

## 2025-01-28 NOTE — PROGRESS NOTES
[facility]  Return Visit  Encounter date:   January 29, 2025    Chief Complaint:   Follow up    ID: s/p stealth-assisted bilateral revision endoscopic maxillary antrostomy with tissue removal, bilateral revision endoscopic total sphenoethmoidectomy with tissue removal, and bilateral revision endoscopic frontal sinusotomy 11/29/24     Interval History 12/5/24:   Peace Boudreaux is a 59 year old female who presents for follow up. She reports she had a large clot the size of her thumb come out after surgery, and she feels as if the area is inflamed. However, her breathing has largely improved. She is taking ibuprofen for pain.     Interval History 12/19/24:   Peace Boudreaux is a 59 year old female who presents for follow up. Refer to 12/18/24 [facility] Medical Advice regarding 12/16/24 and 12/17/24 nose bleeds. The 12/16/24 nose bleed occurred while she was at work and resulted in her having to use a nose plug, and the 12/17/24 occurred randomly while she was at dinner. She would like to discuss how to prevent this. She reports she never received antibiotic rinse that was prescribed in 11/2024.    Interval History 1/29/25:   Peace Boudreaux is a 59 year old female who presents for follow up. Today, she reports she feels very well. She has no concerns or issues to discuss today. She did the navage, but noting was coming out compared to pre surgery. . She had Covid which exacerbated her symptoms. Her sense of smell is still aggected.     [place] Operative History  11/29/24 Dr. Arnulfo Rodriguez  1. Bilateral revision endoscopic maxillary antrostomy with tissue removal  2. Bilateral revision endoscopic total sphenoethmoidectomy with tissue removal  3. Bilateral revision endoscopic frontal sinusotomy, 22-modifier  4. Stereotactic image-guided surgery     6/10/24 Dr. Arunlfo Rodriguez  1. Bilateral endoscopic maxillary antrostomy with tissue removal  2. Bilateral endoscopic total sphenoethmoidectomy with tissue  "removal  3. Bilateral endoscopic frontal sinusotomy  4. Bilateral outfracture of the inferior turbinates  5. Septoplasty  6. Stereotactic image-guided surgery    Review of systems: A 14-point review of systems has been conducted and is negative for any notable symptoms, except as dictated in the history of present illness.     Physical Exam:  Vital signs: BP (!) 124/91 (BP Location: Left arm, Patient Position: Sitting, Cuff Size: Adult Large)   Pulse 87   Ht 1.651 m (5' 5\")   LMP 05/08/2012   SpO2 95%   BMI 29.62 kg/m     General Appearance: No acute distress, appropriate demeanor, conversant  Eyes: moist conjunctivae; EOMI; pupils symmetric; visual acuity grossly intact; no proptosis  Head: normocephalic; overall symmetric appearance without deformity  Face: overall symmetric without deformity  Ears: Normal appearance of external ear  Nose: No external deformity  Oral Cavity/oropharynx: Normal appearance of mucosa  Neck: no palpable lymphadenopathy; thyroid without palpable nodules  Lungs: symmetric chest rise; no wheezing  CV: Good distal perfusion; normal hear rate  Extremities: No deformity  Neurologic Exam: Cranial nerves II-XII are grossly intact      Procedure Note  Procedure performed: Rigid nasal endoscopy  Indication: To evaluate for sinonasal pathology not visualized on routine anterior rhinoscopy  Anesthesia: 4% topical lidocaine with 0.05 % oxymetazoline  Description of procedure: A 30 degree, 3 mm rigid endoscope was inserted into bilateral nasal cavities and the nasal valves, nasal cavity, middle meatus, sphenoethmoid recess, nasopharynx were evaluated for evidence of obstruction, edema, purulence, polyps and/or mass/lesion.      Gary-Valentin Endoscopic Scoring System  Endoscopic observation Right Left   Polyps in middle meatus (0 = absent, 1 = restricted to middle meatus, 2 = Beyond middle meatus) 0 0   Discharge (0 = absent, 1 = thin and clear, 2 = thick, purulent) 0 0   Edema (0 = absent, 1 = " mild-moderate, 2 = moderate-severe) 1 1   Crusting (0 = absent, 1 = mild-moderate, 2 = moderate-severe) 0 0   Scarring (0= absent, 1 = mild-moderate, 2 = moderate-severe) 0 0   Total 1 1       Findings  RT/LT: Completely healed paranasal sinuses with significant improvement in underlying inflammation. No evidence of active infection.     The patient tolerated the procedure well without complication.     Assessment/Medical Decision Making:  Peace Boudreaux is a 59 year old female who presents for follow up. Her physical exam showed that she is completely healed from surgery and symptoms are much improved. Based on how she's feeling she can just do rinses right now. However, if symptoms begin, she should begin the budesonide rinses again. Overall, she is doing great and her symptoms are the best they've been in a year. We will follow up in one year or sooner if needed.      Plan:  Begin sinus rinse once daily.   Follow up with me in one year or as needed.   Refill budesonide rinses (use when symptonms present.).           Scribe Disclosure:   By signing my name below, I, Lianne Berry, attest that this documentation has been prepared under the direction and in the presence of Arnulfo Rodriguez MD.  - Electronically Signed: Lianne Berry 01/29/25     Provider Disclosure:  I agree with above History, Review of Systems, Physical exam and Plan.  I have reviewed the content of the documentation and have edited it as needed. I have personally performed the services documented here and the documentation accurately represents those services and the decisions I have made.      Electronically signed by:    Arnulfo Rodriguez MD    [facility]  Rhinology, Endoscopic Skull Base Surgery  [facility]  Department of Otolaryngology - Head & Neck  Surgery    ~~~~~~~~~~~~~~~~~~~~~~~~~~~~~~~~~~~~~~~~~~~~~~~~~~~~~~~~~~~~~~~~~~~~~~~~~~~~~~~~~~~~~~~~~~~~~~~~~~~~~~~~~~~~~~~~~~~~~~~~~~~~~~~~~~~~~~~    Past Medical History:   Diagnosis Date    ASCUS favor benign 8/2015    colp - neg    Congenital anomalies of other endocrine glands 2004    Cyst on thyroid    LSIL (low grade squamous intraepithelial lesion) on Pap smear 6/2014    neg high risk  HPV        Past Surgical History:   Procedure Laterality Date    OPERATIVE HYSTEROSCOPY WITH MORCELLATOR N/A 7/5/2018    Procedure: OPERATIVE HYSTEROSCOPY WITH MORCELLATOR (SUERO & NEPHRentalutions);  OPERATIVE HYSTEROSCOPY AND POLYPECTOMY WITH SUERO NEPHEW MORCELLATOR AND FLUID MANAGEMENT ;  Surgeon: Gerard Cole MD;  Location: [facility]    OPTICAL TRACKING SYSTEM ENDOSCOPIC SINUS SURGERY Bilateral 6/10/2024    Procedure: Endoscopic bilateral maxillary antrostomy, total ethmoidectomy, sphenoidotomy, frontal sinusotomy, and endo septoplasty;  Surgeon: Arnulfo Rodriguez MD;  Location: [facility]    OPTICAL TRACKING SYSTEM ENDOSCOPIC SINUS SURGERY Bilateral 11/29/2024    Procedure: Stealth Assisted Bilateral revision maxillary antrostomy, ethmoidectomy, sphenoidotomy, frontal sinusotomy;  Surgeon: Arnulfo Rodriguez MD;  Location: [facility] OR    THYROID BIOPSY  2004    [facility] EXCISE HAND/FOOT NEUROMA  2002        Family History   Problem Relation Age of Onset    Atrial fibrillation Mother         pacemaker    Mitral valve prolapse Mother     Heart Disease Father         pacemaker    Cardiovascular Father         Pacemaker    C.A.D. Father     Anemia Brother     Heart Disease Maternal Grandmother         CHF    C.A.D. Maternal Grandmother     Heart Failure Maternal Grandmother     Gastrointestinal Disease Maternal Grandfather     Alcohol/Drug Maternal Grandfather         Social History     Socioeconomic History    Marital status: Single    Number of children: 0    Years of education: 16   Occupational History    Occupation:       Employer: [employer]   Tobacco Use    Smoking status: Never    Smokeless tobacco: Never   Substance and Sexual Activity    Alcohol use: Yes     Comment: occ    Drug use: No    Sexual activity: Not Currently     Partners: Male     Birth control/protection: Condom   Social History Narrative    Caffeine intake/servings daily - 0    Calcium intake/servings daily - 2-3    Exercise 4-5 times weekly cardio,wt, aerobics daily walks    Seatbelts used - Yes    Guns stored in the home - No    Self Breast Exam - Yes    Pap test up to date -  Yes    Eye exam up to date -  Yes    Dental exam up to date -  Yes    DEXA scan up to date -  Not Applicable    Flex Sig/Colonoscopy up to date -  Not Applicable    Mammography up to date - No    Immunizations reviewed and up to date - 2002 TD    Abuse: Current or Past (Physical, Sexual or Emotional) - No    Do you feel safe in your environment - Yes    Do you cope well with stress - Yes    Do you suffer from insomnia - No    Last updated by: SOFÍA Lynne RN  5/10/11     Social Drivers of Health     Financial Resource Strain: Low Risk  (4/2/2024)    Financial Resource Strain     Within the past 12 months, have you or your family members you live with been unable to get utilities (heat, electricity) when it was really needed?: No   Food Insecurity: Low Risk  (4/2/2024)    Food Insecurity     Within the past 12 months, did you worry that your food would run out before you got money to buy more?: No     Within the past 12 months, did the food you bought just not last and you didn t have money to get more?: No   Transportation Needs: Low Risk  (4/2/2024)    Transportation Needs     Within the past 12 months, has lack of transportation kept you from medical appointments, getting your medicines, non-medical meetings or appointments, work, or from getting things that you need?: No   Interpersonal Safety: Low Risk  (11/29/2024)    Interpersonal Safety     Do you feel physically and  emotionally safe where you currently live?: Yes     Within the past 12 months, have you been hit, slapped, kicked or otherwise physically hurt by someone?: No     Within the past 12 months, have you been humiliated or emotionally abused in other ways by your partner or ex-partner?: No   Housing Stability: Low Risk  (4/2/2024)    Housing Stability     Do you have housing? : Yes     Are you worried about losing your housing?: No

## 2025-01-29 ENCOUNTER — OFFICE VISIT (OUTPATIENT)
Dept: OTOLARYNGOLOGY | Facility: CLINIC | Age: 60
End: 2025-01-29
Payer: COMMERCIAL

## 2025-01-29 ENCOUNTER — MYC MEDICAL ADVICE (OUTPATIENT)
Dept: OTOLARYNGOLOGY | Facility: CLINIC | Age: 60
End: 2025-01-29

## 2025-01-29 VITALS
BODY MASS INDEX: 29.62 KG/M2 | HEART RATE: 87 BPM | HEIGHT: 65 IN | OXYGEN SATURATION: 95 % | DIASTOLIC BLOOD PRESSURE: 91 MMHG | SYSTOLIC BLOOD PRESSURE: 124 MMHG

## 2025-01-29 DIAGNOSIS — J32.4 CHRONIC PANSINUSITIS: Primary | ICD-10-CM

## 2025-01-29 RX ORDER — BUDESONIDE 0.5 MG/2ML
INHALANT ORAL
Qty: 120 ML | Refills: 11 | Status: SHIPPED | OUTPATIENT
Start: 2025-01-29

## 2025-01-29 ASSESSMENT — PAIN SCALES - GENERAL: PAINLEVEL_OUTOF10: NO PAIN (0)

## 2025-01-29 NOTE — LETTER
1/29/2025       RE: Peace Boudreaux  [address]  [place] [address]     Dear Colleague,    Thank you for referring your patient, Peace Boudreaux, to the [facility] [place] at [facility]. Please see a copy of my visit note below.      [facility]  Return Visit  Encounter date:   January 29, 2025    Chief Complaint:   Follow up    ID: s/p stealth-assisted bilateral revision endoscopic maxillary antrostomy with tissue removal, bilateral revision endoscopic total sphenoethmoidectomy with tissue removal, and bilateral revision endoscopic frontal sinusotomy 11/29/24     Interval History 12/5/24:   Peace Boudreaux is a 59 year old female who presents for follow up. She reports she had a large clot the size of her thumb come out after surgery, and she feels as if the area is inflamed. However, her breathing has largely improved. She is taking ibuprofen for pain.     Interval History 12/19/24:   Peace Boudreaux is a 59 year old female who presents for follow up. Refer to 12/18/24 [facility] [place] [place] Medical Advice regarding 12/16/24 and 12/17/24 nose bleeds. The 12/16/24 nose bleed occurred while she was at work and resulted in her having to use a nose plug, and the 12/17/24 occurred randomly while she was at dinner. She would like to discuss how to prevent this. She reports she never received antibiotic rinse that was prescribed in 11/2024.    Interval History 1/29/25:   Peace Boudreaux is a 59 year old female who presents for follow up. Today, she reports she feels very well. She has no concerns or issues to discuss today. She did the navage, but noting was coming out compared to pre surgery. . She had Covid which exacerbated her symptoms. Her sense of smell is still aggected.     [facility] [facility]  11/29/24 Dr. Arnulfo Rodriguez  1. Bilateral revision endoscopic maxillary antrostomy with tissue removal  2.  "Bilateral revision endoscopic total sphenoethmoidectomy with tissue removal  3. Bilateral revision endoscopic frontal sinusotomy, 22-modifier  4. Stereotactic image-guided surgery     6/10/24 Dr. Arnulfo Rodriguez  1. Bilateral endoscopic maxillary antrostomy with tissue removal  2. Bilateral endoscopic total sphenoethmoidectomy with tissue removal  3. Bilateral endoscopic frontal sinusotomy  4. Bilateral outfracture of the inferior turbinates  5. Septoplasty  6. Stereotactic image-guided surgery    Review of systems: A 14-point review of systems has been conducted and is negative for any notable symptoms, except as dictated in the history of present illness.     Physical Exam:  Vital signs: BP (!) 124/91 (BP Location: Left arm, Patient Position: Sitting, Cuff Size: Adult Large)   Pulse 87   Ht 1.651 m (5' 5\")   LMP 05/08/2012   SpO2 95%   BMI 29.62 kg/m     General Appearance: No acute distress, appropriate demeanor, conversant  Eyes: moist conjunctivae; EOMI; pupils symmetric; visual acuity grossly intact; no proptosis  Head: normocephalic; overall symmetric appearance without deformity  Face: overall symmetric without deformity  Ears: Normal appearance of external ear  Nose: No external deformity  Oral Cavity/oropharynx: Normal appearance of mucosa  Neck: no palpable lymphadenopathy; thyroid without palpable nodules  Lungs: symmetric chest rise; no wheezing  CV: Good distal perfusion; normal hear rate  Extremities: No deformity  Neurologic Exam: Cranial nerves II-XII are grossly intact      Procedure Note  Procedure performed: Rigid nasal endoscopy  Indication: To evaluate for sinonasal pathology not visualized on routine anterior rhinoscopy  Anesthesia: 4% topical lidocaine with 0.05 % oxymetazoline  Description of procedure: A 30 degree, 3 mm rigid endoscope was inserted into bilateral nasal cavities and the nasal valves, nasal cavity, middle meatus, sphenoethmoid recess, nasopharynx were evaluated for evidence " of obstruction, edema, purulence, polyps and/or mass/lesion.      Suzette-Valentin Endoscopic Scoring System  Endoscopic observation Right Left   Polyps in middle meatus (0 = absent, 1 = restricted to middle meatus, 2 = Beyond middle meatus) 0 0   Discharge (0 = absent, 1 = thin and clear, 2 = thick, purulent) 0 0   Edema (0 = absent, 1 = mild-moderate, 2 = moderate-severe) 1 1   Crusting (0 = absent, 1 = mild-moderate, 2 = moderate-severe) 0 0   Scarring (0= absent, 1 = mild-moderate, 2 = moderate-severe) 0 0   Total 1 1       Findings  RT/LT: Completely healed paranasal sinuses with significant improvement in underlying inflammation. No evidence of active infection.     The patient tolerated the procedure well without complication.     Assessment/Medical Decision Making:  Peace Boudreaux is a 59 year old female who presents for follow up. Her physical exam showed that she is completely healed from surgery and symptoms are much improved. Based on how she's feeling she can just do rinses right now. However, if symptoms begin, she should begin the budesonide rinses again. Overall, she is doing great and her symptoms are the best they've been in a year. We will follow up in one year or sooner if needed.      Plan:  Begin sinus rinse once daily.   Follow up with me in one year or as needed.   Refill budesonide rinses (use when symptonms present.).           Scribe Disclosure:   By signing my name below, I, Lianne Berry, attest that this documentation has been prepared under the direction and in the presence of Arnulfo Rodriguez MD.  - Electronically Signed: Lianne Berry 01/29/25     Provider Disclosure:  I agree with above History, Review of Systems, Physical exam and Plan.  I have reviewed the content of the documentation and have edited it as needed. I have personally performed the services documented here and the documentation accurately represents those services and the decisions I have made.      Electronically  signed by:    Arnulfo Rodriguez MD    [facility]  Rhinology, Endoscopic Skull Base Surgery  [facility]  Department of Otolaryngology - Head & Neck Surgery    ~~~~~~~~~~~~~~~~~~~~~~~~~~~~~~~~~~~~~~~~~~~~~~~~~~~~~~~~~~~~~~~~~~~~~~~~~~~~~~~~~~~~~~~~~~~~~~~~~~~~~~~~~~~~~~~~~~~~~~~~~~~~~~~~~~~~~~~    Past Medical History:   Diagnosis Date     ASCUS favor benign 8/2015    colp - neg     Congenital anomalies of other endocrine glands 2004    Cyst on thyroid     LSIL (low grade squamous intraepithelial lesion) on Pap smear 6/2014    neg high risk  HPV        Past Surgical History:   Procedure Laterality Date     OPERATIVE HYSTEROSCOPY WITH MORCELLATOR N/A 7/5/2018    Procedure: OPERATIVE HYSTEROSCOPY WITH MORCELLATOR (SUERO & NEPHEW);  OPERATIVE HYSTEROSCOPY AND POLYPECTOMY WITH SUERO NEPHEW MORCELLATOR AND FLUID MANAGEMENT ;  Surgeon: Gerard Cole MD;  Location: [facility]     OPTICAL TRACKING SYSTEM ENDOSCOPIC SINUS SURGERY Bilateral 6/10/2024    Procedure: Endoscopic bilateral maxillary antrostomy, total ethmoidectomy, sphenoidotomy, frontal sinusotomy, and endo septoplasty;  Surgeon: Arnulfo Rodriguez MD;  Location: [facility]     OPTICAL TRACKING SYSTEM ENDOSCOPIC SINUS SURGERY Bilateral 11/29/2024    Procedure: Stealth Assisted Bilateral revision maxillary antrostomy, ethmoidectomy, sphenoidotomy, frontal sinusotomy;  Surgeon: Arnulfo Rodriguez MD;  [place] [facility] [place] [place]     THYROID BIOPSY  2004     [facility] EXCISE HAND/FOOT NEUROMA  2002        Family History   Problem Relation Age of Onset     Atrial fibrillation Mother         pacemaker     Mitral valve prolapse Mother      Heart Disease Father         pacemaker     Cardiovascular Father         Pacemaker     C.A.D. Father      Anemia Brother      Heart Disease Maternal Grandmother         CHF     C.A.D. Maternal Grandmother      Heart Failure Maternal Grandmother      Gastrointestinal Disease Maternal Grandfather       Alcohol/Drug Maternal Grandfather         Social History     Socioeconomic History     Marital status: Single     Number of children: 0     Years of education: 16   Occupational History     Occupation:      Employer: [employer]   Tobacco Use     Smoking status: Never     Smokeless tobacco: Never   Substance and Sexual Activity     Alcohol use: Yes     Comment: occ     Drug use: No     Sexual activity: Not Currently     Partners: Male     Birth control/protection: Condom   Social History Narrative    Caffeine intake/servings daily - 0    Calcium intake/servings daily - 2-3    Exercise 4-5 times weekly cardio,wt, aerobics daily walks    Seatbelts used - Yes    Guns stored in the home - No    Self Breast Exam - Yes    Pap test up to date -  Yes    Eye exam up to date -  Yes    Dental exam up to date -  Yes    DEXA scan up to date -  Not Applicable    Flex Sig/Colonoscopy up to date -  Not Applicable    Mammography up to date - No    Immunizations reviewed and up to date - 2002 TD    Abuse: Current or Past (Physical, Sexual or Emotional) - No    Do you feel safe in your environment - Yes    Do you cope well with stress - Yes    Do you suffer from insomnia - No    Last updated by: SOFÍA Lynne RN  5/10/11     Social Drivers of Health     Financial Resource Strain: Low Risk  (4/2/2024)    Financial Resource Strain      Within the past 12 months, have you or your family members you live with been unable to get utilities (heat, electricity) when it was really needed?: No   Food Insecurity: Low Risk  (4/2/2024)    Food Insecurity      Within the past 12 months, did you worry that your food would run out before you got money to buy more?: No      Within the past 12 months, did the food you bought just not last and you didn t have money to get more?: No   Transportation Needs: Low Risk  (4/2/2024)    Transportation Needs      Within the past 12 months, has lack of transportation kept you from medical  appointments, getting your medicines, non-medical meetings or appointments, work, or from getting things that you need?: No   Interpersonal Safety: Low Risk  (11/29/2024)    Interpersonal Safety      Do you feel physically and emotionally safe where you currently live?: Yes      Within the past 12 months, have you been hit, slapped, kicked or otherwise physically hurt by someone?: No      Within the past 12 months, have you been humiliated or emotionally abused in other ways by your partner or ex-partner?: No   Housing Stability: Low Risk  (4/2/2024)    Housing Stability      Do you have housing? : Yes      Are you worried about losing your housing?: No          Again, thank you for allowing me to participate in the care of your patient.      Sincerely,    Arnulfo Rodriguez MD

## 2025-01-29 NOTE — TELEPHONE ENCOUNTER
Left Voicemail (1st Attempt) for the patient to call back and schedule the following:    Appointment type: Return ENT  Provider: Dr. Rodriguez  Return date: January 2026  Specialty phone number: [phone]  Additional appointment(s) needed: No  Additonal Notes: No

## 2025-01-29 NOTE — PATIENT INSTRUCTIONS
You were seen in the ENT Clinic today by Dr. Rodriguez. If you have any questions or concerns after your appointment, please contact us (see below)       2.   The following recommendations have been made based upon your appointment today:   -Continue sinus rinses once daily. Add budesonide for symptoms. We have sent a new prescription to your pharmacy for this. You can fill this at your convenience.      3.   Plan to return to the ENT clinic in 1 year.           How to Contact Us:  Send a [facility] message to your provider. Our team will respond to you via [facility] [facility]. Occasionally, we will need to call you to get further information.  For urgent matters (Monday-Friday), call the ENT Clinic: [phone] and speak with a call center team member - they will route your call appropriately.   If you'd like to speak directly with a nurse, please find our contact information below. We do our best to check voicemail frequently throughout the day, and will work to call you back within 1-2 days. For urgent matters, please use the general clinic phone numbers listed above.     Debra OCONNOR RN  Direct: [phone]  Alice GREEN LPN  Direct: [phone]         [facility]  Department of Otolaryngology

## 2025-01-29 NOTE — NURSING NOTE
"Chief Complaint   Patient presents with    RECHECK   Blood pressure (!) 124/91, pulse 87, height 1.651 m (5' 5\"), last menstrual period 05/08/2012, SpO2 95%, not currently breastfeeding. Drake Aly, EMT    "

## (undated) DEVICE — SPECIMEN TRAP STRYKER NEPTUNE IN-LINE 0700-050-000

## (undated) DEVICE — DRAPE POUCH INSTRUMENT 1018

## (undated) DEVICE — ENDO SHEATH STORZ SHARPSITE ENDOSCRUB 0DEG 4MM 1912000

## (undated) DEVICE — SPONGE COTTONOID 2X1/2" 80-1406

## (undated) DEVICE — SU CHROMIC 4-0 P-3 18" 1654G

## (undated) DEVICE — NDL 25GA 2"  8881200441

## (undated) DEVICE — PACK ENT ENDOSCOPY CUSTOM ASC

## (undated) DEVICE — BLADE SHAVER SINUS 3.5MM RAD 40 ROTATE 1883506HRE

## (undated) DEVICE — SUCTION CANISTER BEMIS HI FLOW 006772-901

## (undated) DEVICE — STRAP KNEE/BODY 31143004

## (undated) DEVICE — TUBING SUCTION 10'X3/16" N510

## (undated) DEVICE — ESU GROUND PAD ADULT W/CORD E7507

## (undated) DEVICE — Device

## (undated) DEVICE — SOL NACL 0.9% IRRIG 3000ML BAG 2B7477

## (undated) DEVICE — PACK TVT HYSTEROSCOPY SMA15HYFSE

## (undated) DEVICE — SOL NACL 0.9% IRRIG 500ML BOTTLE 2F7123

## (undated) DEVICE — TRACKER PATIENT NON-INVASIVE AXIEM 9734887XOM

## (undated) DEVICE — GLOVE BIOGEL PI MICRO SZ 7.5 48575

## (undated) DEVICE — SYR 30ML LL W/O NDL 302832

## (undated) DEVICE — APPLICATOR COTTON TIP 6"X2 STERILE LF 6012

## (undated) DEVICE — BLADE MORCELLATOR TRUCLEAR INSICOR PLUS 2.9 72202536

## (undated) DEVICE — SUCTION MANIFOLD NEPTUNE 2 SYS 1 PORT 702-025-000

## (undated) DEVICE — ENDO SHEATH STORZ SHARPSITE ENDOSCRUB 30DEG 4MM 1912010

## (undated) DEVICE — BUR TAPER DIAMOND 4MM 70DEG 1883672HS

## (undated) DEVICE — TRACKER ENT OTS INSTRUMENT FUSION 9733533

## (undated) DEVICE — LINEN TOWEL PACK X5 5464

## (undated) DEVICE — BLADE SHAVER SINUS 4MM RAD 12DEG CVD 1884012HR

## (undated) DEVICE — SPLINT INTRANASAL POSISEPX GEL SPONGE 9210584

## (undated) DEVICE — BLADE SHAVER SINUS 4MM RAD 60DEG CVD 1884016HR

## (undated) DEVICE — SYR 03ML LL W/O NDL 309657

## (undated) DEVICE — TUBING IRRIGATOR STRAIGHTSHOT XPS 1895522

## (undated) DEVICE — SOL NACL 0.9% INJ 1000ML BAG 2B1324X

## (undated) DEVICE — DRAPE WARMER 66X44" ORS-300

## (undated) DEVICE — ESU SUCTION CAUTERY 10FR FOOT CONTROL E2505-10FR

## (undated) DEVICE — DRSG HOLDER NASAL DALE 600

## (undated) DEVICE — SUCTION CANISTER MEDIVAC LINER 3000ML W/LID 65651-530

## (undated) DEVICE — STPL SKIN 35W ROTATING HEAD PRW35

## (undated) DEVICE — SOL WATER IRRIG 1000ML BOTTLE 2F7114

## (undated) DEVICE — GLOVE PROTEXIS W/NEU-THERA 7.0  2D73TE70

## (undated) DEVICE — EYE STRIP FLUORESCEIN TEST 1MG BIO-GLO HUO900-11

## (undated) DEVICE — SUCTION MANIFOLD NEPTUNE 2 SYS 4 PORT 0702-020-000

## (undated) DEVICE — KIT HYSTEROSCOPIC 7209827

## (undated) DEVICE — SOL NACL 0.9% IRRIG 1000ML BOTTLE 07138-09

## (undated) RX ORDER — TRIAMCINOLONE ACETONIDE 40 MG/ML
INJECTION, SUSPENSION INTRA-ARTICULAR; INTRAMUSCULAR
Status: DISPENSED
Start: 2024-06-10

## (undated) RX ORDER — OXYCODONE HYDROCHLORIDE 5 MG/1
TABLET ORAL
Status: DISPENSED
Start: 2024-06-10

## (undated) RX ORDER — ONDANSETRON 2 MG/ML
INJECTION INTRAMUSCULAR; INTRAVENOUS
Status: DISPENSED
Start: 2018-07-05

## (undated) RX ORDER — DEXMEDETOMIDINE HYDROCHLORIDE 4 UG/ML
INJECTION, SOLUTION INTRAVENOUS
Status: DISPENSED
Start: 2024-11-29

## (undated) RX ORDER — ACETAMINOPHEN 325 MG/1
TABLET ORAL
Status: DISPENSED
Start: 2024-11-29

## (undated) RX ORDER — PROPOFOL 10 MG/ML
INJECTION, EMULSION INTRAVENOUS
Status: DISPENSED
Start: 2024-11-29

## (undated) RX ORDER — NEOSTIGMINE METHYLSULFATE 1 MG/ML
VIAL (ML) INJECTION
Status: DISPENSED
Start: 2018-07-05

## (undated) RX ORDER — LIDOCAINE HYDROCHLORIDE AND EPINEPHRINE 10; 10 MG/ML; UG/ML
INJECTION, SOLUTION INFILTRATION; PERINEURAL
Status: DISPENSED
Start: 2024-11-29

## (undated) RX ORDER — ONDANSETRON 2 MG/ML
INJECTION INTRAMUSCULAR; INTRAVENOUS
Status: DISPENSED
Start: 2024-11-29

## (undated) RX ORDER — OXYMETAZOLINE HYDROCHLORIDE 0.05 G/100ML
SPRAY NASAL
Status: DISPENSED
Start: 2024-11-29

## (undated) RX ORDER — DEXMEDETOMIDINE HYDROCHLORIDE 4 UG/ML
INJECTION, SOLUTION INTRAVENOUS
Status: DISPENSED
Start: 2024-06-10

## (undated) RX ORDER — KETOROLAC TROMETHAMINE 30 MG/ML
INJECTION, SOLUTION INTRAMUSCULAR; INTRAVENOUS
Status: DISPENSED
Start: 2018-07-05

## (undated) RX ORDER — FENTANYL CITRATE 50 UG/ML
INJECTION, SOLUTION INTRAMUSCULAR; INTRAVENOUS
Status: DISPENSED
Start: 2024-06-10

## (undated) RX ORDER — PROPOFOL 10 MG/ML
INJECTION, EMULSION INTRAVENOUS
Status: DISPENSED
Start: 2024-06-10

## (undated) RX ORDER — FENTANYL CITRATE 50 UG/ML
INJECTION, SOLUTION INTRAMUSCULAR; INTRAVENOUS
Status: DISPENSED
Start: 2024-11-29

## (undated) RX ORDER — FENTANYL CITRATE 50 UG/ML
INJECTION, SOLUTION INTRAMUSCULAR; INTRAVENOUS
Status: DISPENSED
Start: 2018-07-05

## (undated) RX ORDER — PROPOFOL 10 MG/ML
INJECTION, EMULSION INTRAVENOUS
Status: DISPENSED
Start: 2018-07-05

## (undated) RX ORDER — TRIAMCINOLONE ACETONIDE 40 MG/ML
INJECTION, SUSPENSION INTRA-ARTICULAR; INTRAMUSCULAR
Status: DISPENSED
Start: 2024-11-29

## (undated) RX ORDER — ACETAMINOPHEN 325 MG/1
TABLET ORAL
Status: DISPENSED
Start: 2024-06-10

## (undated) RX ORDER — DEXAMETHASONE SODIUM PHOSPHATE 4 MG/ML
INJECTION, SOLUTION INTRA-ARTICULAR; INTRALESIONAL; INTRAMUSCULAR; INTRAVENOUS; SOFT TISSUE
Status: DISPENSED
Start: 2018-07-05

## (undated) RX ORDER — HYDROMORPHONE HYDROCHLORIDE 1 MG/ML
INJECTION, SOLUTION INTRAMUSCULAR; INTRAVENOUS; SUBCUTANEOUS
Status: DISPENSED
Start: 2024-11-29

## (undated) RX ORDER — OXYCODONE HYDROCHLORIDE 5 MG/1
TABLET ORAL
Status: DISPENSED
Start: 2018-07-05

## (undated) RX ORDER — CEFAZOLIN SODIUM 2 G/50ML
SOLUTION INTRAVENOUS
Status: DISPENSED
Start: 2024-06-10

## (undated) RX ORDER — DEXAMETHASONE SODIUM PHOSPHATE 10 MG/ML
INJECTION, SOLUTION INTRAMUSCULAR; INTRAVENOUS
Status: DISPENSED
Start: 2024-11-29

## (undated) RX ORDER — HYDROMORPHONE HYDROCHLORIDE 1 MG/ML
INJECTION, SOLUTION INTRAMUSCULAR; INTRAVENOUS; SUBCUTANEOUS
Status: DISPENSED
Start: 2024-06-10

## (undated) RX ORDER — OXYCODONE HYDROCHLORIDE 5 MG/1
TABLET ORAL
Status: DISPENSED
Start: 2024-11-29

## (undated) RX ORDER — ONDANSETRON 2 MG/ML
INJECTION INTRAMUSCULAR; INTRAVENOUS
Status: DISPENSED
Start: 2024-06-10

## (undated) RX ORDER — FENTANYL CITRATE-0.9 % NACL/PF 10 MCG/ML
PLASTIC BAG, INJECTION (ML) INTRAVENOUS
Status: DISPENSED
Start: 2024-06-10

## (undated) RX ORDER — EPINEPHRINE NASAL SOLUTION 1 MG/ML
SOLUTION NASAL
Status: DISPENSED
Start: 2024-11-29

## (undated) RX ORDER — LIDOCAINE HYDROCHLORIDE 20 MG/ML
INJECTION, SOLUTION EPIDURAL; INFILTRATION; INTRACAUDAL; PERINEURAL
Status: DISPENSED
Start: 2018-07-05

## (undated) RX ORDER — DEXAMETHASONE SODIUM PHOSPHATE 10 MG/ML
INJECTION, SOLUTION INTRAMUSCULAR; INTRAVENOUS
Status: DISPENSED
Start: 2024-06-10

## (undated) RX ORDER — CEFAZOLIN SODIUM 2 G/50ML
SOLUTION INTRAVENOUS
Status: DISPENSED
Start: 2024-11-29